# Patient Record
Sex: MALE | Race: WHITE | Employment: OTHER | URBAN - METROPOLITAN AREA
[De-identification: names, ages, dates, MRNs, and addresses within clinical notes are randomized per-mention and may not be internally consistent; named-entity substitution may affect disease eponyms.]

---

## 2019-11-19 ENCOUNTER — OFFICE VISIT (OUTPATIENT)
Dept: PODIATRY | Age: 79
End: 2019-11-19
Payer: COMMERCIAL

## 2019-11-19 VITALS
WEIGHT: 178 LBS | TEMPERATURE: 97.8 F | SYSTOLIC BLOOD PRESSURE: 136 MMHG | BODY MASS INDEX: 26.29 KG/M2 | DIASTOLIC BLOOD PRESSURE: 78 MMHG

## 2019-11-19 DIAGNOSIS — I73.9 PERIPHERAL VASCULAR DISEASE, UNSPECIFIED (HCC): ICD-10-CM

## 2019-11-19 DIAGNOSIS — R26.2 DIFFICULTY WALKING: ICD-10-CM

## 2019-11-19 DIAGNOSIS — M79.674 PAIN IN TOE OF RIGHT FOOT: ICD-10-CM

## 2019-11-19 DIAGNOSIS — B35.1 TINEA UNGUIUM: Primary | ICD-10-CM

## 2019-11-19 DIAGNOSIS — L84 CORN OR CALLUS: ICD-10-CM

## 2019-11-19 DIAGNOSIS — M20.42 HAMMER TOES OF BOTH FEET: ICD-10-CM

## 2019-11-19 DIAGNOSIS — M20.41 HAMMER TOES OF BOTH FEET: ICD-10-CM

## 2019-11-19 DIAGNOSIS — M79.675 PAIN IN LEFT TOE(S): ICD-10-CM

## 2019-11-19 PROCEDURE — 99203 OFFICE O/P NEW LOW 30 MIN: CPT | Performed by: PODIATRIST

## 2019-12-12 ENCOUNTER — HOSPITAL ENCOUNTER (EMERGENCY)
Age: 79
Discharge: HOME | End: 2019-12-13
Attending: EMERGENCY MEDICINE
Payer: COMMERCIAL

## 2019-12-12 ENCOUNTER — APPOINTMENT (OUTPATIENT)
Dept: GENERAL RADIOLOGY | Age: 79
End: 2019-12-12
Payer: COMMERCIAL

## 2019-12-12 DIAGNOSIS — J11.1 INFLUENZA-LIKE ILLNESS: Primary | ICD-10-CM

## 2019-12-12 PROCEDURE — 87502 INFLUENZA DNA AMP PROBE: CPT

## 2019-12-12 PROCEDURE — 81001 URINALYSIS AUTO W/SCOPE: CPT

## 2019-12-12 PROCEDURE — 71045 X-RAY EXAM CHEST 1 VIEW: CPT

## 2019-12-12 PROCEDURE — 99284 EMERGENCY DEPT VISIT MOD MDM: CPT

## 2019-12-12 RX ORDER — ACETAMINOPHEN 500 MG
1000 TABLET ORAL ONCE
Status: COMPLETED | OUTPATIENT
Start: 2019-12-13 | End: 2019-12-13

## 2019-12-12 RX ORDER — KETOROLAC TROMETHAMINE 30 MG/ML
30 INJECTION, SOLUTION INTRAMUSCULAR; INTRAVENOUS ONCE
Status: COMPLETED | OUTPATIENT
Start: 2019-12-13 | End: 2019-12-13

## 2019-12-12 ASSESSMENT — PAIN DESCRIPTION - DESCRIPTORS: DESCRIPTORS: ACHING

## 2019-12-12 ASSESSMENT — PAIN SCALES - GENERAL: PAINLEVEL_OUTOF10: 3

## 2019-12-12 ASSESSMENT — PAIN DESCRIPTION - FREQUENCY: FREQUENCY: CONTINUOUS

## 2019-12-12 ASSESSMENT — PAIN DESCRIPTION - LOCATION: LOCATION: GENERALIZED

## 2019-12-12 ASSESSMENT — PAIN DESCRIPTION - PAIN TYPE: TYPE: ACUTE PAIN

## 2019-12-13 VITALS
WEIGHT: 167 LBS | HEART RATE: 103 BPM | HEIGHT: 70 IN | SYSTOLIC BLOOD PRESSURE: 179 MMHG | BODY MASS INDEX: 23.91 KG/M2 | OXYGEN SATURATION: 94 % | DIASTOLIC BLOOD PRESSURE: 88 MMHG | RESPIRATION RATE: 16 BRPM | TEMPERATURE: 100.1 F

## 2019-12-13 LAB
BACTERIA: ABNORMAL /HPF
BILIRUBIN URINE: NEGATIVE
BLOOD, URINE: ABNORMAL
CLARITY: CLEAR
COLOR: YELLOW
EPITHELIAL CELLS, UA: ABNORMAL /HPF
GLUCOSE URINE: NEGATIVE MG/DL
INFLUENZA A BY PCR: NOT DETECTED
INFLUENZA B BY PCR: NOT DETECTED
KETONES, URINE: NEGATIVE MG/DL
LEUKOCYTE ESTERASE, URINE: NEGATIVE
NITRITE, URINE: POSITIVE
PH UA: 6.5 (ref 5–9)
PROTEIN UA: 30 MG/DL
RBC UA: ABNORMAL /HPF (ref 0–2)
SPECIFIC GRAVITY UA: 1.02 (ref 1–1.03)
UROBILINOGEN, URINE: 0.2 E.U./DL
WBC UA: ABNORMAL /HPF (ref 0–5)

## 2019-12-13 PROCEDURE — 6360000002 HC RX W HCPCS: Performed by: EMERGENCY MEDICINE

## 2019-12-13 PROCEDURE — 87040 BLOOD CULTURE FOR BACTERIA: CPT

## 2019-12-13 PROCEDURE — 36415 COLL VENOUS BLD VENIPUNCTURE: CPT

## 2019-12-13 PROCEDURE — 96372 THER/PROPH/DIAG INJ SC/IM: CPT

## 2019-12-13 PROCEDURE — 6370000000 HC RX 637 (ALT 250 FOR IP): Performed by: EMERGENCY MEDICINE

## 2019-12-13 RX ADMIN — KETOROLAC TROMETHAMINE 30 MG: 30 INJECTION, SOLUTION INTRAMUSCULAR; INTRAVENOUS at 00:09

## 2019-12-13 RX ADMIN — ACETAMINOPHEN 1000 MG: 500 TABLET ORAL at 00:09

## 2019-12-13 ASSESSMENT — PAIN SCALES - GENERAL: PAINLEVEL_OUTOF10: 6

## 2019-12-18 LAB
BLOOD CULTURE, ROUTINE: NORMAL
CULTURE, BLOOD 2: NORMAL

## 2022-02-07 ENCOUNTER — TELEPHONE (OUTPATIENT)
Dept: ADMINISTRATIVE | Age: 82
End: 2022-02-07

## 2022-02-07 NOTE — TELEPHONE ENCOUNTER
Patient would like to reschedule his appt he missed on 02/03 from the weather, He stated this is for Bilateral heel spurs. Skin getting real hard. Please contact patient.

## 2022-02-08 ENCOUNTER — PROCEDURE VISIT (OUTPATIENT)
Dept: PODIATRY | Age: 82
End: 2022-02-08
Payer: COMMERCIAL

## 2022-02-08 VITALS
TEMPERATURE: 98.2 F | BODY MASS INDEX: 25.97 KG/M2 | DIASTOLIC BLOOD PRESSURE: 62 MMHG | WEIGHT: 181 LBS | SYSTOLIC BLOOD PRESSURE: 133 MMHG

## 2022-02-08 DIAGNOSIS — L84 CORN OR CALLUS: ICD-10-CM

## 2022-02-08 DIAGNOSIS — B35.1 TINEA UNGUIUM: Primary | ICD-10-CM

## 2022-02-08 DIAGNOSIS — M79.675 PAIN IN LEFT TOE(S): ICD-10-CM

## 2022-02-08 DIAGNOSIS — M79.674 PAIN IN TOE OF RIGHT FOOT: ICD-10-CM

## 2022-02-08 DIAGNOSIS — I73.9 PERIPHERAL VASCULAR DISEASE, UNSPECIFIED (HCC): ICD-10-CM

## 2022-02-08 PROCEDURE — 11721 DEBRIDE NAIL 6 OR MORE: CPT | Performed by: PODIATRIST

## 2022-02-08 RX ORDER — AMMONIUM LACTATE 12 G/100G
CREAM TOPICAL
Qty: 385 G | Refills: 2 | Status: SHIPPED | OUTPATIENT
Start: 2022-02-08

## 2022-02-08 NOTE — PROGRESS NOTES
[x] [x] [x] [x] [x] [x] [x] [x] [x] [x]  5 4 3  2 1 1 2 3 4 5                         Right                                        Left      Nails that are described above are all elongated thickened pitting mycotic yellowish incurvated causing pain with both shoe gear. Palpation nails greater then 3 mm thick painful       Dermatologic Exam:hair loss noted  lower extremity    Skin lesion/ulceration   Skin   Callus BILATERAL  FOREFOOT   Musculoskeletal:     1st MPJ ROM normal, Bilateral.  Muscle strength 5/5, Bilateral.  Pain present upon palpation of toenails 1-5  Bilateral., Bilateral.  Ankle ROM normal,Bilateral.    Dorsally contracted digits , Bilateral.     Vascular:  Pulses   bilateral DP PRESENT     PT ABSENT     Neurological:  Sensation present to light touch to level of digits, Bilateral.    Foot Exam     Ortho Exam  Q7   []Yes    []No                Q8   [x]Yes    []No                     Q9   []Yes    []No  Assessment:  80 y.o. male with:   1. Tinea unguium    2. Pain in left toe(s)    3. Pain in toe of right foot    4. Peripheral vascular disease, unspecified ([address]    5. Corn or callus     No orders of the defined types were placed in this encounter. Plan:   Pt was evaluated and examined. Patient was given personalized discharge instructions. Nails 1-10 were debrided in length and thickness sharply with a nail nipper and  without incident. Pt will follow up in 9 weeks or sooner if any problems arise. Diagnosis was discussed with the pt and all of their questions were answered in detail. Proper foot hygiene and care was discussed with the pt. Patient to check feet daily and contact the office with any questions/problems/concerns. Other comorbidity noted and will be managed by PCP. Pain waiver discussed with patient and confirmed.    2/8/2022      Electronically signed by Giulia Leslie DPM on 2/8/2022 at 2:36 PM  2/8/2022

## 2022-03-14 ENCOUNTER — HOSPITAL ENCOUNTER (OUTPATIENT)
Age: 82
Discharge: HOME | End: 2022-03-16
Payer: COMMERCIAL

## 2022-03-14 ENCOUNTER — HOSPITAL ENCOUNTER (OUTPATIENT)
Dept: GENERAL RADIOLOGY | Age: 82
Discharge: HOME | End: 2022-03-16
Payer: COMMERCIAL

## 2022-03-14 DIAGNOSIS — M79.632 LEFT FOREARM PAIN: ICD-10-CM

## 2022-03-14 PROCEDURE — 73090 X-RAY EXAM OF FOREARM: CPT

## 2022-04-12 ENCOUNTER — PROCEDURE VISIT (OUTPATIENT)
Dept: PODIATRY | Age: 82
End: 2022-04-12
Payer: COMMERCIAL

## 2022-04-12 VITALS
BODY MASS INDEX: 25.97 KG/M2 | SYSTOLIC BLOOD PRESSURE: 142 MMHG | DIASTOLIC BLOOD PRESSURE: 64 MMHG | TEMPERATURE: 98.2 F | WEIGHT: 181 LBS

## 2022-04-12 DIAGNOSIS — I73.9 PERIPHERAL VASCULAR DISEASE, UNSPECIFIED (HCC): ICD-10-CM

## 2022-04-12 DIAGNOSIS — M79.675 PAIN IN LEFT TOE(S): ICD-10-CM

## 2022-04-12 DIAGNOSIS — B35.1 TINEA UNGUIUM: Primary | ICD-10-CM

## 2022-04-12 DIAGNOSIS — M79.674 PAIN IN TOE OF RIGHT FOOT: ICD-10-CM

## 2022-04-12 PROCEDURE — 11721 DEBRIDE NAIL 6 OR MORE: CPT | Performed by: PODIATRIST

## 2022-04-12 NOTE — PROGRESS NOTES
Missy Landers  Return Patient    Chief Complaint   Patient presents with    Toe Pain     saw pcp Dr. Francis Nance on 2/14/22       Subjective: This Picherlilian Friend comes to office for foot and nail care. Pt currently has complaint of thickened, painful, elongated nails that he/she cannot manage by themselves. Pt. Relates pain to nails with shoe gear. Pt's primary care physician is Olam Gottron, MD.  Past Medical History:   Diagnosis Date    Hyperlipidemia     Hypertension        No Known Allergies  Current Outpatient Medications on File Prior to Visit   Medication Sig Dispense Refill    ammonium lactate (LAC-HYDRIN) 12 % cream Apply topically as needed. 385 g 2    aspirin 81 MG tablet Take 81 mg by mouth daily      Atorvastatin Calcium (LIPITOR PO) Take 20 mg by mouth       lisinopril (PRINIVIL;ZESTRIL) 20 MG tablet Take 20 mg by mouth daily       No current facility-administered medications on file prior to visit. Review of Systems  Objective:  General: AAO x 3 in NAD.     Derm  Toenail Description  Sites of Onychomycosis Involvement (Check affected area)  [x] [x] [x] [x] [x] [x] [x] [x] [x] [x]  5 4 3 2 1 1 2 3 4 5                          Right                                        Left    Thickness  [x] [x] [x] [x] [x] [x] [x] [x] [x] [x]  5 4 3 2 1 1 2 3 4 5                         Right                                        Left    Dystrophic Changes   [x] [x] [x] [x] [x] [x] [x] [x] [x] [x]  5 4 3 2 1 1 2 3 4 5                         Right                                        Left    Color  [x] [x] [x] [x] [x] [x] [x] [x] [x] [x]  5 4 3 2 1 1 2 3 4 5                          Right                                        Left    Incurvation/Ingrowin   [x] [x] [x] [x] [x] [x] [x] [x] [x] [x]  5 4 3 2 1 1 2 3 4 5                         Right                                        Left    Inflammation/Pain   [x] [x] [x] [x] [x] [x] [x] [x] [x] [x]  5 4 3  2 1 1 2 3 4 5 Right                                        Left      Nails that are described above are all elongated thickened pitting mycotic yellowish incurvated causing pain with both shoe gear. Palpation nails greater then 3 mm thick painful       Dermatologic Exam:hair loss noted  lower extremity    Skin lesion/ulceration   Skin   Callus BILATERAL  FOREFOOT   Musculoskeletal:     1st MPJ ROM normal, Bilateral.  Muscle strength 5/5, Bilateral.  Pain present upon palpation of toenails 1-5  Bilateral., Bilateral.  Ankle ROM normal,Bilateral.    Dorsally contracted digits , Bilateral.     Vascular:  Pulses   bilateral DP PRESENT     PT ABSENT     Neurological:  Sensation present to light touch to level of digits, Bilateral.    Foot Exam     Ortho Exam  Q7   []Yes    []No                Q8   [x]Yes    []No                     Q9   []Yes    []No  Assessment:  80 y.o. male with:   1. Tinea unguium    2. Pain in left toe(s)    3. Pain in toe of right foot    4. Peripheral vascular disease, unspecified ([facility] [address]     No orders of the defined types were placed in this encounter. Plan:   Pt was evaluated and examined. Patient was given personalized discharge instructions. Nails 1-10 were debrided in length and thickness sharply with a nail nipper and  without incident. Pt will follow up in 9 weeks or sooner if any problems arise. Diagnosis was discussed with the pt and all of their questions were answered in detail. Proper foot hygiene and care was discussed with the pt. Patient to check feet daily and contact the office with any questions/problems/concerns. Other comorbidity noted and will be managed by PCP. Pain waiver discussed with patient and confirmed.    4/12/2022      Electronically signed by Akbar Panchal DPM on 4/12/2022 at 2:24 PM  4/12/2022

## 2022-09-28 LAB — VITAMIN B-12: 754

## 2023-01-04 ENCOUNTER — ANESTHESIA EVENT (OUTPATIENT)
Dept: CT IMAGING | Age: 83
End: 2023-01-04

## 2023-01-04 ENCOUNTER — ANESTHESIA (OUTPATIENT)
Dept: CT IMAGING | Age: 83
End: 2023-01-04

## 2023-01-04 ENCOUNTER — HOSPITAL ENCOUNTER (OUTPATIENT)
Dept: CT IMAGING | Age: 83
Discharge: HOME | End: 2023-01-06
Payer: COMMERCIAL

## 2023-01-04 VITALS
TEMPERATURE: 98.6 F | HEART RATE: 77 BPM | OXYGEN SATURATION: 98 % | SYSTOLIC BLOOD PRESSURE: 159 MMHG | RESPIRATION RATE: 17 BRPM | DIASTOLIC BLOOD PRESSURE: 69 MMHG

## 2023-01-04 DIAGNOSIS — D69.6 THROMBOCYTOPENIA, UNSPECIFIED (HCC): ICD-10-CM

## 2023-01-04 LAB
ANION GAP SERPL CALCULATED.3IONS-SCNC: 12 MMOL/L (ref 7–16)
ANISOCYTOSIS: ABNORMAL
BASOPHILS ABSOLUTE: 0.04 E9/L (ref 0–0.2)
BASOPHILS RELATIVE PERCENT: 0.9 % (ref 0–2)
BUN BLDV-MCNC: 31 MG/DL (ref 6–23)
CALCIUM SERPL-MCNC: 9.7 MG/DL (ref 8.6–10.2)
CHLORIDE BLD-SCNC: 106 MMOL/L (ref 98–107)
CO2: 23 MMOL/L (ref 22–29)
CREAT SERPL-MCNC: 1.2 MG/DL (ref 0.7–1.2)
EOSINOPHILS ABSOLUTE: 0 E9/L (ref 0.05–0.5)
EOSINOPHILS RELATIVE PERCENT: 0.4 % (ref 0–6)
GFR SERPL CREATININE-BSD FRML MDRD: >60 ML/MIN/1.73
GLUCOSE BLD-MCNC: 82 MG/DL (ref 74–99)
HCT VFR BLD CALC: 26 % (ref 37–54)
HEMOGLOBIN: 8.3 G/DL (ref 12.5–16.5)
INR BLD: 1.2
LYMPHOCYTES ABSOLUTE: 1.78 E9/L (ref 1.5–4)
LYMPHOCYTES RELATIVE PERCENT: 36.5 % (ref 20–42)
MCH RBC QN AUTO: 28.5 PG (ref 26–35)
MCHC RBC AUTO-ENTMCNC: 31.9 % (ref 32–34.5)
MCV RBC AUTO: 89.3 FL (ref 80–99.9)
METAMYELOCYTES RELATIVE PERCENT: 1.7 % (ref 0–1)
MONOCYTES ABSOLUTE: 0.19 E9/L (ref 0.1–0.95)
MONOCYTES RELATIVE PERCENT: 3.5 % (ref 2–12)
MYELOCYTE PERCENT: 1.7 % (ref 0–0)
NEUTROPHILS ABSOLUTE: 2.83 E9/L (ref 1.8–7.3)
NEUTROPHILS RELATIVE PERCENT: 55.7 % (ref 43–80)
NUCLEATED RED BLOOD CELLS: 5.2 /100 WBC
OVALOCYTES: ABNORMAL
PDW BLD-RTO: 17 FL (ref 11.5–15)
PLATELET # BLD: 57 E9/L (ref 130–450)
PLATELET CONFIRMATION: NORMAL
PMV BLD AUTO: 11.2 FL (ref 7–12)
POIKILOCYTES: ABNORMAL
POLYCHROMASIA: ABNORMAL
POTASSIUM SERPL-SCNC: 4.8 MMOL/L (ref 3.5–5)
PROTHROMBIN TIME: 13.6 SEC (ref 9.3–12.4)
RBC # BLD: 2.91 E12/L (ref 3.8–5.8)
SODIUM BLD-SCNC: 141 MMOL/L (ref 132–146)
TEAR DROP CELLS: ABNORMAL
WBC # BLD: 4.8 E9/L (ref 4.5–11.5)

## 2023-01-04 PROCEDURE — 77012 CT SCAN FOR NEEDLE BIOPSY: CPT

## 2023-01-04 PROCEDURE — 2580000003 HC RX 258: Performed by: NURSE ANESTHETIST, CERTIFIED REGISTERED

## 2023-01-04 PROCEDURE — 80048 BASIC METABOLIC PNL TOTAL CA: CPT

## 2023-01-04 PROCEDURE — 3700000001 HC ADD 15 MINUTES (ANESTHESIA)

## 2023-01-04 PROCEDURE — 36415 COLL VENOUS BLD VENIPUNCTURE: CPT

## 2023-01-04 PROCEDURE — 7100000010 HC PHASE II RECOVERY - FIRST 15 MIN

## 2023-01-04 PROCEDURE — 7100000011 HC PHASE II RECOVERY - ADDTL 15 MIN

## 2023-01-04 PROCEDURE — 3700000000 HC ANESTHESIA ATTENDED CARE

## 2023-01-04 PROCEDURE — 6360000002 HC RX W HCPCS: Performed by: NURSE ANESTHETIST, CERTIFIED REGISTERED

## 2023-01-04 PROCEDURE — 38222 DX BONE MARROW BX & ASPIR: CPT

## 2023-01-04 PROCEDURE — 85025 COMPLETE CBC W/AUTO DIFF WBC: CPT

## 2023-01-04 PROCEDURE — 85610 PROTHROMBIN TIME: CPT

## 2023-01-04 RX ORDER — LISINOPRIL 10 MG/1
10 TABLET ORAL DAILY
COMMUNITY
Start: 2022-12-11

## 2023-01-04 RX ORDER — PROPOFOL 10 MG/ML
INJECTION, EMULSION INTRAVENOUS PRN
Status: DISCONTINUED | OUTPATIENT
Start: 2023-01-04 | End: 2023-01-04 | Stop reason: SDUPTHER

## 2023-01-04 RX ORDER — UBIDECARENONE 200 MG
200 CAPSULE ORAL DAILY
COMMUNITY

## 2023-01-04 RX ORDER — MULTIVITAMIN WITH IRON
250 TABLET ORAL DAILY
COMMUNITY

## 2023-01-04 RX ORDER — ATORVASTATIN CALCIUM 20 MG/1
20 TABLET, FILM COATED ORAL DAILY
COMMUNITY
Start: 2022-12-11

## 2023-01-04 RX ORDER — ICOSAPENT ETHYL 1000 MG/1
2 CAPSULE ORAL
COMMUNITY
Start: 2022-12-13

## 2023-01-04 RX ORDER — SODIUM CHLORIDE 9 MG/ML
INJECTION, SOLUTION INTRAVENOUS CONTINUOUS PRN
Status: DISCONTINUED | OUTPATIENT
Start: 2023-01-04 | End: 2023-01-04 | Stop reason: SDUPTHER

## 2023-01-04 RX ADMIN — PROPOFOL 100 MG: 10 INJECTION, EMULSION INTRAVENOUS at 12:44

## 2023-01-04 RX ADMIN — PROPOFOL 30 MG: 10 INJECTION, EMULSION INTRAVENOUS at 12:53

## 2023-01-04 RX ADMIN — PROPOFOL 75 MCG/KG/MIN: 10 INJECTION, EMULSION INTRAVENOUS at 12:45

## 2023-01-04 RX ADMIN — SODIUM CHLORIDE: 9 INJECTION, SOLUTION INTRAVENOUS at 12:32

## 2023-01-04 ASSESSMENT — LIFESTYLE VARIABLES: SMOKING_STATUS: 1

## 2023-01-04 NOTE — PROCEDURES
Patient arrived Radiology department for bone marrow biopsy and aspiration. Allergies, home medications, H&P and fasting instructions reviewed with patient. Vital signs taken. R FA 20g IV placed, blood obtained, IV flushed and prn adapter attached. Blood sample sent to lab for ordered tests. Procedural instructions given, questions answered, understanding expressed and consent signed. Patient given fluoroscopy education, no questions at this time.

## 2023-01-04 NOTE — PROCEDURES
1330 Patient returned from procedure. Dressing checked, clean, dry, and intact. Patient stable. No s/s of complications noted or reported. Vitals will be checked q 15min, see flow sheets. 1345 Dressing checked, clean, dry, and intact. Patient stable. No s/s of complications noted or reported. Patient eating and drinking well with no s/s of complications noted or reported. Patient discharged, site was checked with every set of vitals. Site clean dry and intact. Discharge papers reviewed with patient, questions answered, discharge paper signed. Patient taken to door via ***. Patient in stable condition, no s/s of complications noted or reported.

## 2023-01-04 NOTE — ANESTHESIA PRE PROCEDURE
Department of Anesthesiology  Preprocedure Note       Name:  Laura Landeros   Age:  [de-identified] y.o.  :  1940                                          MRN:  [id number]         Date:  2023      Surgeon: * No surgeons listed *    Procedure: * No procedures listed *    Medications prior to admission:   Prior to Admission medications    Medication Sig Start Date End Date Taking? Authorizing Provider   atorvastatin (LIPITOR) 20 MG tablet Take 20 mg by mouth daily 22   Historical Provider, MD   lisinopril (PRINIVIL;ZESTRIL) 10 MG tablet Take 10 mg by mouth daily 22   Historical Provider, MD   Icosapent Ethyl (VASCEPA) 1 g CAPS capsule Take 2 capsules by mouth 22   Historical Provider, MD   Cholecalciferol 50 MCG ( UT) CAPS Take 2,000 Units by mouth daily    Historical Provider, MD   magnesium (MAGNESIUM-OXIDE) 250 MG TABS tablet Take 250 mg by mouth daily    Historical Provider, MD   coenzyme Q10 200 MG CAPS capsule Take 200 mg by mouth daily    Historical Provider, MD   ammonium lactate (LAC-HYDRIN) 12 % cream Apply topically as needed. 22   Dylon Kent DPM   aspirin 81 MG tablet Take 81 mg by mouth daily    Historical Provider, MD       Current medications:    Current Outpatient Medications   Medication Sig Dispense Refill    atorvastatin (LIPITOR) 20 MG tablet Take 20 mg by mouth daily      lisinopril (PRINIVIL;ZESTRIL) 10 MG tablet Take 10 mg by mouth daily      Icosapent Ethyl (VASCEPA) 1 g CAPS capsule Take 2 capsules by mouth      Cholecalciferol 50 MCG ( UT) CAPS Take 2,000 Units by mouth daily      magnesium (MAGNESIUM-OXIDE) 250 MG TABS tablet Take 250 mg by mouth daily      coenzyme Q10 200 MG CAPS capsule Take 200 mg by mouth daily      ammonium lactate (LAC-HYDRIN) 12 % cream Apply topically as needed. 385 g 2    aspirin 81 MG tablet Take 81 mg by mouth daily       No current facility-administered medications for this encounter.        Allergies:  No Known Allergies    Problem List:  There is no problem list on file for this patient. Past Medical History:        Diagnosis Date    Hyperlipidemia     Hypertension        Past Surgical History:        Procedure Laterality Date    VASCULAR SURGERY         Social History:    Social History     Tobacco Use    Smoking status: Never    Smokeless tobacco: Never   Substance Use Topics    Alcohol use: No                                Counseling given: Not Answered      Vital Signs (Current):   Vitals:    01/04/23 1115   BP: (!) 162/68   Pulse: 78   Resp: 14   Temp: 37 °C (98.6 °F)   TempSrc: Tympanic   SpO2: 100%                                              BP Readings from Last 3 Encounters:   01/04/23 (!) 162/68   04/12/22 (!) 142/64   02/08/22 133/62       NPO Status:                                                                                 BMI:   Wt Readings from Last 3 Encounters:   04/12/22 181 lb (82.1 kg)   02/08/22 181 lb (82.1 kg)   12/12/19 167 lb (75.8 kg)     There is no height or weight on file to calculate BMI.    CBC:   Lab Results   Component Value Date/Time    WBC 4.8 01/04/2023 11:10 AM    RBC 2.91 01/04/2023 11:10 AM    HGB 8.3 01/04/2023 11:10 AM    HCT 26.0 01/04/2023 11:10 AM    MCV 89.3 01/04/2023 11:10 AM    RDW 17.0 01/04/2023 11:10 AM    PLT 57 01/04/2023 11:10 AM       CMP: No results found for: NA, K, CL, CO2, BUN, CREATININE, GFRAA, AGRATIO, LABGLOM, GLUCOSE, GLU, PROT, CALCIUM, BILITOT, ALKPHOS, AST, ALT    POC Tests: No results for input(s): POCGLU, POCNA, POCK, POCCL, POCBUN, POCHEMO, POCHCT in the last 72 hours.     Coags:   Lab Results   Component Value Date/Time    PROTIME 13.6 01/04/2023 11:10 AM    INR 1.2 01/04/2023 11:10 AM       HCG (If Applicable): No results found for: PREGTESTUR, PREGSERUM, HCG, HCGQUANT     ABGs: No results found for: PHART, PO2ART, MLL4JKH, ZBB9TLA, BEART, Y5HYJRGK     Type & Screen (If Applicable):  No results found for: LABABO, [address]    Drug/Infectious Status (If Applicable):  No results found for: HIV, HEPCAB    COVID-19 Screening (If Applicable): No results found for: COVID19        Anesthesia Evaluation  Nursing notes reviewed no history of anesthetic complications:   Airway: Mallampati: II  TM distance: <3 FB   Neck ROM: full  Mouth opening: > = 3 FB   Dental:          Pulmonary:Negative Pulmonary ROS breath sounds clear to auscultation  (+) current smoker (ex)          Patient did not smoke on day of surgery. Cardiovascular:  Exercise tolerance: good (>4 METS),   (+) hypertension: moderate, hyperlipidemia        Rhythm: regular  Rate: normal                    Neuro/Psych:               GI/Hepatic/Renal: Neg GI/Hepatic/Renal ROS            Endo/Other:    (+) malignancy/cancer. Pt had no PAT visit       Abdominal:             Vascular:   + PVD, aortic or cerebral, . Other Findings:           Anesthesia Plan      MAC     ASA 3       Induction: intravenous. Anesthetic plan and risks discussed with patient.                         Yesenia Morrissey, APRN - CRNA   1/4/2023

## 2023-01-04 NOTE — ANESTHESIA POSTPROCEDURE EVALUATION
Department of Anesthesiology  Postprocedure Note    Patient: Mau Deshpande  MRN: [id number]  YOB: 1940  Date of evaluation: 1/4/2023      Procedure Summary     Date: 01/04/23 Room / Location: [facility] Scan; [address] [place] [address] Procedures; CSF - [facility] Rad[facility]    Anesthesia Start: 1240 Anesthesia Stop: 0856    Procedures:       CT GUIDED NEEDLE PLACEMENT      CT BONE MARROW BIOPSY AND ASPIRATION Diagnosis: Thrombocytopenia, unspecified (HCC)      (BMB)      (BMB)    Scheduled Providers: [facility] General Radiologist Responsible Provider: Alayna Frausto MD    Anesthesia Type: MAC ASA Status: 3          Anesthesia Type: No value filed.     Julio Phase I:      Julio Phase II:        Anesthesia Post Evaluation    Patient location during evaluation: PACU  Patient participation: complete - patient participated  Level of consciousness: awake and alert  Pain score: 0  Airway patency: patent  Nausea & Vomiting: no nausea  Complications: no  Cardiovascular status: blood pressure returned to baseline  Respiratory status: acceptable  Hydration status: euvolemic

## 2023-03-01 ENCOUNTER — PROCEDURE VISIT (OUTPATIENT)
Dept: PODIATRY | Age: 83
End: 2023-03-01
Payer: COMMERCIAL

## 2023-03-01 VITALS
SYSTOLIC BLOOD PRESSURE: 141 MMHG | WEIGHT: 181 LBS | DIASTOLIC BLOOD PRESSURE: 83 MMHG | TEMPERATURE: 98.3 F | BODY MASS INDEX: 25.97 KG/M2

## 2023-03-01 DIAGNOSIS — M79.674 PAIN IN TOE OF RIGHT FOOT: ICD-10-CM

## 2023-03-01 DIAGNOSIS — I73.9 PERIPHERAL VASCULAR DISEASE, UNSPECIFIED (HCC): ICD-10-CM

## 2023-03-01 DIAGNOSIS — M79.675 PAIN IN LEFT TOE(S): ICD-10-CM

## 2023-03-01 DIAGNOSIS — B35.1 TINEA UNGUIUM: Primary | ICD-10-CM

## 2023-03-01 PROCEDURE — 11721 DEBRIDE NAIL 6 OR MORE: CPT | Performed by: PODIATRIST

## 2023-03-01 RX ORDER — PACRITINIB 100 MG/1
CAPSULE ORAL
COMMUNITY
Start: 2023-01-30

## 2023-03-01 NOTE — PROGRESS NOTES
Ju[facility]  Return Patient    Chief Complaint   Patient presents with    Toe Pain     Saw pcp Dr. Sweetie Nassar 1/20/23    Callouses       Subjective: This Davi Mendze comes to office for foot and nail care. Pt currently has complaint of thickened, painful, elongated nails that he/she cannot manage by themselves. Pt. Relates pain to nails with shoe gear. Pt's primary care physician is Rigoberto Quiroz MD.  Past Medical History:   Diagnosis Date    Hyperlipidemia     Hypertension        No Known Allergies  Current Outpatient Medications on File Prior to Visit   Medication Sig Dispense Refill    VONJO 100 MG CAPS       atorvastatin (LIPITOR) 20 MG tablet Take 20 mg by mouth daily      lisinopril (PRINIVIL;ZESTRIL) 10 MG tablet Take 10 mg by mouth daily      Icosapent Ethyl (VASCEPA) 1 g CAPS capsule Take 2 capsules by mouth      Cholecalciferol 50 MCG (2000 UT) CAPS Take 2,000 Units by mouth daily      magnesium (MAGNESIUM-OXIDE) 250 MG TABS tablet Take 250 mg by mouth daily      coenzyme Q10 200 MG CAPS capsule Take 200 mg by mouth daily      ammonium lactate (LAC-HYDRIN) 12 % cream Apply topically as needed. 385 g 2    aspirin 81 MG tablet Take 81 mg by mouth daily       No current facility-administered medications on file prior to visit. Review of Systems  Objective:  General: AAO x 3 in NAD.     Derm  Toenail Description  Sites of Onychomycosis Involvement (Check affected area)  [x] [x] [x] [x] [x] [x] [x] [x] [x] [x]  5 4 3 2 1 1 2 3 4 5                          Right                                        Left    Thickness  [x] [x] [x] [x] [x] [x] [x] [x] [x] [x]  5 4 3 2 1 1 2 3 4 5                         Right                                        Left    Dystrophic Changes   [x] [x] [x] [x] [x] [x] [x] [x] [x] [x]  5 4 3 2 1 1 2 3 4 5                         Right                                        Left    Color  [x] [x] [x] [x] [x] [x] [x] [x] [x] [x]  5 4 3 2 1 1 2 3 4 5 Right                                        Left    Incurvation/Ingrowin   [x] [x] [x] [x] [x] [x] [x] [x] [x] [x]  5 4 3 2 1 1 2 3 4 5                         Right                                        Left    Inflammation/Pain   [x] [x] [x] [x] [x] [x] [x] [x] [x] [x]  5 4 3  2 1 1 2 3 4 5                         Right                                        Left      Nails that are described above are all elongated thickened pitting mycotic yellowish incurvated causing pain with both shoe gear. Palpation nails greater then 3 mm thick painful       Dermatologic Exam:hair loss noted  lower extremity    Skin lesion/ulceration   Skin   Callus BILATERAL  FOREFOOT   Musculoskeletal:     1st MPJ ROM normal, Bilateral.  Muscle strength 5/5, Bilateral.  Pain present upon palpation of toenails 1-5  Bilateral., Bilateral.  Ankle ROM normal,Bilateral.    Dorsally contracted digits , Bilateral.     Vascular:  Pulses   bilateral DP PRESENT     PT ABSENT     Neurological:  Sensation present to light touch to level of digits, Bilateral.    Foot Exam     Ortho Exam  Q7   []Yes    []No                Q8   [x]Yes    []No                     Q9   []Yes    []No  Assessment:  [de-identified] y.o. male with:   1. Tinea unguium    2. Pain in left toe(s)    3. Peripheral vascular disease, unspecified ([address]    4. Pain in toe of right foot     No orders of the defined types were placed in this encounter. Plan:   Pt was evaluated and examined. Patient was given personalized discharge instructions. Nails 1-10 were debrided in length and thickness sharply with a nail nipper and  without incident. Pt will follow up in 9 weeks or sooner if any problems arise. Diagnosis was discussed with the pt and all of their questions were answered in detail. Proper foot hygiene and care was discussed with the pt. Patient to check feet daily and contact the office with any questions/problems/concerns.   Other comorbidity noted and will be managed by PCP. Pain waiver discussed with patient and confirmed.    3/1/2023      Electronically signed by Julito Vinson DPM on 3/1/2023 at 4:24 PM  3/1/2023

## 2023-03-06 ENCOUNTER — TELEPHONE (OUTPATIENT)
Dept: PODIATRY | Age: 83
End: 2023-03-06

## 2023-03-06 RX ORDER — AMMONIUM LACTATE 12 G/100G
CREAM TOPICAL
Qty: 385 G | Refills: 2 | Status: SHIPPED | OUTPATIENT
Start: 2023-03-06

## 2023-04-18 ENCOUNTER — OFFICE VISIT (OUTPATIENT)
Dept: PODIATRY | Age: 83
End: 2023-04-18
Payer: COMMERCIAL

## 2023-04-18 VITALS
SYSTOLIC BLOOD PRESSURE: 152 MMHG | WEIGHT: 181 LBS | DIASTOLIC BLOOD PRESSURE: 72 MMHG | HEIGHT: 70 IN | TEMPERATURE: 97.2 F | BODY MASS INDEX: 25.91 KG/M2

## 2023-04-18 DIAGNOSIS — M79.672 LEFT FOOT PAIN: Primary | ICD-10-CM

## 2023-04-18 DIAGNOSIS — L84 CORN OR CALLUS: ICD-10-CM

## 2023-04-18 DIAGNOSIS — M79.675 PAIN IN LEFT TOE(S): ICD-10-CM

## 2023-04-18 DIAGNOSIS — M77.42 METATARSALGIA OF LEFT FOOT: ICD-10-CM

## 2023-04-18 PROCEDURE — 20600 DRAIN/INJ JOINT/BURSA W/O US: CPT | Performed by: PODIATRIST

## 2023-04-18 PROCEDURE — 99213 OFFICE O/P EST LOW 20 MIN: CPT | Performed by: PODIATRIST

## 2023-04-18 PROCEDURE — 1123F ACP DISCUSS/DSCN MKR DOCD: CPT | Performed by: PODIATRIST

## 2023-04-18 RX ORDER — METHYLPREDNISOLONE ACETATE 40 MG/ML
40 INJECTION, SUSPENSION INTRA-ARTICULAR; INTRALESIONAL; INTRAMUSCULAR; SOFT TISSUE ONCE
Status: COMPLETED | OUTPATIENT
Start: 2023-04-18 | End: 2023-04-18

## 2023-04-18 RX ADMIN — METHYLPREDNISOLONE ACETATE 40 MG: 40 INJECTION, SUSPENSION INTRA-ARTICULAR; INTRALESIONAL; INTRAMUSCULAR; SOFT TISSUE at 15:47

## 2023-04-18 NOTE — PROGRESS NOTES
23  Ruben Davis : 1940 Sex: male  Age: [de-identified] y.o. Patient was referred by Abbe Rivera MD    Chief Complaint   Patient presents with    Foot Pain    Callouses     Pt has callous pain in his left foot wanted an xray today  Saw pcp Dr. Matt Jarrett 2023  Pt has a reg appointment 23       SUBJECTIVE patient is seen today for a painful left foot. No trauma noted pain on the bottom of the left foot. Foot Pain     Review of Systems  Const: Denies constitutional symptoms  Musculo: Denies symptoms other than stated above  Skin: Denies symptoms other than stated above       Current Outpatient Medications:     ammonium lactate (LAC-HYDRIN) 12 % cream, Apply topically as needed. , Disp: 385 g, Rfl: 2    VONJO 100 MG CAPS, , Disp: , Rfl:     atorvastatin (LIPITOR) 20 MG tablet, Take 1 tablet by mouth daily, Disp: , Rfl:     lisinopril (PRINIVIL;ZESTRIL) 10 MG tablet, Take 1 tablet by mouth daily, Disp: , Rfl:     Icosapent Ethyl (VASCEPA) 1 g CAPS capsule, Take 2 capsules by mouth, Disp: , Rfl:     Cholecalciferol 50 MCG (2000 UT) CAPS, Take 2,000 Units by mouth daily, Disp: , Rfl:     magnesium (MAGNESIUM-OXIDE) 250 MG TABS tablet, Take 1 tablet by mouth daily, Disp: , Rfl:     coenzyme Q10 200 MG CAPS capsule, Take 1 capsule by mouth daily, Disp: , Rfl:     aspirin 81 MG tablet, Take 1 tablet by mouth daily, Disp: , Rfl:   No Known Allergies    Past Medical History:   Diagnosis Date    Hyperlipidemia     Hypertension      Past Surgical History:   Procedure Laterality Date    VASCULAR SURGERY       No family history on file.   Social History     Socioeconomic History    Marital status:      Spouse name: Not on file    Number of children: Not on file    Years of education: Not on file    Highest education level: Not on file   Occupational History    Not on file   Tobacco Use    Smoking status: Never    Smokeless tobacco: Never   Substance and Sexual Activity    Alcohol use: No    Drug

## 2023-05-31 ENCOUNTER — PROCEDURE VISIT (OUTPATIENT)
Dept: PODIATRY | Age: 83
End: 2023-05-31
Payer: COMMERCIAL

## 2023-05-31 VITALS
BODY MASS INDEX: 25.97 KG/M2 | SYSTOLIC BLOOD PRESSURE: 138 MMHG | TEMPERATURE: 97.6 F | WEIGHT: 181 LBS | DIASTOLIC BLOOD PRESSURE: 64 MMHG

## 2023-05-31 DIAGNOSIS — M79.674 PAIN IN TOE OF RIGHT FOOT: ICD-10-CM

## 2023-05-31 DIAGNOSIS — M79.675 PAIN IN LEFT TOE(S): ICD-10-CM

## 2023-05-31 DIAGNOSIS — I73.9 PERIPHERAL VASCULAR DISEASE, UNSPECIFIED (HCC): ICD-10-CM

## 2023-05-31 DIAGNOSIS — B35.1 TINEA UNGUIUM: Primary | ICD-10-CM

## 2023-05-31 PROCEDURE — 11721 DEBRIDE NAIL 6 OR MORE: CPT | Performed by: PODIATRIST

## 2023-06-19 ENCOUNTER — HOSPITAL ENCOUNTER (OUTPATIENT)
Age: 83
Setting detail: OBSERVATION
Discharge: HOME | End: 2023-06-24
Attending: EMERGENCY MEDICINE | Admitting: INTERNAL MEDICINE
Payer: COMMERCIAL

## 2023-06-19 ENCOUNTER — APPOINTMENT (OUTPATIENT)
Dept: CT IMAGING | Age: 83
End: 2023-06-19
Payer: COMMERCIAL

## 2023-06-19 DIAGNOSIS — R33.9 URINARY RETENTION: Primary | ICD-10-CM

## 2023-06-19 DIAGNOSIS — D49.4 BLADDER TUMOR: ICD-10-CM

## 2023-06-19 DIAGNOSIS — D69.6 THROMBOCYTOPENIA (HCC): ICD-10-CM

## 2023-06-19 DIAGNOSIS — D64.9 CHRONIC ANEMIA: ICD-10-CM

## 2023-06-19 DIAGNOSIS — N30.00 ACUTE CYSTITIS WITHOUT HEMATURIA: ICD-10-CM

## 2023-06-19 LAB
ANION GAP SERPL CALCULATED.3IONS-SCNC: 11 MMOL/L (ref 7–16)
ANISOCYTOSIS: ABNORMAL
BACTERIA URNS QL MICRO: ABNORMAL /HPF
BASOPHILIC STIPPLING: ABNORMAL
BASOPHILS # BLD: 0 E9/L (ref 0–0.2)
BASOPHILS NFR BLD: 0 % (ref 0–2)
BILIRUB UR QL STRIP: NEGATIVE
BUN SERPL-MCNC: 28 MG/DL (ref 6–23)
CALCIUM SERPL-MCNC: 9 MG/DL (ref 8.6–10.2)
CHLORIDE SERPL-SCNC: 109 MMOL/L (ref 98–107)
CLARITY UR: ABNORMAL
CO2 SERPL-SCNC: 23 MMOL/L (ref 22–29)
COLOR UR: ABNORMAL
CREAT SERPL-MCNC: 1.2 MG/DL (ref 0.7–1.2)
EOSINOPHIL # BLD: 0.05 E9/L (ref 0.05–0.5)
EOSINOPHIL NFR BLD: 1 % (ref 0–6)
ERYTHROCYTE [DISTWIDTH] IN BLOOD BY AUTOMATED COUNT: 18.4 FL (ref 11.5–15)
GLUCOSE SERPL-MCNC: 99 MG/DL (ref 74–99)
GLUCOSE UR STRIP-MCNC: NEGATIVE MG/DL
HCT VFR BLD AUTO: 30.7 % (ref 37–54)
HGB BLD-MCNC: 9.9 G/DL (ref 12.5–16.5)
HGB UR QL STRIP: ABNORMAL
KETONES UR STRIP-MCNC: NEGATIVE MG/DL
LEUKOCYTE ESTERASE UR QL STRIP: ABNORMAL
LYMPHOCYTES # BLD: 2.91 E9/L (ref 1.5–4)
LYMPHOCYTES NFR BLD: 62 % (ref 20–42)
MCH RBC QN AUTO: 28.9 PG (ref 26–35)
MCHC RBC AUTO-ENTMCNC: 32.2 % (ref 32–34.5)
MCV RBC AUTO: 89.8 FL (ref 80–99.9)
METAMYELOCYTES NFR BLD MANUAL: 3 % (ref 0–1)
MONOCYTES # BLD: 0.09 E9/L (ref 0.1–0.95)
MONOCYTES NFR BLD: 2 % (ref 2–12)
NEUTROPHILS # BLD: 1.65 E9/L (ref 1.8–7.3)
NEUTS SEG NFR BLD: 32 % (ref 43–80)
NITRITE UR QL STRIP: POSITIVE
NRBC BLD-RTO: 26 /100 WBC
OVALOCYTES: ABNORMAL
PH UR STRIP: 6.5 [PH] (ref 5–9)
PLATELET # BLD AUTO: 35 E9/L (ref 130–450)
PLATELET CONFIRMATION: NORMAL
PMV BLD AUTO: ABNORMAL FL (ref 7–12)
POIKILOCYTES: ABNORMAL
POLYCHROMASIA: ABNORMAL
POTASSIUM SERPL-SCNC: 4.6 MMOL/L (ref 3.5–5)
PROT UR STRIP-MCNC: >=300 MG/DL
RBC # BLD AUTO: 3.42 E12/L (ref 3.8–5.8)
RBC #/AREA URNS HPF: ABNORMAL /HPF (ref 0–2)
SODIUM SERPL-SCNC: 143 MMOL/L (ref 132–146)
SP GR UR STRIP: 1.02 (ref 1–1.03)
TEAR DROP CELLS: ABNORMAL
UROBILINOGEN UR STRIP-ACNC: 0.2 E.U./DL
WBC # BLD: 4.7 E9/L (ref 4.5–11.5)
WBC #/AREA URNS HPF: ABNORMAL /HPF (ref 0–5)

## 2023-06-19 PROCEDURE — G0378 HOSPITAL OBSERVATION PER HR: HCPCS

## 2023-06-19 PROCEDURE — 74177 CT ABD & PELVIS W/CONTRAST: CPT

## 2023-06-19 PROCEDURE — 96375 TX/PRO/DX INJ NEW DRUG ADDON: CPT

## 2023-06-19 PROCEDURE — 51702 INSERT TEMP BLADDER CATH: CPT

## 2023-06-19 PROCEDURE — 87088 URINE BACTERIA CULTURE: CPT

## 2023-06-19 PROCEDURE — 81001 URINALYSIS AUTO W/SCOPE: CPT

## 2023-06-19 PROCEDURE — 6370000000 HC RX 637 (ALT 250 FOR IP): Performed by: INTERNAL MEDICINE

## 2023-06-19 PROCEDURE — 2580000003 HC RX 258: Performed by: STUDENT IN AN ORGANIZED HEALTH CARE EDUCATION/TRAINING PROGRAM

## 2023-06-19 PROCEDURE — 6360000004 HC RX CONTRAST MEDICATION: Performed by: RADIOLOGY

## 2023-06-19 PROCEDURE — 85025 COMPLETE CBC W/AUTO DIFF WBC: CPT

## 2023-06-19 PROCEDURE — 80048 BASIC METABOLIC PNL TOTAL CA: CPT

## 2023-06-19 PROCEDURE — 96374 THER/PROPH/DIAG INJ IV PUSH: CPT

## 2023-06-19 PROCEDURE — 99285 EMERGENCY DEPT VISIT HI MDM: CPT

## 2023-06-19 PROCEDURE — 88112 CYTOPATH CELL ENHANCE TECH: CPT

## 2023-06-19 PROCEDURE — 6360000002 HC RX W HCPCS: Performed by: STUDENT IN AN ORGANIZED HEALTH CARE EDUCATION/TRAINING PROGRAM

## 2023-06-19 RX ORDER — ATORVASTATIN CALCIUM 20 MG/1
20 TABLET, FILM COATED ORAL DAILY
Status: DISCONTINUED | OUTPATIENT
Start: 2023-06-19 | End: 2023-06-24 | Stop reason: HOSPADM

## 2023-06-19 RX ORDER — LIDOCAINE HYDROCHLORIDE 20 MG/ML
JELLY TOPICAL PRN
Status: DISCONTINUED | OUTPATIENT
Start: 2023-06-19 | End: 2023-06-24 | Stop reason: HOSPADM

## 2023-06-19 RX ORDER — CEFTRIAXONE 2 G/1
INJECTION, POWDER, FOR SOLUTION INTRAMUSCULAR; INTRAVENOUS
Status: DISPENSED
Start: 2023-06-19 | End: 2023-06-19

## 2023-06-19 RX ORDER — FENTANYL CITRATE 50 UG/ML
25 INJECTION, SOLUTION INTRAMUSCULAR; INTRAVENOUS ONCE
Status: COMPLETED | OUTPATIENT
Start: 2023-06-19 | End: 2023-06-19

## 2023-06-19 RX ORDER — MORPHINE SULFATE 8 MG/ML
6 INJECTION, SOLUTION INTRAMUSCULAR; INTRAVENOUS ONCE
Status: DISCONTINUED | OUTPATIENT
Start: 2023-06-19 | End: 2023-06-24 | Stop reason: HOSPADM

## 2023-06-19 RX ORDER — LISINOPRIL 10 MG/1
10 TABLET ORAL DAILY
Status: DISCONTINUED | OUTPATIENT
Start: 2023-06-19 | End: 2023-06-24 | Stop reason: HOSPADM

## 2023-06-19 RX ADMIN — FENTANYL CITRATE 25 MCG: 50 INJECTION, SOLUTION INTRAMUSCULAR; INTRAVENOUS at 11:59

## 2023-06-19 RX ADMIN — LISINOPRIL 10 MG: 10 TABLET ORAL at 19:44

## 2023-06-19 RX ADMIN — ATORVASTATIN CALCIUM 20 MG: 20 TABLET, FILM COATED ORAL at 19:44

## 2023-06-19 RX ADMIN — CEFTRIAXONE SODIUM 2000 MG: 2 INJECTION, POWDER, FOR SOLUTION INTRAMUSCULAR; INTRAVENOUS at 12:03

## 2023-06-19 RX ADMIN — IOPAMIDOL 75 ML: 755 INJECTION, SOLUTION INTRAVENOUS at 15:38

## 2023-06-19 ASSESSMENT — PAIN DESCRIPTION - LOCATION
LOCATION: PENIS
LOCATION: PELVIS
LOCATION: ABDOMEN

## 2023-06-19 ASSESSMENT — LIFESTYLE VARIABLES
HOW OFTEN DO YOU HAVE A DRINK CONTAINING ALCOHOL: NEVER
HOW MANY STANDARD DRINKS CONTAINING ALCOHOL DO YOU HAVE ON A TYPICAL DAY: PATIENT DOES NOT DRINK

## 2023-06-19 ASSESSMENT — PAIN - FUNCTIONAL ASSESSMENT: PAIN_FUNCTIONAL_ASSESSMENT: 0-10

## 2023-06-19 ASSESSMENT — PAIN SCALES - GENERAL
PAINLEVEL_OUTOF10: 10
PAINLEVEL_OUTOF10: 10
PAINLEVEL_OUTOF10: 8

## 2023-06-19 ASSESSMENT — PAIN DESCRIPTION - FREQUENCY: FREQUENCY: CONTINUOUS

## 2023-06-19 ASSESSMENT — PAIN DESCRIPTION - DESCRIPTORS
DESCRIPTORS: ACHING;CRAMPING
DESCRIPTORS: BURNING;ACHING

## 2023-06-19 ASSESSMENT — PAIN DESCRIPTION - ONSET: ONSET: ON-GOING

## 2023-06-19 ASSESSMENT — PAIN DESCRIPTION - ORIENTATION: ORIENTATION: LOWER

## 2023-06-19 ASSESSMENT — PAIN DESCRIPTION - PAIN TYPE: TYPE: ACUTE PAIN

## 2023-06-19 NOTE — ED PROVIDER NOTES
[facility]  EMERGENCY DEPARTMENT ENCOUNTER        Pt Name: Luis E Milian  MRN: [id number]  [place] 1940  Date of evaluation: 6/19/2023  Provider: Robert Orona DO  PCP: Tiffanie Daniels MD  Note Started: 8:27 AM EDT 6/19/23    CHIEF COMPLAINT       Chief Complaint   Patient presents with    Urinary Retention     Pt states has be unable to urinate for the past 5 hours is having pain in the suprapubic area states he feels very uncomfortable        HISTORY OF PRESENT ILLNESS: 1 or more Elements   History From: Patient    Limitations to history : None    Luis E Milian is a [de-identified] y.o. male with past medical history of hypertension, hyperlipidemia and bladder cancer in remission who presents to the emergency department due to urinary retention. Patient states that he has not been able to urinate over the past 5 hours. Patient is feeling a lot of discomfort in his suprapubic and groin region. Patient noting remote history of urinary retention. He states that he had bladder cancer and he is in remission since 2015. Patient did follow-up with urology in the past but has not recently. Patient otherwise denying any other associated symptoms including nausea, vomiting, fever, chills, chest pain, shortness of breath,  numbness, dysuria, urgency, frequency, flank pain, hematuria, tingling, low back pain, constipation or diarrhea. On initial assessment, patient is well-appearing and in no acute distress. Nursing Notes were all reviewed and agreed with or any disagreements were addressed in the HPI.    ROS:   Pertinent positives and negatives are stated within HPI, all other systems reviewed and are negative.    --------------------------------------------- PAST HISTORY ---------------------------------------------  Past Medical History:  has a past medical history of Hyperlipidemia and Hypertension. Past Surgical History:  has a past surgical history that includes vascular surgery.     Social

## 2023-06-19 NOTE — ED NOTES
ED to Inpatient Handoff Report    Notified rn that electronic handoff available and patient ready for transport to room 505. Safety Risks: fall    Patient in Restraints: no    Constant Observer or Patient : no    Telemetry Monitoring Ordered :No           Order to transfer to unit without monitor:N/A    Last MEWS: 1 Time completed: 1501    Vitals:    06/19/23 0813 06/19/23 1256 06/19/23 1500 06/19/23 1501   BP: (!) 200/91 (!) 151/86 (!) 152/67 (!) 152/67   Pulse: 99 (!) 112 70 71   Resp: 20   17   Temp: 97.5 °F (36.4 °C)   97.8 °F (36.6 °C)   TempSrc: Oral   Oral   SpO2: 100% 98% 97% 97%   Weight: 160 lb (72.6 kg)      Height: 5' 10\" (1.778 m)          Opportunity for questions and clarification was provided.         Amanda Alicea RN  06/19/23 9431

## 2023-06-19 NOTE — CONSULTS
6/19/2023 2:42 PM  Domonique Moseley  [id number]     Chief Complaint:    Prery Dang hematuria      History of Present Illness: The patient is a [de-identified] y.o. male patient who presented to the hospital with complaints of urinary retention and gross hematuria. He had a 16F pagan inserted in the ER, but this became bloody and was removed. Attempts to place a 3 way pagan were unsuccessful and Urology was consulted. He has a history of prostate cancer and underwent radical prostatectomy in 2001 by Dr. Karol Urena at [facility]. He developed PSA recurrence and underwent radiation therapy. In 2015 he had some gross hematuria and underwent a cystoscopy with TURBT with Dr. Delroy Moore at [facility] followed by [facility] shortly after. He then developed another recurrence which was superficial and noninvasive. In April 2016 he saw Dr. Anner Holter. He underwent bladder biopsy and pathology was benign but he was recommended BCG maintenance therapy. He has not had a cystoscopy since 2019 that he remembers. Past Medical History:   Diagnosis Date    Hyperlipidemia     Hypertension          Past Surgical History:   Procedure Laterality Date    VASCULAR SURGERY         Medications Prior to Admission:    Not in a hospital admission. Allergies:    Patient has no known allergies. Social History:    reports that he has never smoked. He has never used smokeless tobacco. He reports that he does not drink alcohol. Family History:   Non-contributory to this Urological problem  family history is not on file.     Review of Systems:  Constitutional: No fever or chills   Respiratory: negative for cough and hemoptysis  Cardiovascular: negative for chest pain and dyspnea  Gastrointestinal: negative for abdominal pain, diarrhea, nausea and vomiting   Derm: negative for rash and skin lesion(s)  Neurological: negative for seizures and tremors  Musculoskeletal: Negative    Psychiatric: Negative   : As above in the HPI, otherwise

## 2023-06-20 LAB
ABO + RH BLD: NORMAL
ANION GAP SERPL CALCULATED.3IONS-SCNC: 10 MMOL/L (ref 7–16)
ANISOCYTOSIS: ABNORMAL
BASOPHILS # BLD: 0.03 E9/L (ref 0–0.2)
BASOPHILS NFR BLD: 0.9 % (ref 0–2)
BLASTS NFR BLD MANUAL: 2.7 % (ref 0–0)
BLD GP AB SCN SERPL QL: NORMAL
BUN SERPL-MCNC: 21 MG/DL (ref 6–23)
CALCIUM SERPL-MCNC: 8.8 MG/DL (ref 8.6–10.2)
CHLORIDE SERPL-SCNC: 107 MMOL/L (ref 98–107)
CO2 SERPL-SCNC: 25 MMOL/L (ref 22–29)
CREAT SERPL-MCNC: 1.1 MG/DL (ref 0.7–1.2)
EOSINOPHIL # BLD: 0 E9/L (ref 0.05–0.5)
EOSINOPHIL NFR BLD: 0 % (ref 0–6)
ERYTHROCYTE [DISTWIDTH] IN BLOOD BY AUTOMATED COUNT: 18.2 FL (ref 11.5–15)
GLUCOSE SERPL-MCNC: 102 MG/DL (ref 74–99)
HCT VFR BLD AUTO: 25.8 % (ref 37–54)
HGB BLD-MCNC: 8.5 G/DL (ref 12.5–16.5)
IMMATURE RETIC FRACT: 33.1 % (ref 2.3–13.4)
LYMPHOCYTES # BLD: 1.63 E9/L (ref 1.5–4)
LYMPHOCYTES NFR BLD: 47.8 % (ref 20–42)
MCH RBC QN AUTO: 28.6 PG (ref 26–35)
MCHC RBC AUTO-ENTMCNC: 32.9 % (ref 32–34.5)
MCV RBC AUTO: 86.9 FL (ref 80–99.9)
METAMYELOCYTES NFR BLD MANUAL: 0.9 % (ref 0–1)
MONOCYTES # BLD: 0.17 E9/L (ref 0.1–0.95)
MONOCYTES NFR BLD: 4.5 % (ref 2–12)
MYELOCYTES NFR BLD MANUAL: 3.6 % (ref 0–0)
NEUTROPHILS # BLD: 1.5 E9/L (ref 1.8–7.3)
NEUTS SEG NFR BLD: 39.6 % (ref 43–80)
NRBC BLD-RTO: 18.9 /100 WBC
OVALOCYTES: ABNORMAL
PATHOLOGIST REVIEW: NORMAL
PLATELET # BLD AUTO: 31 E9/L (ref 130–450)
PLATELET CONFIRMATION: NORMAL
PMV BLD AUTO: ABNORMAL FL (ref 7–12)
POIKILOCYTES: ABNORMAL
POLYCHROMASIA: ABNORMAL
POTASSIUM SERPL-SCNC: 4.2 MMOL/L (ref 3.5–5)
PSA SERPL-MCNC: 0.04 NG/ML (ref 0–4)
RBC # BLD AUTO: 2.97 E12/L (ref 3.8–5.8)
RETIC HGB EQUIVALENT: 29.6 PG (ref 28.2–36.6)
RETICS/RBC NFR: 3.5 % (ref 0.4–1.9)
RETICULOCYTE ABSOLUTE COUNT: 0.1 E12/L
SCHISTOCYTES: ABNORMAL
SODIUM SERPL-SCNC: 142 MMOL/L (ref 132–146)
TEAR DROP CELLS: ABNORMAL
WBC # BLD: 3.4 E9/L (ref 4.5–11.5)

## 2023-06-20 PROCEDURE — 85045 AUTOMATED RETICULOCYTE COUNT: CPT

## 2023-06-20 PROCEDURE — P9016 RBC LEUKOCYTES REDUCED: HCPCS

## 2023-06-20 PROCEDURE — 86901 BLOOD TYPING SEROLOGIC RH(D): CPT

## 2023-06-20 PROCEDURE — 36415 COLL VENOUS BLD VENIPUNCTURE: CPT

## 2023-06-20 PROCEDURE — 85025 COMPLETE CBC W/AUTO DIFF WBC: CPT

## 2023-06-20 PROCEDURE — 84153 ASSAY OF PSA TOTAL: CPT

## 2023-06-20 PROCEDURE — 86850 RBC ANTIBODY SCREEN: CPT

## 2023-06-20 PROCEDURE — G0378 HOSPITAL OBSERVATION PER HR: HCPCS

## 2023-06-20 PROCEDURE — 80048 BASIC METABOLIC PNL TOTAL CA: CPT

## 2023-06-20 PROCEDURE — 86923 COMPATIBILITY TEST ELECTRIC: CPT

## 2023-06-20 PROCEDURE — 6370000000 HC RX 637 (ALT 250 FOR IP): Performed by: INTERNAL MEDICINE

## 2023-06-20 PROCEDURE — 86900 BLOOD TYPING SEROLOGIC ABO: CPT

## 2023-06-20 PROCEDURE — P9073 PLATELETS PHERESIS PATH REDU: HCPCS

## 2023-06-20 PROCEDURE — 36430 TRANSFUSION BLD/BLD COMPNT: CPT

## 2023-06-20 RX ORDER — SODIUM CHLORIDE 9 MG/ML
INJECTION, SOLUTION INTRAVENOUS PRN
Status: DISCONTINUED | OUTPATIENT
Start: 2023-06-20 | End: 2023-06-24 | Stop reason: HOSPADM

## 2023-06-20 RX ORDER — AMLODIPINE BESYLATE 5 MG/1
5 TABLET ORAL DAILY
Status: DISCONTINUED | OUTPATIENT
Start: 2023-06-20 | End: 2023-06-24 | Stop reason: HOSPADM

## 2023-06-20 RX ADMIN — AMLODIPINE BESYLATE 5 MG: 5 TABLET ORAL at 08:16

## 2023-06-20 RX ADMIN — ATORVASTATIN CALCIUM 20 MG: 20 TABLET, FILM COATED ORAL at 08:16

## 2023-06-20 RX ADMIN — LISINOPRIL 10 MG: 10 TABLET ORAL at 08:16

## 2023-06-20 NOTE — CARE COORDINATION
Social Work:    Transition of care  met with Mr. Jennifer Chou & his son Zoey Hughes, advised them about social work &  roles, as well as discussed discharge planning  Mr. Jennifer Chou resides alone independently in his 2-story home. He has no durable medical equipment, has never used [facility] or [facility], and presently has no discharge needs. Social work to continue to follow.     Electronically signed by YEMI Sims on 6/20/2023 at 10:56 AM

## 2023-06-20 NOTE — PATIENT CARE CONFERENCE
[facility] Quality Flow/Interdisciplinary Rounds Progress Note        Quality Flow Rounds held on June 20, 2023    Disciplines Attending:  Bedside Nurse, , , and Nursing Unit Leadership    Shona Victor was admitted on 6/19/2023  8:09 AM    Anticipated Discharge Date:       Disposition:    Zak Score:  Zak Scale Score: 22    Readmission Risk              Risk of Unplanned Readmission:  0           Discussed patient goal for the day, patient clinical progression, and barriers to discharge.   The following Goal(s) of the Day/Commitment(s) have been identified:  Diagnostics - Report Results poss OR today      Kyra Quezada RN  June 20, 2023

## 2023-06-20 NOTE — H&P
[address] [facility]                  [address]                              HISTORY AND PHYSICAL    PATIENT NAME: Braxton Sever                      :        1940  MED REC NO:   [id number]                            ROOM:       0505  ACCOUNT NO:   [de-identified]                           ADMIT DATE: 2023  PROVIDER:     Hany Phelps MD    CHIEF COMPLAINT:  Grossly hematuria. HISTORY OF PRESENT ILLNESS:  The patient was brought to the emergency  room for a new onset of gross hematuria. The patient has never had  similar symptoms. He has not had any significant history of kidney  stones. 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 He has not had any  recent urology evaluation, which he typically has received at [facility]. His PSAs have remained undetectable. 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 This  has been associated with recurring anemia and need for fairly frequent  blood transfusions. He also has significant thrombocytopenia associated  with this with platelet counts often in the 30s. He follows closely  with Dr. Gustavo Cano for this. For his myelofibrosis, he is on Vonjo 100 mg  two capsules twice a day. The patient's gross hematuria is persisting. They were apparently unable to initiate three-way irrigation in the  emergency room and the patient has been receiving frequent irrigation of  his Gonzalez catheter overnight. He has persistent gross hematuria this  morning. The patient otherwise voices no complaints, but is concerned  about why he is bleeding.   CAT scan of the abdomen did not show any  obvious abnormalities except for a perinephric nodule that Radiology  postulated could be a ruptured kidney cyst.  Urology has

## 2023-06-20 NOTE — ACP (ADVANCE CARE PLANNING)
Advance Care Planning   Healthcare Decision Maker:    Primary Decision Maker: Griffin Bone - [phone]    Click here to complete Healthcare Decision Makers including selection of the Healthcare Decision Maker Relationship (ie \"Primary\").

## 2023-06-21 LAB
ANION GAP SERPL CALCULATED.3IONS-SCNC: 11 MMOL/L (ref 7–16)
BACTERIA UR CULT: NORMAL
BUN SERPL-MCNC: 28 MG/DL (ref 6–23)
CALCIUM SERPL-MCNC: 8.5 MG/DL (ref 8.6–10.2)
CHLORIDE SERPL-SCNC: 106 MMOL/L (ref 98–107)
CO2 SERPL-SCNC: 23 MMOL/L (ref 22–29)
CREAT SERPL-MCNC: 1.3 MG/DL (ref 0.7–1.2)
ERYTHROCYTE [DISTWIDTH] IN BLOOD BY AUTOMATED COUNT: 17.7 FL (ref 11.5–15)
ERYTHROCYTE [DISTWIDTH] IN BLOOD BY AUTOMATED COUNT: 17.9 FL (ref 11.5–15)
GLUCOSE SERPL-MCNC: 94 MG/DL (ref 74–99)
HCT VFR BLD AUTO: 22.5 % (ref 37–54)
HCT VFR BLD AUTO: 24.4 % (ref 37–54)
HGB BLD-MCNC: 7.4 G/DL (ref 12.5–16.5)
HGB BLD-MCNC: 7.9 G/DL (ref 12.5–16.5)
MCH RBC QN AUTO: 28.2 PG (ref 26–35)
MCH RBC QN AUTO: 28.9 PG (ref 26–35)
MCHC RBC AUTO-ENTMCNC: 32.4 % (ref 32–34.5)
MCHC RBC AUTO-ENTMCNC: 32.9 % (ref 32–34.5)
MCV RBC AUTO: 87.1 FL (ref 80–99.9)
MCV RBC AUTO: 87.9 FL (ref 80–99.9)
PLATELET # BLD AUTO: 41 E9/L (ref 130–450)
PLATELET # BLD AUTO: 47 E9/L (ref 130–450)
PLATELET CONFIRMATION: NORMAL
PLATELET CONFIRMATION: NORMAL
PMV BLD AUTO: ABNORMAL FL (ref 7–12)
PMV BLD AUTO: ABNORMAL FL (ref 7–12)
POTASSIUM SERPL-SCNC: 4.2 MMOL/L (ref 3.5–5)
RBC # BLD AUTO: 2.56 E12/L (ref 3.8–5.8)
RBC # BLD AUTO: 2.8 E12/L (ref 3.8–5.8)
SODIUM SERPL-SCNC: 140 MMOL/L (ref 132–146)
WBC # BLD: 3.2 E9/L (ref 4.5–11.5)
WBC # BLD: 3.7 E9/L (ref 4.5–11.5)

## 2023-06-21 PROCEDURE — 36430 TRANSFUSION BLD/BLD COMPNT: CPT

## 2023-06-21 PROCEDURE — 80048 BASIC METABOLIC PNL TOTAL CA: CPT

## 2023-06-21 PROCEDURE — G0378 HOSPITAL OBSERVATION PER HR: HCPCS

## 2023-06-21 PROCEDURE — 36415 COLL VENOUS BLD VENIPUNCTURE: CPT

## 2023-06-21 PROCEDURE — 85027 COMPLETE CBC AUTOMATED: CPT

## 2023-06-21 PROCEDURE — 6370000000 HC RX 637 (ALT 250 FOR IP): Performed by: INTERNAL MEDICINE

## 2023-06-21 RX ORDER — SODIUM CHLORIDE 9 MG/ML
INJECTION, SOLUTION INTRAVENOUS PRN
Status: DISCONTINUED | OUTPATIENT
Start: 2023-06-21 | End: 2023-06-24 | Stop reason: HOSPADM

## 2023-06-21 RX ADMIN — ATORVASTATIN CALCIUM 20 MG: 20 TABLET, FILM COATED ORAL at 07:50

## 2023-06-21 RX ADMIN — LISINOPRIL 10 MG: 10 TABLET ORAL at 07:51

## 2023-06-21 RX ADMIN — AMLODIPINE BESYLATE 5 MG: 5 TABLET ORAL at 07:51

## 2023-06-21 ASSESSMENT — PAIN DESCRIPTION - DESCRIPTORS: DESCRIPTORS: BURNING;ACHING

## 2023-06-21 ASSESSMENT — PAIN DESCRIPTION - ORIENTATION: ORIENTATION: LOWER

## 2023-06-21 ASSESSMENT — PAIN DESCRIPTION - FREQUENCY: FREQUENCY: CONTINUOUS

## 2023-06-21 ASSESSMENT — PAIN DESCRIPTION - PAIN TYPE: TYPE: ACUTE PAIN

## 2023-06-21 ASSESSMENT — PAIN DESCRIPTION - ONSET: ONSET: ON-GOING

## 2023-06-21 ASSESSMENT — PAIN SCALES - GENERAL: PAINLEVEL_OUTOF10: 0

## 2023-06-21 ASSESSMENT — PAIN DESCRIPTION - LOCATION: LOCATION: PELVIS

## 2023-06-21 NOTE — PATIENT CARE CONFERENCE
P Quality Flow/Interdisciplinary Rounds Progress Note        Quality Flow Rounds held on June 21, 2023    Disciplines Attending:  Bedside Nurse, , , and Nursing Unit Leadership    Tiera Montero was admitted on 6/19/2023  8:09 AM    Anticipated Discharge Date:       Disposition:    Zak Score:  Zak Scale Score: 21    Readmission Risk              Risk of Unplanned Readmission:  0           Discussed patient goal for the day, patient clinical progression, and barriers to discharge.   The following Goal(s) of the Day/Commitment(s) have been identified:  Labs - Report Results      Martha Thurston RN  June 21, 2023

## 2023-06-22 ENCOUNTER — ANESTHESIA EVENT (OUTPATIENT)
Dept: OPERATING ROOM | Age: 83
End: 2023-06-22
Payer: COMMERCIAL

## 2023-06-22 ENCOUNTER — APPOINTMENT (OUTPATIENT)
Dept: ULTRASOUND IMAGING | Age: 83
End: 2023-06-22
Payer: COMMERCIAL

## 2023-06-22 LAB
ANION GAP SERPL CALCULATED.3IONS-SCNC: 8 MMOL/L (ref 7–16)
ANISOCYTOSIS: ABNORMAL
BASOPHILS # BLD: 0.02 E9/L (ref 0–0.2)
BASOPHILS NFR BLD: 0.9 % (ref 0–2)
BLASTS NFR BLD MANUAL: 4.3 % (ref 0–0)
BLOOD BANK DISPENSE STATUS: NORMAL
BLOOD BANK PRODUCT CODE: NORMAL
BPU ID: NORMAL
BUN SERPL-MCNC: 23 MG/DL (ref 6–23)
CALCIUM SERPL-MCNC: 8.9 MG/DL (ref 8.6–10.2)
CHLORIDE SERPL-SCNC: 107 MMOL/L (ref 98–107)
CO2 SERPL-SCNC: 26 MMOL/L (ref 22–29)
CREAT SERPL-MCNC: 1.2 MG/DL (ref 0.7–1.2)
DESCRIPTION BLOOD BANK: NORMAL
EOSINOPHIL # BLD: 0.02 E9/L (ref 0.05–0.5)
EOSINOPHIL NFR BLD: 0.9 % (ref 0–6)
ERYTHROCYTE [DISTWIDTH] IN BLOOD BY AUTOMATED COUNT: 17.9 FL (ref 11.5–15)
FERRITIN SERPL-MCNC: 1205 NG/ML
FOLATE SERPL-MCNC: 18.2 NG/ML (ref 4.8–24.2)
GLUCOSE SERPL-MCNC: 104 MG/DL (ref 74–99)
HCT VFR BLD AUTO: 21.9 % (ref 37–54)
HCT VFR BLD AUTO: 25 % (ref 37–54)
HGB BLD-MCNC: 7.3 G/DL (ref 12.5–16.5)
HGB BLD-MCNC: 8.3 G/DL (ref 12.5–16.5)
IRON SATN MFR SERPL: 44 % (ref 20–55)
IRON SERPL-MCNC: 75 MCG/DL (ref 59–158)
LYMPHOCYTES # BLD: 1.49 E9/L (ref 1.5–4)
LYMPHOCYTES NFR BLD: 54.8 % (ref 20–42)
MCH RBC QN AUTO: 29.2 PG (ref 26–35)
MCHC RBC AUTO-ENTMCNC: 33.3 % (ref 32–34.5)
MCV RBC AUTO: 87.6 FL (ref 80–99.9)
MONOCYTES # BLD: 0.11 E9/L (ref 0.1–0.95)
MONOCYTES NFR BLD: 3.5 % (ref 2–12)
MYELOCYTES NFR BLD MANUAL: 1.7 % (ref 0–0)
NEUTROPHILS # BLD: 0.97 E9/L (ref 1.8–7.3)
NEUTS SEG NFR BLD: 33.9 % (ref 43–80)
NRBC BLD-RTO: 5.2 /100 WBC
OVALOCYTES: ABNORMAL
PLATELET # BLD AUTO: 43 E9/L (ref 130–450)
PLATELET CONFIRMATION: NORMAL
PMV BLD AUTO: ABNORMAL FL (ref 7–12)
POIKILOCYTES: ABNORMAL
POLYCHROMASIA: ABNORMAL
POTASSIUM SERPL-SCNC: 4.3 MMOL/L (ref 3.5–5)
RBC # BLD AUTO: 2.5 E12/L (ref 3.8–5.8)
SODIUM SERPL-SCNC: 141 MMOL/L (ref 132–146)
TEAR DROP CELLS: ABNORMAL
TIBC SERPL-MCNC: 171 MCG/DL (ref 250–450)
VIT B12 SERPL-MCNC: 651 PG/ML (ref 211–946)
WBC # BLD: 2.7 E9/L (ref 4.5–11.5)

## 2023-06-22 PROCEDURE — 85025 COMPLETE CBC W/AUTO DIFF WBC: CPT

## 2023-06-22 PROCEDURE — 85014 HEMATOCRIT: CPT

## 2023-06-22 PROCEDURE — 83540 ASSAY OF IRON: CPT

## 2023-06-22 PROCEDURE — 6370000000 HC RX 637 (ALT 250 FOR IP): Performed by: INTERNAL MEDICINE

## 2023-06-22 PROCEDURE — G0378 HOSPITAL OBSERVATION PER HR: HCPCS

## 2023-06-22 PROCEDURE — 83550 IRON BINDING TEST: CPT

## 2023-06-22 PROCEDURE — 82607 VITAMIN B-12: CPT

## 2023-06-22 PROCEDURE — 36415 COLL VENOUS BLD VENIPUNCTURE: CPT

## 2023-06-22 PROCEDURE — 80048 BASIC METABOLIC PNL TOTAL CA: CPT

## 2023-06-22 PROCEDURE — 82728 ASSAY OF FERRITIN: CPT

## 2023-06-22 PROCEDURE — 36430 TRANSFUSION BLD/BLD COMPNT: CPT

## 2023-06-22 PROCEDURE — 85018 HEMOGLOBIN: CPT

## 2023-06-22 PROCEDURE — G0378 HOSPITAL OBSERVATION PER HR: HCPCS | Performed by: STUDENT IN AN ORGANIZED HEALTH CARE EDUCATION/TRAINING PROGRAM

## 2023-06-22 PROCEDURE — 76775 US EXAM ABDO BACK WALL LIM: CPT | Performed by: NURSE PRACTITIONER

## 2023-06-22 PROCEDURE — 82746 ASSAY OF FOLIC ACID SERUM: CPT

## 2023-06-22 RX ORDER — SODIUM CHLORIDE 9 MG/ML
INJECTION, SOLUTION INTRAVENOUS PRN
Status: COMPLETED | OUTPATIENT
Start: 2023-06-22 | End: 2023-06-24

## 2023-06-22 RX ADMIN — AMLODIPINE BESYLATE 5 MG: 5 TABLET ORAL at 08:19

## 2023-06-22 RX ADMIN — LISINOPRIL 10 MG: 10 TABLET ORAL at 08:19

## 2023-06-22 RX ADMIN — ATORVASTATIN CALCIUM 20 MG: 20 TABLET, FILM COATED ORAL at 08:19

## 2023-06-22 NOTE — PATIENT CARE CONFERENCE
[facility] Quality Flow/Interdisciplinary Rounds Progress Note        Quality Flow Rounds held on June 22, 2023    Disciplines Attending:  Bedside Nurse, , , and Nursing Unit Leadership    Mil Francisco was admitted on 6/19/2023  8:09 AM    Anticipated Discharge Date:  Expected Discharge Date: 06/23/23    Disposition:    Zak Score:  Zak Scale Score: 21    Readmission Risk              Risk of Unplanned Readmission:  0           Discussed patient goal for the day, patient clinical progression, and barriers to discharge.   The following Goal(s) of the Day/Commitment(s) have been identified:  Labs - Report Results      Debbie Nicolas RN  June 22, 2023

## 2023-06-22 NOTE — CARE COORDINATION
Social Work:    Mr. Delroy Klein is expected to receive 2 units of platelets prior to cystoscopy & possible biopsy Friday a.m. Social work will need to confirm whether or not Mr. Delroy Klein would like [employer] or has any DME needs.      Electronically signed by YEMI Camilo on 6/22/2023 at 2:48 PM

## 2023-06-23 ENCOUNTER — ANESTHESIA (OUTPATIENT)
Dept: OPERATING ROOM | Age: 83
End: 2023-06-23
Payer: COMMERCIAL

## 2023-06-23 ENCOUNTER — APPOINTMENT (OUTPATIENT)
Dept: GENERAL RADIOLOGY | Age: 83
End: 2023-06-23
Payer: COMMERCIAL

## 2023-06-23 LAB
ANISOCYTOSIS: ABNORMAL
BASOPHILS # BLD: 0 E9/L (ref 0–0.2)
BASOPHILS NFR BLD: 0 % (ref 0–2)
BLOOD BANK DISPENSE STATUS: NORMAL
BLOOD BANK PRODUCT CODE: NORMAL
BPU ID: NORMAL
DESCRIPTION BLOOD BANK: NORMAL
EOSINOPHIL # BLD: 0 E9/L (ref 0.05–0.5)
EOSINOPHIL NFR BLD: 0 % (ref 0–6)
ERYTHROCYTE [DISTWIDTH] IN BLOOD BY AUTOMATED COUNT: 16.9 FL (ref 11.5–15)
HCT VFR BLD AUTO: 22.7 % (ref 37–54)
HGB BLD-MCNC: 7.7 G/DL (ref 12.5–16.5)
LYMPHOCYTES # BLD: 1.33 E9/L (ref 1.5–4)
LYMPHOCYTES NFR BLD: 51 % (ref 20–42)
MCH RBC QN AUTO: 28.7 PG (ref 26–35)
MCHC RBC AUTO-ENTMCNC: 33.9 % (ref 32–34.5)
MCV RBC AUTO: 84.7 FL (ref 80–99.9)
METAMYELOCYTES NFR BLD MANUAL: 3 % (ref 0–1)
MONOCYTES # BLD: 0.08 E9/L (ref 0.1–0.95)
MONOCYTES NFR BLD: 3 % (ref 2–12)
MYELOCYTES NFR BLD MANUAL: 4 % (ref 0–0)
NEUTROPHILS # BLD: 1.2 E9/L (ref 1.8–7.3)
NEUTS SEG NFR BLD: 39 % (ref 43–80)
NRBC BLD-RTO: 6 /100 WBC
OVALOCYTES: ABNORMAL
PLATELET # BLD AUTO: 50 E9/L (ref 130–450)
PLATELET CONFIRMATION: NORMAL
PMV BLD AUTO: ABNORMAL FL (ref 7–12)
POIKILOCYTES: ABNORMAL
POLYCHROMASIA: ABNORMAL
RBC # BLD AUTO: 2.68 E12/L (ref 3.8–5.8)
TEAR DROP CELLS: ABNORMAL
WBC # BLD: 2.6 E9/L (ref 4.5–11.5)

## 2023-06-23 PROCEDURE — 2500000003 HC RX 250 WO HCPCS: Performed by: INTERNAL MEDICINE

## 2023-06-23 PROCEDURE — 88342 IMHCHEM/IMCYTCHM 1ST ANTB: CPT

## 2023-06-23 PROCEDURE — G0378 HOSPITAL OBSERVATION PER HR: HCPCS

## 2023-06-23 PROCEDURE — 96361 HYDRATE IV INFUSION ADD-ON: CPT

## 2023-06-23 PROCEDURE — 6360000002 HC RX W HCPCS: Performed by: NURSE ANESTHETIST, CERTIFIED REGISTERED

## 2023-06-23 PROCEDURE — 7100000011 HC PHASE II RECOVERY - ADDTL 15 MIN: Performed by: STUDENT IN AN ORGANIZED HEALTH CARE EDUCATION/TRAINING PROGRAM

## 2023-06-23 PROCEDURE — 6370000000 HC RX 637 (ALT 250 FOR IP): Performed by: INTERNAL MEDICINE

## 2023-06-23 PROCEDURE — 7100000010 HC PHASE II RECOVERY - FIRST 15 MIN: Performed by: STUDENT IN AN ORGANIZED HEALTH CARE EDUCATION/TRAINING PROGRAM

## 2023-06-23 PROCEDURE — 2580000003 HC RX 258: Performed by: PHYSICIAN ASSISTANT

## 2023-06-23 PROCEDURE — 3700000001 HC ADD 15 MINUTES (ANESTHESIA): Performed by: STUDENT IN AN ORGANIZED HEALTH CARE EDUCATION/TRAINING PROGRAM

## 2023-06-23 PROCEDURE — 88307 TISSUE EXAM BY PATHOLOGIST: CPT

## 2023-06-23 PROCEDURE — 2720000010 HC SURG SUPPLY STERILE: Performed by: STUDENT IN AN ORGANIZED HEALTH CARE EDUCATION/TRAINING PROGRAM

## 2023-06-23 PROCEDURE — 3600000002 HC SURGERY LEVEL 2 BASE: Performed by: STUDENT IN AN ORGANIZED HEALTH CARE EDUCATION/TRAINING PROGRAM

## 2023-06-23 PROCEDURE — 3600000012 HC SURGERY LEVEL 2 ADDTL 15MIN: Performed by: STUDENT IN AN ORGANIZED HEALTH CARE EDUCATION/TRAINING PROGRAM

## 2023-06-23 PROCEDURE — 74420 UROGRAPHY RTRGR +-KUB: CPT

## 2023-06-23 PROCEDURE — 2709999900 HC NON-CHARGEABLE SUPPLY: Performed by: STUDENT IN AN ORGANIZED HEALTH CARE EDUCATION/TRAINING PROGRAM

## 2023-06-23 PROCEDURE — 88341 IMHCHEM/IMCYTCHM EA ADD ANTB: CPT

## 2023-06-23 PROCEDURE — 3700000000 HC ANESTHESIA ATTENDED CARE: Performed by: STUDENT IN AN ORGANIZED HEALTH CARE EDUCATION/TRAINING PROGRAM

## 2023-06-23 PROCEDURE — 6360000004 HC RX CONTRAST MEDICATION: Performed by: STUDENT IN AN ORGANIZED HEALTH CARE EDUCATION/TRAINING PROGRAM

## 2023-06-23 PROCEDURE — 36415 COLL VENOUS BLD VENIPUNCTURE: CPT

## 2023-06-23 PROCEDURE — C1758 CATHETER, URETERAL: HCPCS | Performed by: STUDENT IN AN ORGANIZED HEALTH CARE EDUCATION/TRAINING PROGRAM

## 2023-06-23 PROCEDURE — 85025 COMPLETE CBC W/AUTO DIFF WBC: CPT

## 2023-06-23 RX ORDER — SODIUM CHLORIDE 9 MG/ML
INJECTION INTRAVENOUS
Status: DISPENSED
Start: 2023-06-23 | End: 2023-06-23

## 2023-06-23 RX ORDER — CEFAZOLIN SODIUM 1 G/3ML
INJECTION, POWDER, FOR SOLUTION INTRAMUSCULAR; INTRAVENOUS PRN
Status: DISCONTINUED | OUTPATIENT
Start: 2023-06-23 | End: 2023-06-23 | Stop reason: SDUPTHER

## 2023-06-23 RX ORDER — ENALAPRILAT 2.5 MG/2ML
0.62 INJECTION INTRAVENOUS ONCE
Status: COMPLETED | OUTPATIENT
Start: 2023-06-23 | End: 2023-06-23

## 2023-06-23 RX ORDER — WATER 1000 ML/1000ML
INJECTION, SOLUTION INTRAVENOUS
Status: DISPENSED
Start: 2023-06-23 | End: 2023-06-23

## 2023-06-23 RX ORDER — PROPOFOL 10 MG/ML
INJECTION, EMULSION INTRAVENOUS PRN
Status: DISCONTINUED | OUTPATIENT
Start: 2023-06-23 | End: 2023-06-23 | Stop reason: SDUPTHER

## 2023-06-23 RX ORDER — FENTANYL CITRATE 50 UG/ML
INJECTION, SOLUTION INTRAMUSCULAR; INTRAVENOUS PRN
Status: DISCONTINUED | OUTPATIENT
Start: 2023-06-23 | End: 2023-06-23 | Stop reason: SDUPTHER

## 2023-06-23 RX ORDER — CEFAZOLIN 2 G/1
INJECTION, POWDER, FOR SOLUTION INTRAMUSCULAR; INTRAVENOUS
Status: COMPLETED
Start: 2023-06-23 | End: 2023-06-23

## 2023-06-23 RX ORDER — PROPOFOL 10 MG/ML
INJECTION, EMULSION INTRAVENOUS CONTINUOUS PRN
Status: DISCONTINUED | OUTPATIENT
Start: 2023-06-23 | End: 2023-06-23 | Stop reason: SDUPTHER

## 2023-06-23 RX ADMIN — FENTANYL CITRATE 50 MCG: 50 INJECTION, SOLUTION INTRAMUSCULAR; INTRAVENOUS at 12:05

## 2023-06-23 RX ADMIN — LISINOPRIL 10 MG: 10 TABLET ORAL at 16:56

## 2023-06-23 RX ADMIN — SODIUM CHLORIDE: 9 INJECTION, SOLUTION INTRAVENOUS at 10:40

## 2023-06-23 RX ADMIN — ENALAPRILAT 0.62 MG: 1.25 INJECTION INTRAVENOUS at 20:49

## 2023-06-23 RX ADMIN — PROPOFOL 50 MG: 10 INJECTION, EMULSION INTRAVENOUS at 12:05

## 2023-06-23 RX ADMIN — FENTANYL CITRATE 50 MCG: 50 INJECTION, SOLUTION INTRAMUSCULAR; INTRAVENOUS at 11:58

## 2023-06-23 RX ADMIN — CEFAZOLIN 2 G: 1 INJECTION, POWDER, FOR SOLUTION INTRAMUSCULAR; INTRAVENOUS at 11:48

## 2023-06-23 RX ADMIN — PROPOFOL 120 MCG/KG/MIN: 10 INJECTION, EMULSION INTRAVENOUS at 11:58

## 2023-06-23 RX ADMIN — AMLODIPINE BESYLATE 5 MG: 5 TABLET ORAL at 09:08

## 2023-06-23 RX ADMIN — PROPOFOL 50 MG: 10 INJECTION, EMULSION INTRAVENOUS at 11:58

## 2023-06-23 ASSESSMENT — PAIN SCALES - GENERAL
PAINLEVEL_OUTOF10: 0
PAINLEVEL_OUTOF10: 0

## 2023-06-23 NOTE — OP NOTE
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], gross hematuria    Post-Op Diagnosis: Same       Procedure(s):  CYSTOSCOPY  EVACUATION OF CLOTS TRANSURETHRAL RESECTION BLADDER TUMOR small  WITH FULGURATION    Surgeon(s):  Eddie Payne MD    Assistant:   * No surgical staff found *    Anesthesia: Monitor Anesthesia Care    Estimated Blood Loss (mL): Minimal    Complications: None    Specimens:   ID Type Source Tests Collected by Time Destination   A : bladder mass Tissue Tissue SURGICAL PATHOLOGY Eddie Payne MD 6/23/2023 1212        Implants:  * No implants in log *      Drains:   Urinary Catheter 06/23/23 Gonzalez (Active)       [REMOVED] Urinary Catheter 06/19/23 Gonzalez;2 Way (Removed)   Catheter Indications Urinary retention (acute or chronic), continuous bladder irrigation or bladder outlet obstruction 06/19/23 0924   Urine Color Bloody 06/19/23 0924   Collection Container Standard 06/19/23 0924       [REMOVED] Urinary Catheter 06/19/23 [address] (Removed)   $ Urethral catheter insertion $ Not inserted for procedure 06/19/23 1539   Catheter Indications Urinary retention (acute or chronic), continuous bladder irrigation or bladder outlet obstruction 06/23/23 0900   Site Assessment Pink 06/23/23 0900   Urine Color Bloody 06/23/23 0900   Urine Appearance Clots 06/23/23 0900   Collection Container Standard 06/23/23 0900   Securement Method Leg strap 06/23/23 0900   Catheter Care  Perineal wipes 06/22/23 2115   Catheter Best Practices  Drainage tube clipped to bed;Catheter secured to thigh; Bag below bladder; Tamper seal intact; Bag not on floor; Lack of dependent loop in tubing;Drainage bag less than half full 06/23/23 0900   Status Draining;Manually irrigated 06/23/23 0900   Manual Irrigation Volume Input (mL) 120 mL 06/23/23 0900   Output (mL) 1000 mL 06/23/23 0603       Findings: small papillary

## 2023-06-23 NOTE — PATIENT CARE CONFERENCE
[facility] Quality Flow/Interdisciplinary Rounds Progress Note        Quality Flow Rounds held on June 23, 2023    Disciplines Attending:  Bedside Nurse, , , and Nursing Unit Leadership    Ruben Davis was admitted on 6/19/2023  8:09 AM    Anticipated Discharge Date:  Expected Discharge Date: 06/24/23    Disposition:    Zak Score:  Zak Scale Score: 21    Readmission Risk              Risk of Unplanned Readmission:  0           Discussed patient goal for the day, patient clinical progression, and barriers to discharge.   The following Goal(s) of the Day/Commitment(s) have been identified:  Labs - Report Results      Michell Boss RN  June 23, 2023

## 2023-06-23 NOTE — ANESTHESIA PRE PROCEDURE
Department of Anesthesiology  Preprocedure Note       Name:  Jelani Castillo   Age:  [de-identified] y.o.  :  1940                                          MRN:  [id number]         Date:  2023      Surgeon: Jaun Cross):  Susan Malhotra MD    Procedure: Procedure(s):  CYSTOSCOPY RETROGRADE PYELOGRAM EVACUATION OF CLOTS POSSIBLE TRANSURETHRAL RESECTION BLADDER TUMOR  WITH FULGURATION     ++REQ TO FOLLOW++    Medications prior to admission:   Prior to Admission medications    Medication Sig Start Date End Date Taking?  Authorizing Provider   VONJO 100 MG CAPS Take 100 mg by mouth in the morning and at bedtime PT TAKES AT 0800 AND 23   Historical Provider, MD   atorvastatin (LIPITOR) 20 MG tablet Take 1 tablet by mouth daily 22   Historical Provider, MD   lisinopril (PRINIVIL;ZESTRIL) 10 MG tablet Take 1 tablet by mouth daily 22   Historical Provider, MD   Icosapent Ethyl (VASCEPA) 1 g CAPS capsule Take 2 capsules by mouth 22   Historical Provider, MD   Cholecalciferol 50 MCG (2000) CAPS Take 2,000 Units by mouth daily    Historical Provider, MD   magnesium (MAGNESIUM-OXIDE) 250 MG TABS tablet Take 1 tablet by mouth daily    Historical Provider, MD   coenzyme Q10 200 MG CAPS capsule Take 1 capsule by mouth daily    Historical Provider, MD   aspirin 81 MG tablet Take 1 tablet by mouth daily    Historical Provider, MD       Current medications:    Current Facility-Administered Medications   Medication Dose Route Frequency Provider Last Rate Last Admin    0.9 % sodium chloride infusion   IntraVENous PRN ANDREW Guerrero        0.9 % sodium chloride infusion   IntraVENous PRN ANDREW Guerrero        amLODIPine (NORVASC) tablet 5 mg  5 mg Oral Daily Keon Stubbs MD   5 mg at 23 0819    0.9 % sodium chloride infusion   IntraVENous PRN ANDREW Guerrero        lidocaine (XYLOCAINE) 2 % uro-jet   Topical PRN Meagan Kate, APRN - CNP        morphine sulfate (PF) injection 6 mg  6 mg

## 2023-06-23 NOTE — PLAN OF CARE
Problem: Discharge Planning  Goal: Discharge to home or other facility with appropriate resources  6/22/2023 2254 by Luke Flowers  Outcome: Progressing  6/22/2023 1939 by Josef Rouse RN  Outcome: Progressing     Problem: Pain  Goal: Verbalizes/displays adequate comfort level or baseline comfort level  6/22/2023 2254 by Luke Flowers  Outcome: Progressing  6/22/2023 1939 by Josef Rouse RN  Outcome: Progressing

## 2023-06-23 NOTE — PLAN OF CARE
Problem: Discharge Planning  Goal: Discharge to home or other facility with appropriate resources  6/22/2023 2301 by Paradise Copeland  Outcome: Progressing  6/22/2023 2254 by Ana Cristina Dickson  Outcome: Progressing  6/22/2023 1939 by Josiane Lopez RN  Outcome: Progressing     Problem: Pain  Goal: Verbalizes/displays adequate comfort level or baseline comfort level  6/22/2023 2301 by Paradise Copeland  Outcome: Progressing  6/22/2023 2254 by Ana Cristina Dickson  Outcome: Progressing  6/22/2023 1939 by Josiane Lopez RN  Outcome: Progressing     Problem: Safety - Adult  Goal: Free from fall injury  Outcome: Progressing

## 2023-06-23 NOTE — PLAN OF CARE
Problem: Pain  Goal: Verbalizes/displays adequate comfort level or baseline comfort level  6/23/2023 1100 by Charis Capps RN  Outcome: Progressing  6/22/2023 2301 by Petra Cotton  Outcome: Progressing  6/22/2023 2254 by Ashlyn Mendoza  Outcome: Progressing

## 2023-06-24 VITALS
HEART RATE: 86 BPM | HEIGHT: 70 IN | RESPIRATION RATE: 18 BRPM | TEMPERATURE: 98.7 F | BODY MASS INDEX: 22.05 KG/M2 | SYSTOLIC BLOOD PRESSURE: 162 MMHG | OXYGEN SATURATION: 97 % | WEIGHT: 154 LBS | DIASTOLIC BLOOD PRESSURE: 70 MMHG

## 2023-06-24 LAB
ANISOCYTOSIS: ABNORMAL
BASOPHILS # BLD: 0 E9/L (ref 0–0.2)
BASOPHILS NFR BLD: 0 % (ref 0–2)
EOSINOPHIL # BLD: 0 E9/L (ref 0.05–0.5)
EOSINOPHIL NFR BLD: 0 % (ref 0–6)
ERYTHROCYTE [DISTWIDTH] IN BLOOD BY AUTOMATED COUNT: 17 FL (ref 11.5–15)
HCT VFR BLD AUTO: 22 % (ref 37–54)
HGB BLD-MCNC: 7.5 G/DL (ref 12.5–16.5)
LYMPHOCYTES # BLD: 1.29 E9/L (ref 1.5–4)
LYMPHOCYTES NFR BLD: 52 % (ref 20–42)
MCH RBC QN AUTO: 29.1 PG (ref 26–35)
MCHC RBC AUTO-ENTMCNC: 34.1 % (ref 32–34.5)
MCV RBC AUTO: 85.3 FL (ref 80–99.9)
MONOCYTES # BLD: 0.12 E9/L (ref 0.1–0.95)
MONOCYTES NFR BLD: 5 % (ref 2–12)
MYELOCYTES NFR BLD MANUAL: 3 % (ref 0–0)
NEUTROPHILS # BLD: 0.9 E9/L (ref 1.8–7.3)
NEUTS SEG NFR BLD: 36 % (ref 43–80)
NRBC BLD-RTO: 5 /100 WBC
OVALOCYTES: ABNORMAL
PLATELET # BLD AUTO: 45 E9/L (ref 130–450)
PLATELET CONFIRMATION: NORMAL
PMV BLD AUTO: ABNORMAL FL (ref 7–12)
POIKILOCYTES: ABNORMAL
POLYCHROMASIA: ABNORMAL
RBC # BLD AUTO: 2.58 E12/L (ref 3.8–5.8)
TEAR DROP CELLS: ABNORMAL
VARIANT LYMPHS NFR BLD: 4 % (ref 0–4)
WBC # BLD: 2.3 E9/L (ref 4.5–11.5)

## 2023-06-24 PROCEDURE — G0378 HOSPITAL OBSERVATION PER HR: HCPCS

## 2023-06-24 PROCEDURE — 6370000000 HC RX 637 (ALT 250 FOR IP): Performed by: INTERNAL MEDICINE

## 2023-06-24 PROCEDURE — 93005 ELECTROCARDIOGRAM TRACING: CPT | Performed by: INTERNAL MEDICINE

## 2023-06-24 PROCEDURE — 85025 COMPLETE CBC W/AUTO DIFF WBC: CPT

## 2023-06-24 PROCEDURE — 36415 COLL VENOUS BLD VENIPUNCTURE: CPT

## 2023-06-24 RX ORDER — LISINOPRIL 10 MG/1
20 TABLET ORAL DAILY
Qty: 30 TABLET | Refills: 3 | Status: SHIPPED | OUTPATIENT
Start: 2023-06-24

## 2023-06-24 RX ORDER — AMLODIPINE BESYLATE 5 MG/1
5 TABLET ORAL DAILY
Qty: 30 TABLET | Refills: 5 | Status: SHIPPED | OUTPATIENT
Start: 2023-06-25

## 2023-06-24 RX ADMIN — AMLODIPINE BESYLATE 5 MG: 5 TABLET ORAL at 07:46

## 2023-06-24 RX ADMIN — ATORVASTATIN CALCIUM 20 MG: 20 TABLET, FILM COATED ORAL at 07:46

## 2023-06-24 RX ADMIN — LISINOPRIL 10 MG: 10 TABLET ORAL at 07:46

## 2023-06-24 NOTE — PROGRESS NOTES
0830 manually irrigated catheter - clear, bloody urine returned. Patient has no pain or pressure. Tolerated well. 10:50 patient refused catheter irrigation at this time. He will call when done eating. 11:30 manually irrigated catheter - many large clots returned. Patient tolerated well. No pain or pressure. Clamped tubing per ultrasound request. Patient aware.
6/22/2023 10:37 AM  Dorene Hernandez  [id number]    Subjective:    His son is present   Pagan with cherry urine  His labs are pending today     Review of Systems  Constitutional: No fever or chills   Respiratory: negative for cough and hemoptysis  Cardiovascular: negative for chest pain and dyspnea  Gastrointestinal: negative for abdominal pain, diarrhea, nausea and vomiting   : See above  Derm: negative for rash and skin lesion(s)  Neurological: negative for seizures and tremors  Musculoskeletal: Negative    Psychiatric: Negative   All other reviews are negative      Scheduled Meds:   amLODIPine  5 mg Oral Daily    morphine  6 mg IntraVENous Once    atorvastatin  20 mg Oral Daily    lisinopril  10 mg Oral Daily    [Held by provider] Pacritinib Citrate  200 mg Oral BID       Objective:  Vitals:    06/22/23 0754   BP: (!) 159/69   Pulse: 79   Resp: 16   Temp: 98.2 °F (36.8 °C)   SpO2: 93%         Allergies: Patient has no known allergies.     General Appearance: alert and oriented to person, place and time and in no acute distress  Skin: no rash or erythema  Head: normocephalic and atraumatic  Pulmonary/Chest: normal air movement, no respiratory distress  Abdomen: soft, non-tender, non-distended  Genitourinary: pagan with cherry urine without visible clots   Extremities: no cyanosis, clubbing or edema         Labs:     Recent Labs     06/22/23  0845      K 4.3      CO2 26   BUN 23   CREATININE 1.2   GLUCOSE 104*   CALCIUM 8.9       Lab Results   Component Value Date/Time    HGB 7.4 06/21/2023 07:45 PM    HCT 22.5 06/21/2023 07:45 PM       Lab Results   Component Value Date    PSA 0.04 06/20/2023         Assessment/Plan:  Hx of prostate cancer s/p radical prostatectomy in 2001 followed by radiation therapy   Hx of bladder cancer, last cystoscopy in 2019 no recurrence (treated at [facility])  Gross hematuria   Clot retention   UTI   Myeloproliferative neoplasm/myelofibrosis     Platelet results are
Call placed to [facility], await call back
Contacted Dr. Abner Solo via [employer] [facility] for new admission orders.  Electronically signed by Debbie Kathleen RN on 6/19/2023 at 4:59 PM
Database initiated pharmacy and medications verified with the patient. He is A&O independent from home alone.  Follows with the [facility] with Dr Swetha Nunez
Gonzalez removed [id number] [facility] [id number]
Manually irrigated 100 mL of NS. Obtained many small clots. Patient tolerated irrigation well. Electronically signed by Silvestre Tolliver RN on 6/21/2023 at 12:15 pm      Manually irrigated 100 mL of NS. Obtained many small clots. Patient tolerated irrigation well. Electronically signed by Silvestre Tolliver RN on 6/21/2023 at 5:01 PM      Manually irrigated 100 mL of NS. Obtained a few small red flecks. Clotting seems to be improving. Pt. Tolerated well.  Electronically signed by Silvestre Tolliver RN on 6/21/2023 at 6:30 PM
Manually irrigated catheter - no clots-patient tolerated well.
Manually irrigated catheter -few medium clots removed. Patient tolerated well.
Manually irrigated catheter with 100cc NS. Many small clots removed. Patient tolerated well and denies pressure.      Electronically signed by Jessy Luna RN on 6/21/2023 at 9:34 AM
Manually irrigated catheter with 100cc NS. No small clots observed. Patient tolerated well and denies pressure. Manually irrigated 100 mL of NS. Obtained many moderate sized clots. Patient stated that he experienced some pressure during irrigation.
Manually irrigated catheter with 100cc of NS. Obtained small amount of clots.  Pt tolerated
Manually irrigated catheter with 100cc of NS. Obtained small amount of clots.  Pt tolerated
Manually irrigated catheter with 100cc of NS. Obtained small amount of clots. Pt tolerated irrigation and denies pressure at this time.      Electronically signed by Earlene Salmon RN on 6/20/2023 at 12:55 AM
Manually irrigated pt with 100cc NS per order. Obtained no clots at this time. Pt tolerated irrigation.   Electronically signed by Brenda Watson RN on 6/21/2023 at 2:17 AM
Manually irrigated pt with 100cc NS per order. Obtained one large clot. Pt tolerated irrigation and denies pressure at this time. Electronically signed by Aysha Vogt RN on 6/20/2023 at 8:38 PM    Manually irrigated pt with 100cc NS per order. Obtained no clots at this time. Pt tolerated irrigation and denies pressure at this time.  Electronically signed by Aysha Vogt RN on 6/20/2023 at 10:35 PM
Manually irrigated pt with 100cc NS per order. Removed several small clots. Pt tolerated irrigation and denies pressure at this time.     Electronically signed by Morgan Diallo RN on 6/21/2023 at 6:21 AM
Manually irrigated pt with 100cc NS per order. Several Small clots removed. Pt tolerated irrigation.
Manually irrigated pt with 100cc of NS. Obtained large amount of clots. Pt tolerated irrigation and denies pressure at this time. Electronically signed by Ledy Ronquillo RN on 6/19/2023 at 5:45 PM    Manually irrigated catheter with 100cc of NS. Obtained small amount of clots. Pt tolerated irrigation and denies pressure at this time.  Electronically signed by Ledy Ronquillo RN on 6/19/2023 at 8:00 PM
[facility]  [address] [place] [address] Jermaine Sheth [address], [address] [facility]  [phone]  FAX [phone]      CHIEF UROLOGIC COMPLAINT: Gross hematuria, thrombocytopenia, history of prostate cancer status post radiation, history of bladder cancer    HISTORY OF PRESENT ILLNESS:  Patient without new complaints. Comfortable. No clot retention. Still with hematuria. REVIEW OF SYSTEMS:   CONSTITUTIONAL: negative  HEENT: negative  HEMATOLOGIC: negative  ENDOCRINE: negative  RESPIRATORY: negative  CV: negative  GI: negative  NEURO: negative  ORTHOPEDICS: negative  PSYCHIATRIC: negative  : as above    PAST FAMILY HISTORY:  No family history on file.   PAST SOCIAL HISTORY:    Social History     Socioeconomic History    Marital status:    Tobacco Use    Smoking status: Never    Smokeless tobacco: Never   Substance and Sexual Activity    Alcohol use: No       Scheduled Meds:   amLODIPine  5 mg Oral Daily    morphine  6 mg IntraVENous Once    atorvastatin  20 mg Oral Daily    lisinopril  10 mg Oral Daily    Pacritinib Citrate  200 mg Oral BID     Continuous Infusions:   sodium chloride       PRN Meds:.sodium chloride, lidocaine    BP (!) 168/73   Pulse 65   Temp 98.5 °F (36.9 °C) (Oral)   Resp 20   Ht 5' 10\" (1.778 m)   Wt 163 lb (73.9 kg)   SpO2 96%   BMI 23.39 kg/m²     Lab Results   Component Value Date    WBC 3.4 (L) 06/20/2023    HGB 8.5 (L) 06/20/2023    HCT 25.8 (L) 06/20/2023    MCV 86.9 06/20/2023    PLT 31 (L) 06/20/2023       Lab Results   Component Value Date    CREATININE 1.1 06/20/2023       Lab Results   Component Value Date    PSA 0.04 06/20/2023       Lab Results   Component Value Date    LABURIN Growth not present, incubation continues 06/19/2023       Lab Results   Component Value Date    BC 5 Days- no growth 12/13/2019       Lab Results   Component Value Date    BLOODCULT2 5 Days- no growth 12/13/2019       PHYSICAL EXAMINATION:  Skin dry, without rashes  Respirations
Patient declined catheter irrigation.
Patient declined catheter irrigation.
Patient refusing labs- wants drawn in the AM. Understands reasoning and risks.   Electronically signed by Mumtaz Llamas RN on 6/23/2023 at 5:00 PM
Patient was of unit from [id number] for procedure  Electronically signed by Ximena Velazquez RN on 6/23/2023 at 5:25 PM
Phlebotomist entered room to draw blood work. Patient refused having blood work at this time unless he was going to his procedure today.   Electronically signed by Ana Cristina Jeter RN on 6/22/2023 at 3:57 AM
Report given to [facility]
SUBJECTIVE:  temp 99  Pt still with clots in bladder irrigated clear  Platelets [id number] seen by hematology   may need platlet an rbc transfusion  Pt hungry  Ct reviewed  pt has cysts but no obst        OBJECTIVE:    BP (!) 142/74   Pulse 71   Temp 99.4 °F (37.4 °C) (Oral)   Resp 17   Ht 5' 10\" (1.778 m)   Wt 163 lb (73.9 kg)   SpO2 95%   BMI 23.39 kg/m²     PHYSICAL EXAMINATION:  Skin: dry, without rashes  Respirations: non-labored, intact  Abdomen: soft, non-tender, non-distended      Lab Results   Component Value Date    WBC 3.4 (L) 06/20/2023    HGB 8.5 (L) 06/20/2023    HCT 25.8 (L) 06/20/2023    MCV 86.9 06/20/2023    PLT 31 (L) 06/20/2023       Lab Results   Component Value Date    CREATININE 1.1 06/20/2023       Lab Results   Component Value Date    PSA 0.04 06/20/2023       REVIEW OF SYSTEMS:    CONSTITUTIONAL: negative  HEENT: negative  HEMATOLOGIC: negative  ENDOCRINE: negative  RESPIRATORY: negative  CV: negative  GI: negative  NEURO: negative  ORTHOPEDICS: negative  PSYCHIATRIC: negative  : as above    PAST FAMILY HISTORY:  No family history on file.   PAST SOCIAL HISTORY:    Social History     Socioeconomic History    Marital status:    Tobacco Use    Smoking status: Never    Smokeless tobacco: Never   Substance and Sexual Activity    Alcohol use: No       Scheduled Meds:   amLODIPine  5 mg Oral Daily    morphine  6 mg IntraVENous Once    atorvastatin  20 mg Oral Daily    lisinopril  10 mg Oral Daily    Pacritinib Citrate  200 mg Oral BID     Continuous Infusions:  PRN Meds:.lidocaine    ASSESSMENT:      Patient Active Problem List   Diagnosis    Urinary retention       PLAN:  Pagan irrigates well   Will continue with pagan and piston syringe irrigation   Hopefully platlets can be help prior to any cysto and possible bt removal      Matthew Leslie MD, M.D.  6/20/2023  11:44 AM
Spoke with blood bank, confirmed two bags of platelets are at [facility].     Electronically signed by Brittany Boston RN on 6/23/2023 at 4:13 AM
Spoke with blood bank, states that due to pt's blood type, the platelets will [facility]. They will call when it arrives.  Electronically signed by Rafa Yarbrough RN on 6/20/2023 at 8:34 PM
Subjective:  Hemoglobin 7.3 this a.m. Platelets 15,103 from 47 posttransfusion last evening  Patient without complaints  Discussed with urology and plans for cystoscopy and possible biopsy tomorrow morning  Patient with continued indwelling Pagan catheter and hematuria with clot evacuation on flushing  The patient is awake and alert. No problems overnight  Tolerating diet. Denies chest pain, angina, dyspnea, abdominal discomfort, nausea/vomiting and diarrhea/constipation. Objective:    BP (!) 159/69   Pulse 79   Temp 98.2 °F (36.8 °C) (Oral)   Resp 16   Ht 5' 10\" (1.778 m)   Wt 162 lb (73.5 kg)   SpO2 93%   BMI 23.24 kg/m²     General: NAD  HEENT: normocephalic/atraumatic, mucosa dry without ulcerations,thrush or mucositis, EOMI, PERRLA, sclera anicteric, conjuntiva pink  NECK: supple, trachea midline, JVD+  Heart:  IRRR, no murmurs, gallops, or rubs.   Lungs:  CTA bilaterally, no wheeze, rales or rhonchi  Abd: BS present, nontender, nondistended, no masses  Extrem:  No clubbing, cyanosis, or edema  : pagan, red urine  Skin: Intact, no petechia or purpura    CBC with Differential:    Lab Results   Component Value Date/Time    WBC 3.2 06/21/2023 07:45 PM    RBC 2.56 06/21/2023 07:45 PM    HGB 7.4 06/21/2023 07:45 PM    HCT 22.5 06/21/2023 07:45 PM    PLT 47 06/21/2023 07:45 PM    MCV 87.9 06/21/2023 07:45 PM    MCH 28.9 06/21/2023 07:45 PM    MCHC 32.9 06/21/2023 07:45 PM    RDW 17.9 06/21/2023 07:45 PM    NRBC 18.9 06/20/2023 07:00 AM    BLASTSPCT 2.7 06/20/2023 07:00 AM    METASPCT 0.9 06/20/2023 07:00 AM    LYMPHOPCT 47.8 06/20/2023 07:00 AM    MONOPCT 4.5 06/20/2023 07:00 AM    MYELOPCT 3.6 06/20/2023 07:00 AM    BASOPCT 0.9 06/20/2023 07:00 AM    MONOSABS 0.17 06/20/2023 07:00 AM    LYMPHSABS 1.63 06/20/2023 07:00 AM    EOSABS 0.00 06/20/2023 07:00 AM    BASOSABS 0.03 06/20/2023 07:00 AM     CMP:    Lab Results   Component Value Date/Time     06/21/2023 06:00 AM    K 4.2 06/21/2023
Subjective:  No overnight events  Patient in OR for cystoscopy    Objective:    BP (!) 170/72   Pulse 75   Temp 98 °F (36.7 °C) (Oral)   Resp 17   Ht 5' 10\" (1.778 m)   Wt 154 lb (69.9 kg)   SpO2 94%   BMI 22.10 kg/m²       CBC with Differential:    Lab Results   Component Value Date/Time    WBC 2.6 06/23/2023 03:05 AM    RBC 2.68 06/23/2023 03:05 AM    HGB 7.7 06/23/2023 03:05 AM    HCT 22.7 06/23/2023 03:05 AM    PLT 50 06/23/2023 03:05 AM    MCV 84.7 06/23/2023 03:05 AM    MCH 28.7 06/23/2023 03:05 AM    MCHC 33.9 06/23/2023 03:05 AM    RDW 16.9 06/23/2023 03:05 AM    NRBC 6.0 06/23/2023 03:05 AM    BLASTSPCT 4.3 06/22/2023 08:45 AM    METASPCT 3.0 06/23/2023 03:05 AM    LYMPHOPCT 51.0 06/23/2023 03:05 AM    MONOPCT 3.0 06/23/2023 03:05 AM    MYELOPCT 4.0 06/23/2023 03:05 AM    BASOPCT 0.0 06/23/2023 03:05 AM    MONOSABS 0.08 06/23/2023 03:05 AM    LYMPHSABS 1.33 06/23/2023 03:05 AM    EOSABS 0.00 06/23/2023 03:05 AM    BASOSABS 0.00 06/23/2023 03:05 AM     CMP:    Lab Results   Component Value Date/Time     06/22/2023 08:45 AM    K 4.3 06/22/2023 08:45 AM     06/22/2023 08:45 AM    CO2 26 06/22/2023 08:45 AM    BUN 23 06/22/2023 08:45 AM    CREATININE 1.2 06/22/2023 08:45 AM    LABGLOM >60 06/22/2023 08:45 AM    GLUCOSE 104 06/22/2023 08:45 AM    CALCIUM 8.9 06/22/2023 08:45 AM          Current Facility-Administered Medications:     0.9 % sodium chloride infusion, , IntraVENous, PRN, ANDREW Ramos    0.9 % sodium chloride infusion, , IntraVENous, PRN, ANDREW Ramos    amLODIPine (NORVASC) tablet 5 mg, 5 mg, Oral, Daily, Samson Hernandez MD, 5 mg at 06/23/23 0908    0.9 % sodium chloride infusion, , IntraVENous, PRN, ANDREW Ramos    lidocaine (XYLOCAINE) 2 % uro-jet, , Topical, PRN, Meagan Kate, APRN - CNP    morphine sulfate (PF) injection 6 mg, 6 mg, IntraVENous, Once, Yodio, DO    atorvastatin (LIPITOR) tablet 20 mg, 20 mg, Oral, Daily, Samson Hernandez MD, 20 mg
Subjective:  Platelet count improved to 41k after a unit last night  Urology with plans to complete a cystoscopy during this admission  Patient with continued indwelling Pagan catheter and hematuria with clot evacuation on flushing  The patient is awake and alert. No problems overnight  Tolerating diet. Denies chest pain, angina, dyspnea, abdominal discomfort, nausea/vomiting and diarrhea/constipation. Objective:    BP (!) 149/68   Pulse 89   Temp 98.6 °F (37 °C) (Oral)   Resp 16   Ht 5' 10\" (1.778 m)   Wt 163 lb (73.9 kg)   SpO2 96%   BMI 23.39 kg/m²     General: NAD  HEENT: normocephalic/atraumatic, mucosa dry without ulcerations,thrush or mucositis, EOMI, PERRLA, sclera anicteric, conjuntiva pink  NECK: supple, trachea midline  Heart:  IRRR, no murmurs, gallops, or rubs.   Lungs:  CTA bilaterally, no wheeze, rales or rhonchi  Abd: BS present, nontender, nondistended, no masses  Extrem:  No clubbing, cyanosis, or edema  : pagan, red urine  Skin: Intact, no petechia or purpura    CBC with Differential:    Lab Results   Component Value Date/Time    WBC 3.7 06/21/2023 06:00 AM    RBC 2.80 06/21/2023 06:00 AM    HGB 7.9 06/21/2023 06:00 AM    HCT 24.4 06/21/2023 06:00 AM    PLT 41 06/21/2023 06:00 AM    MCV 87.1 06/21/2023 06:00 AM    MCH 28.2 06/21/2023 06:00 AM    MCHC 32.4 06/21/2023 06:00 AM    RDW 17.7 06/21/2023 06:00 AM    NRBC 18.9 06/20/2023 07:00 AM    BLASTSPCT 2.7 06/20/2023 07:00 AM    METASPCT 0.9 06/20/2023 07:00 AM    LYMPHOPCT 47.8 06/20/2023 07:00 AM    MONOPCT 4.5 06/20/2023 07:00 AM    MYELOPCT 3.6 06/20/2023 07:00 AM    BASOPCT 0.9 06/20/2023 07:00 AM    MONOSABS 0.17 06/20/2023 07:00 AM    LYMPHSABS 1.63 06/20/2023 07:00 AM    EOSABS 0.00 06/20/2023 07:00 AM    BASOSABS 0.03 06/20/2023 07:00 AM     CMP:    Lab Results   Component Value Date/Time     06/21/2023 06:00 AM    K 4.2 06/21/2023 06:00 AM     06/21/2023 06:00 AM    CO2 23 06/21/2023 06:00 AM    BUN 28
Subjective: The patient is awake and alert. No problems overnight. Denies chest pain, angina, and dyspnea. Denies abdominal pain. Tolerating diet. No nausea or vomiting. BP improved some with addition of amlodipine. Received platelet transfusion last night. Still with gross hematuria. Objective:    BP (!) 168/73   Pulse 65   Temp 98.5 °F (36.9 °C) (Oral)   Resp 20   Ht 5' 10\" (1.778 m)   Wt 163 lb (73.9 kg)   SpO2 96%   BMI 23.39 kg/m²   Neck: No goiter, bruit, or LA  Heart:  RRR, no murmurs, gallops, or rubs.   Lungs:  CTA bilaterally, no wheeze, rales or rhonchi  Abd: bowel sounds present, nontender, nondistended, no masses  Extrem:  No clubbing, cyanosis, or edema, 2+ peripheral pulses, FROM    CBC:   Lab Results   Component Value Date/Time    WBC 3.4 06/20/2023 07:00 AM    RBC 2.97 06/20/2023 07:00 AM    HGB 8.5 06/20/2023 07:00 AM    HCT 25.8 06/20/2023 07:00 AM    MCV 86.9 06/20/2023 07:00 AM    MCH 28.6 06/20/2023 07:00 AM    MCHC 32.9 06/20/2023 07:00 AM    RDW 18.2 06/20/2023 07:00 AM    PLT 31 06/20/2023 07:00 AM    MPV NOT CALC 06/20/2023 07:00 AM     CMP:    Lab Results   Component Value Date/Time     06/20/2023 07:00 AM    K 4.2 06/20/2023 07:00 AM     06/20/2023 07:00 AM    CO2 25 06/20/2023 07:00 AM    BUN 21 06/20/2023 07:00 AM    CREATININE 1.1 06/20/2023 07:00 AM    LABGLOM >60 06/20/2023 07:00 AM    GLUCOSE 102 06/20/2023 07:00 AM    CALCIUM 8.8 06/20/2023 07:00 AM          Current Facility-Administered Medications:     amLODIPine (NORVASC) tablet 5 mg, 5 mg, Oral, Daily, Jaelyn Francis MD, 5 mg at 06/20/23 0816    0.9 % sodium chloride infusion, , IntraVENous, PRN, ANDREW Warren    lidocaine (XYLOCAINE) 2 % uro-jet, , Topical, PRN, Meagan Kate APRN - CNP    morphine sulfate (PF) injection 6 mg, 6 mg, IntraVENous, Once, [employer], DO    atorvastatin (LIPITOR) tablet 20 mg, 20 mg, Oral, Daily, Jaelyn Francis MD, 20 mg at 06/20/23 0816    lisinopril
Subjective: The patient is awake and alert. No problems overnight. Denies chest pain, angina, and dyspnea. Denies abdominal pain. Tolerating diet. No nausea or vomiting. Gross hematuria persists. For cystoscopy today. Had bladder ultrasound yesterday- no results avaialable at this time. Objective:    BP (!) 148/66   Pulse 74   Temp 98.6 °F (37 °C) (Oral)   Resp 16   Ht 5' 10\" (1.778 m)   Wt 154 lb (69.9 kg)   SpO2 95%   BMI 22.10 kg/m²   Neck: No goiter, bruit, or LA  Heart:  RRR, no murmurs, gallops, or rubs.   Lungs:  CTA bilaterally, no wheeze, rales or rhonchi  Abd: bowel sounds present, nontender, nondistended, no masses  Extrem:  No clubbing, cyanosis, or edema, 2+ peripheral pulses, FROM    CBC:   Lab Results   Component Value Date/Time    WBC 2.6 06/23/2023 03:05 AM    RBC 2.68 06/23/2023 03:05 AM    HGB 7.7 06/23/2023 03:05 AM    HCT 22.7 06/23/2023 03:05 AM    MCV 84.7 06/23/2023 03:05 AM    MCH 28.7 06/23/2023 03:05 AM    MCHC 33.9 06/23/2023 03:05 AM    RDW 16.9 06/23/2023 03:05 AM    PLT 50 06/23/2023 03:05 AM    MPV NOT CALC 06/23/2023 03:05 AM     CMP:    Lab Results   Component Value Date/Time     06/22/2023 08:45 AM    K 4.3 06/22/2023 08:45 AM     06/22/2023 08:45 AM    CO2 26 06/22/2023 08:45 AM    BUN 23 06/22/2023 08:45 AM    CREATININE 1.2 06/22/2023 08:45 AM    LABGLOM >60 06/22/2023 08:45 AM    GLUCOSE 104 06/22/2023 08:45 AM    CALCIUM 8.9 06/22/2023 08:45 AM     BMP:    Lab Results   Component Value Date/Time     06/22/2023 08:45 AM    K 4.3 06/22/2023 08:45 AM     06/22/2023 08:45 AM    CO2 26 06/22/2023 08:45 AM    BUN 23 06/22/2023 08:45 AM    CREATININE 1.2 06/22/2023 08:45 AM    CALCIUM 8.9 06/22/2023 08:45 AM    LABGLOM >60 06/22/2023 08:45 AM    GLUCOSE 104 06/22/2023 08:45 AM          Current Facility-Administered Medications:     0.9 % sodium chloride infusion, , IntraVENous, PRN, ANDREW Becerril    0.9 % sodium chloride infusion, ,
Subjective: The patient is awake and alert. No problems overnight. Denies chest pain, angina, and dyspnea. Denies abdominal pain. Tolerating diet. No nausea or vomiting. S/p cystoscopy which showed small bladder mass that was removed and a visible vessel that was cauterized. His urine is clear. Objective:    BP (!) 162/70   Pulse 86   Temp 98.7 °F (37.1 °C) (Oral)   Resp 18   Ht 5' 10\" (1.778 m)   Wt 154 lb (69.9 kg)   SpO2 97%   BMI 22.10 kg/m²   Neck: No goiter, bruit, or LA  Heart:  Irregular, no murmurs, gallops, or rubs.   Lungs:  CTA bilaterally, no wheeze, rales or rhonchi  Abd: bowel sounds present, nontender, nondistended, no masses  Extrem:  No clubbing, cyanosis, or edema, 2+ peripheral pulses, FROM    CBC:   Lab Results   Component Value Date/Time    WBC 2.3 06/24/2023 05:25 AM    RBC 2.58 06/24/2023 05:25 AM    HGB 7.5 06/24/2023 05:25 AM    HCT 22.0 06/24/2023 05:25 AM    MCV 85.3 06/24/2023 05:25 AM    MCH 29.1 06/24/2023 05:25 AM    MCHC 34.1 06/24/2023 05:25 AM    RDW 17.0 06/24/2023 05:25 AM    PLT 45 06/24/2023 05:25 AM    MPV NOT CALC 06/24/2023 05:25 AM     CMP:    Lab Results   Component Value Date/Time     06/22/2023 08:45 AM    K 4.3 06/22/2023 08:45 AM     06/22/2023 08:45 AM    CO2 26 06/22/2023 08:45 AM    BUN 23 06/22/2023 08:45 AM    CREATININE 1.2 06/22/2023 08:45 AM    LABGLOM >60 06/22/2023 08:45 AM    GLUCOSE 104 06/22/2023 08:45 AM    CALCIUM 8.9 06/22/2023 08:45 AM          Current Facility-Administered Medications:     0.9 % sodium chloride infusion, , IntraVENous, PRN, ANDREW Jerez    amLODIPine (NORVASC) tablet 5 mg, 5 mg, Oral, Daily, Nicholas Dunlap MD, 5 mg at 06/24/23 0746    0.9 % sodium chloride infusion, , IntraVENous, PRN, ANDREW Jerez    lidocaine (XYLOCAINE) 2 % uro-jet, , Topical, PRN, Meagan Kate APRN - CNP    morphine sulfate (PF) injection 6 mg, 6 mg, IntraVENous, Once, [employer], DO    atorvastatin (LIPITOR)
[facility]  (Michael/ Emeka/Mary)      PROGRESS NOTE         PATIENT NAME: Richardson Paez   YOB: 1940  ADMISSION DATE: 6/19/2023  8:09 AM   TODAY'S DATE: 6/23/2023        Subjective       Doing better. Urine light pink. Platelets over 50   Getting Platelets in pre op  Discussed with patient and sons. Objective     VS:   BP (!) 154/77   Pulse 81   Temp 97.5 °F (36.4 °C)   Resp 15   Ht 5' 10\" (1.778 m)   Wt 154 lb (69.9 kg)   SpO2 93%   BMI 22.10 kg/m²     I & O - 24hr:    Intake/Output Summary (Last 24 hours) at 6/23/2023 1130  Last data filed at 6/23/2023 0900  Gross per 24 hour   Intake 922.67 ml   Output 1800 ml   Net -877.33 ml         Physical Exam:  General:  Neck:  Resp:  Abdomen:   No acute distress  Supple  Normal effort  Soft, non-tender, nondistended   :  Gonzalez light pink   Skin: Skin color, texture, turgor normal, no rashes or lesions       Labs and Imaging Studies   Labs:    CBC:   Recent Labs     06/21/23  1945 06/22/23  0845 06/22/23  1850 06/23/23  0305   WBC 3.2* 2.7*  --  2.6*   HGB 7.4* 7.3* 8.3* 7.7*   HCT 22.5* 21.9* 25.0* 22.7*   MCV 87.9 87.6  --  84.7   PLT 47* 43*  --  50*     BMP:  Recent Labs     06/21/23  0600 06/22/23  0845    141   K 4.2 4.3    107   CO2 23 26   BUN 28* 23   CREATININE 1.3* 1.2       Magnesium: No results found for: MG   Phosphate: No results found for: PHOS  PT/INR: No results for input(s): PROTIME, INR in the last 72 hours.     U/A:   Lab Results   Component Value Date/Time    LEUKOCYTESUR MODERATE 06/19/2023 08:48 AM    PHUR 6.5 06/19/2023 08:48 AM    WBCUA 2-5 06/19/2023 08:48 AM    RBCUA PACKED 06/19/2023 08:48 AM    BACTERIA NONE SEEN 06/19/2023 08:48 AM    SPECGRAV 1.020 06/19/2023 08:48 AM    BLOODU LARGE 06/19/2023 08:48 AM    GLUCOSEU Negative 06/19/2023 08:48 AM       Urine Culture:       Component Value Date/Time    LABURIN Growth not present 06/19/2023 0848        Blood Culture:     Imaging Studies:
[facility]  (Michael/ Emeka/Mary)      PROGRESS NOTE         PATIENT NAME: Salome Ball   YOB: 1940  ADMISSION DATE: 6/19/2023  8:09 AM   TODAY'S DATE: 6/24/2023        Subjective       Urine yellow. No issues. Objective     VS:   BP (!) 162/70   Pulse 86   Temp 98.7 °F (37.1 °C) (Oral)   Resp 18   Ht 5' 10\" (1.778 m)   Wt 154 lb (69.9 kg)   SpO2 97%   BMI 22.10 kg/m²     I & O - 24hr:    Intake/Output Summary (Last 24 hours) at 6/24/2023 0925  Last data filed at 6/24/2023 0531  Gross per 24 hour   Intake 1260 ml   Output 1750 ml   Net -490 ml         Physical Exam:  General:  Neck:  Resp:  Abdomen:   No acute distress  Supple  Normal effort  Soft, non-tender, nondistended   :  defer   Skin: Skin color, texture, turgor normal, no rashes or lesions       Labs and Imaging Studies   Labs:    CBC:   Recent Labs     06/22/23  0845 06/22/23  1850 06/23/23  0305 06/24/23  0525   WBC 2.7*  --  2.6* 2.3*   HGB 7.3* 8.3* 7.7* 7.5*   HCT 21.9* 25.0* 22.7* 22.0*   MCV 87.6  --  84.7 85.3   PLT 43*  --  50* 45*     BMP:  Recent Labs     06/22/23  0845      K 4.3      CO2 26   BUN 23   CREATININE 1.2       Magnesium: No results found for: MG   Phosphate: No results found for: PHOS  PT/INR: No results for input(s): PROTIME, INR in the last 72 hours.     U/A:   Lab Results   Component Value Date/Time    LEUKOCYTESUR MODERATE 06/19/2023 08:48 AM    PHUR 6.5 06/19/2023 08:48 AM    WBCUA 2-5 06/19/2023 08:48 AM    RBCUA PACKED 06/19/2023 08:48 AM    BACTERIA NONE SEEN 06/19/2023 08:48 AM    SPECGRAV 1.020 06/19/2023 08:48 AM    BLOODU LARGE 06/19/2023 08:48 AM    GLUCOSEU Negative 06/19/2023 08:48 AM       Urine Culture:       Component Value Date/Time    LABURIN Growth not present 06/19/2023 0848        Blood Culture:     Imaging Studies:          Assessment and Plan       Assessment/Plan:  Hx of prostate cancer s/p radical prostatectomy in 2001 followed by radiation therapy   Hx of
Tiera Montero was ordered Vonjo (pacritinib) tablet which is a nonformulary medication. This medication is not stocked by the pharmacy so it will need to be brought in from home for inpatient use. If the medication has not been administered by 1400 on the following day from the time the order was placed, a pharmacist will follow-up with the nurse of the patient to assess the capability of the patient to bring in the medication.     Thank you
amLODIPine (NORVASC) tablet 5 mg, 5 mg, Oral, Daily, Araceli Harding MD, 5 mg at 06/24/23 0746    0.9 % sodium chloride infusion, , IntraVENous, PRN, ANDREW Wild    lidocaine (XYLOCAINE) 2 % uro-jet, , Topical, PRN, Meagan Kate APRN - CNP    morphine sulfate (PF) injection 6 mg, 6 mg, IntraVENous, Once, ForeSee, DO    atorvastatin (LIPITOR) tablet 20 mg, 20 mg, Oral, Daily, Araceli Harding MD, 20 mg at 06/24/23 0746    lisinopril (PRINIVIL;ZESTRIL) tablet 10 mg, 10 mg, Oral, Daily, Araceli Harding MD, 10 mg at 06/24/23 0746    [Held by provider] Pacritinib Citrate (VONJO) CAPS 100 mg/CAPSULE (Patient Supplied), 200 mg, Oral, BID, Araceli Harding MD, 200 mg at 06/21/23 0751    FL RETROGRADE PYELOGRAM W WO KUB   Final Result   Intraprocedural fluoroscopic spot images as above. See separate procedure   report for more information. CT ABDOMEN PELVIS W IV CONTRAST Additional Contrast? None   Final Result   1. No evidence of bowel obstruction. 2.  Moderate to severe splenomegaly      3. Multiple right renal cortical cysts with a perirenal irregularly   marginated fluid density lesion, etiology uncertain. Consider ruptured renal   cortical cyst.      4.  Postop changes of previous left inguinal hernia repair, prior   prostatectomy. Gonzalez catheter is noted in the urinary bladder. 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 He began frontline treatment with Pacritinib 200 mg p.o. twice daily  February 7, 2023. Patient is not a candidate for transplant. Still require transfusion. Patient seen by his primary oncologist Dr. Maritza Mccall 5/8. 1.MPN/myelofibrosis  2. Hypertension. 3. Hyperlipidemia. 4. CAD. 5. Carotid artery disease status post left carotid endarterectomy.   6. Prostate
mg, 20 mg, Oral, Daily, Jaelyn Francis MD, 20 mg at 06/21/23 0750    lisinopril (PRINIVIL;ZESTRIL) tablet 10 mg, 10 mg, Oral, Daily, Jaelyn Francis MD, 10 mg at 06/21/23 0751    [Held by provider] Pacritinib Citrate (VONJO) CAPS 100 mg/CAPSULE (Patient Supplied), 200 mg, Oral, BID, Jaelyn Francis MD, 200 mg at 06/21/23 0751    Assessment:    Patient Active Problem List   Diagnosis    Urinary retention       Plan:  Gross hematuria- etiology to be determined, for cystoscopy at some pt, urology following  Acute blood loss anemia- this is occurring on a baseline anemia from his myelofibrosis, Transfuse if <7  Thrombocytopenia- due to myelofibrosis, receiving platelet transfusions.  Hematology on case  HTN- cont current treatment, amlodipine added this admission  CKD3a- baseline creatinine around 1.2, follow        Jaelyn Francis MD  6:55 AM  6/22/2023
Fatigue  9. Stage III CKD    A/P:  Transfuse a unit of platelets to attempt to control hematuria. Patient with blasts noted on differential, potentially secondary to acute blood loss but Check iron studies and B12/folate  -urinary retention, [facility] following  -JAK2 positive myelofibrosis on Pacritinib 200 mg p.o. twice daily   -anemia multifactorial, CKD, myelofibrosis, blood loss on aranep outpatient  -transfuse supportively to keep Hgb>7  -thrombocytopenia, due to myelofibrosis, with active  bleeding, keep platlets > 30k  -leukopenia, blasts on differential uncertain if due to blood loss or disease progression, check reticulocyte count and follow  -hold DVT prophylaxis with active bleeding and plt <50k    Thank you for allowing us to participate in the care of [address]. Davonte Spivey PA-C  [phone]    Electronically signed by ANDREW Ridley on 6/20/2023 at 9:03 AM    Note: This report was completed using [employer] voiced recognition software. Every effort has been made to ensure accuracy; however, inadvertent computerized transcription errors may be present. Addendum: Patient seen, examined and discussed with PA and agree with above assessment and plan.     Kinsey Tavarez MD

## 2023-06-24 NOTE — PLAN OF CARE
Problem: Discharge Planning  Goal: Discharge to home or other facility with appropriate resources  Outcome: Progressing     Problem: Pain  Goal: Verbalizes/displays adequate comfort level or baseline comfort level  6/24/2023 0002 by Esa Elias  Outcome: Progressing  6/23/2023 1100 by Kelly Andrade RN  Outcome: Progressing     Problem: Safety - Adult  Goal: Free from fall injury  Outcome: Progressing

## 2023-06-26 LAB
EKG ATRIAL RATE: 91 BPM
EKG P AXIS: 20 DEGREES
EKG P-R INTERVAL: 136 MS
EKG Q-T INTERVAL: 388 MS
EKG QRS DURATION: 82 MS
EKG QTC CALCULATION (BAZETT): 477 MS
EKG R AXIS: -6 DEGREES
EKG T AXIS: 27 DEGREES
EKG VENTRICULAR RATE: 91 BPM

## 2023-06-26 PROCEDURE — 93010 ELECTROCARDIOGRAM REPORT: CPT | Performed by: INTERNAL MEDICINE

## 2023-08-02 ENCOUNTER — PROCEDURE VISIT (OUTPATIENT)
Dept: PODIATRY | Age: 83
End: 2023-08-02
Payer: COMMERCIAL

## 2023-08-02 VITALS — TEMPERATURE: 97.3 F | WEIGHT: 154 LBS | BODY MASS INDEX: 22.1 KG/M2

## 2023-08-02 DIAGNOSIS — I73.9 PERIPHERAL VASCULAR DISEASE, UNSPECIFIED (HCC): ICD-10-CM

## 2023-08-02 DIAGNOSIS — B35.1 TINEA UNGUIUM: Primary | ICD-10-CM

## 2023-08-02 DIAGNOSIS — M79.674 PAIN IN TOE OF RIGHT FOOT: ICD-10-CM

## 2023-08-02 DIAGNOSIS — M79.675 PAIN IN LEFT TOE(S): ICD-10-CM

## 2023-08-02 PROCEDURE — 11721 DEBRIDE NAIL 6 OR MORE: CPT | Performed by: PODIATRIST

## 2023-08-02 NOTE — PROGRESS NOTES
Iwona Milly  Return Patient    Chief Complaint   Patient presents with    Olga Luciano MD  7/5/2023       Subjective: This John Boss comes to office for foot and nail care. Pt currently has complaint of thickened, painful, elongated nails that he/she cannot manage by themselves. Pt. Relates pain to nails with shoe gear. Pt's primary care physician is Yonatan Lin MD.  Past Medical History:   Diagnosis Date    Hyperlipidemia     Hypertension        No Known Allergies  Current Outpatient Medications on File Prior to Visit   Medication Sig Dispense Refill    lisinopril (PRINIVIL;ZESTRIL) 10 MG tablet Take 2 tablets by mouth daily 30 tablet 3    amLODIPine (NORVASC) 5 MG tablet Take 1 tablet by mouth daily 30 tablet 5    VONJO 100 MG CAPS Take 100 mg by mouth in the morning and at bedtime PT TAKES AT 0800 AND 2000      atorvastatin (LIPITOR) 20 MG tablet Take 1 tablet by mouth daily      Icosapent Ethyl (VASCEPA) 1 g CAPS capsule Take 2 capsules by mouth      Cholecalciferol 50 MCG (2000 UT) CAPS Take 1 capsule by mouth daily      magnesium (MAGNESIUM-OXIDE) 250 MG TABS tablet Take 1 tablet by mouth daily      coenzyme Q10 200 MG CAPS capsule Take 1 capsule by mouth daily      aspirin 81 MG tablet Take 1 tablet by mouth daily       No current facility-administered medications on file prior to visit. Review of Systems  Objective:  General: AAO x 3 in NAD.     Derm  Toenail Description  Sites of Onychomycosis Involvement (Check affected area)  [x] [x] [x] [x] [x] [x] [x] [x] [x] [x]  5 4 3 2 1 1 2 3 4 5                          Right                                        Left    Thickness  [x] [x] [x] [x] [x] [x] [x] [x] [x] [x]  5 4 3 2 1 1 2 3 4 5                         Right                                        Left    Dystrophic Changes   [x] [x] [x] [x] [x] [x] [x] [x] [x] [x]  5 4 3 2 1 1 2 3 4 5                         Right

## 2023-10-06 ENCOUNTER — OFFICE VISIT (OUTPATIENT)
Dept: FAMILY MEDICINE CLINIC | Age: 83
End: 2023-10-06
Payer: COMMERCIAL

## 2023-10-06 VITALS
SYSTOLIC BLOOD PRESSURE: 152 MMHG | DIASTOLIC BLOOD PRESSURE: 74 MMHG | WEIGHT: 154 LBS | OXYGEN SATURATION: 94 % | HEART RATE: 92 BPM | BODY MASS INDEX: 22.1 KG/M2 | TEMPERATURE: 97.4 F

## 2023-10-06 DIAGNOSIS — M79.89 LEFT LEG SWELLING: ICD-10-CM

## 2023-10-06 DIAGNOSIS — R17 ELEVATED BILIRUBIN: ICD-10-CM

## 2023-10-06 DIAGNOSIS — M79.672 LEFT FOOT PAIN: ICD-10-CM

## 2023-10-06 DIAGNOSIS — R89.9 ABNORMAL LABORATORY TEST RESULT: ICD-10-CM

## 2023-10-06 DIAGNOSIS — E86.0 DEHYDRATION: ICD-10-CM

## 2023-10-06 DIAGNOSIS — D61.818 PANCYTOPENIA (HCC): ICD-10-CM

## 2023-10-06 DIAGNOSIS — M79.672 LEFT FOOT PAIN: Primary | ICD-10-CM

## 2023-10-06 DIAGNOSIS — E79.0 ELEVATED URIC ACID IN BLOOD: ICD-10-CM

## 2023-10-06 LAB
ALBUMIN SERPL-MCNC: 4.1 G/DL (ref 3.5–5.2)
ALP BLD-CCNC: 75 U/L (ref 40–129)
ALT SERPL-CCNC: 13 U/L (ref 0–40)
ANION GAP SERPL CALCULATED.3IONS-SCNC: 18 MMOL/L (ref 7–16)
AST SERPL-CCNC: 27 U/L (ref 0–39)
BASOPHILS ABSOLUTE: 0 K/UL (ref 0–0.2)
BASOPHILS RELATIVE PERCENT: 0 % (ref 0–2)
BILIRUB SERPL-MCNC: 1.6 MG/DL (ref 0–1.2)
BUN BLDV-MCNC: 26 MG/DL (ref 6–23)
CALCIUM SERPL-MCNC: 9.2 MG/DL (ref 8.6–10.2)
CHLORIDE BLD-SCNC: 106 MMOL/L (ref 98–107)
CO2: 20 MMOL/L (ref 22–29)
CREAT SERPL-MCNC: 1.2 MG/DL (ref 0.7–1.2)
EOSINOPHILS ABSOLUTE: 0 K/UL (ref 0.05–0.5)
EOSINOPHILS RELATIVE PERCENT: 0 % (ref 0–6)
GFR SERPL CREATININE-BSD FRML MDRD: >60 ML/MIN/1.73M2
GLUCOSE BLD-MCNC: 96 MG/DL (ref 74–99)
HCT VFR BLD CALC: 31.6 % (ref 37–54)
HEMOGLOBIN: 10 G/DL (ref 12.5–16.5)
LYMPHOCYTES ABSOLUTE: 1.75 K/UL (ref 1.5–4)
LYMPHOCYTES RELATIVE PERCENT: 39 % (ref 20–42)
MCH RBC QN AUTO: 29.4 PG (ref 26–35)
MCHC RBC AUTO-ENTMCNC: 31.6 G/DL (ref 32–34.5)
MCV RBC AUTO: 92.9 FL (ref 80–99.9)
METAMYELOCYTES ABSOLUTE COUNT: 0.12 K/UL (ref 0–0.12)
METAMYELOCYTES: 3 % (ref 0–1)
MONOCYTES ABSOLUTE: 0.24 K/UL (ref 0.1–0.95)
MONOCYTES RELATIVE PERCENT: 5 % (ref 2–12)
MYELOCYTES ABSOLUTE COUNT: 0.16 K/UL
MYELOCYTES: 4 %
NEUTROPHILS ABSOLUTE: 2.23 K/UL (ref 1.8–7.3)
NEUTROPHILS RELATIVE PERCENT: 50 % (ref 43–80)
NUCLEATED RED BLOOD CELLS: 17 PER 100 WBC
PDW BLD-RTO: 19.2 % (ref 11.5–15)
PLATELET CONFIRMATION: NORMAL
PLATELET, FLUORESCENCE: 41 K/UL (ref 130–450)
PMV BLD AUTO: ABNORMAL FL (ref 7–12)
POTASSIUM SERPL-SCNC: 4.3 MMOL/L (ref 3.5–5)
RBC # BLD: 3.4 M/UL (ref 3.8–5.8)
RBC # BLD: ABNORMAL 10*6/UL
SODIUM BLD-SCNC: 144 MMOL/L (ref 132–146)
TOTAL PROTEIN: 6.7 G/DL (ref 6.4–8.3)
URIC ACID: 9.7 MG/DL (ref 3.4–7)
WBC # BLD: 4.5 K/UL (ref 4.5–11.5)

## 2023-10-06 PROCEDURE — 99205 OFFICE O/P NEW HI 60 MIN: CPT | Performed by: PHYSICIAN ASSISTANT

## 2023-10-06 PROCEDURE — 1123F ACP DISCUSS/DSCN MKR DOCD: CPT | Performed by: PHYSICIAN ASSISTANT

## 2023-10-06 RX ORDER — CEPHALEXIN 500 MG/1
500 CAPSULE ORAL 3 TIMES DAILY
Qty: 30 CAPSULE | Refills: 0 | Status: SHIPPED | OUTPATIENT
Start: 2023-10-06 | End: 2023-10-16

## 2023-10-06 RX ORDER — METHYLPREDNISOLONE 4 MG/1
TABLET ORAL
Qty: 1 KIT | Refills: 0 | Status: SHIPPED | OUTPATIENT
Start: 2023-10-06

## 2023-10-06 NOTE — PROGRESS NOTES
10/6/23  Darin Nolan : 1940 Sex: male  Age 82 y.o. Subjective:  Chief Complaint   Patient presents with    Foot Swelling     Pain and swelling in left foot starting 10/4/23         HPI:   HPI  Darin Nolan , 82 y.o. male presents to express care for evaluation of left foot pain, swelling, redness. The patient has noted the symptoms ongoing for the last 3 days. The patient has noted some redness, warmth noted to the dorsum of the foot. The patient does have some swelling of the left leg. The patient does not have any history of gout. The patient states that he was sitting in the recliner and then started noticing some of the pain and discomfort into the foot. He does not recall any specific mechanism of injury. The patient is not a diabetic. The patient does not have any red streaking. The patient is not any fevers, chills. The patient is not having any lightheadedness, dizziness. Not currently on any antibiotics. ROS:   Unless otherwise stated in this report the patient's positive and negative responses for review of systems for constitutional, eyes, ENT, cardiovascular, respiratory, gastrointestinal, neurological, , musculoskeletal, and integument systems and related systems to the presenting problem are either stated in the history of present illness or were not pertinent or were negative for the symptoms and/or complaints related to the presenting medical problem. Positives and pertinent negatives as per HPI. All others reviewed and are negative. 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 No pertinent family history.     Medications:     Current

## 2023-11-07 ENCOUNTER — PROCEDURE VISIT (OUTPATIENT)
Dept: PODIATRY | Age: 83
End: 2023-11-07
Payer: COMMERCIAL

## 2023-11-07 VITALS
WEIGHT: 154 LBS | DIASTOLIC BLOOD PRESSURE: 64 MMHG | SYSTOLIC BLOOD PRESSURE: 122 MMHG | TEMPERATURE: 97.5 F | BODY MASS INDEX: 22.1 KG/M2

## 2023-11-07 DIAGNOSIS — M79.675 PAIN IN LEFT TOE(S): ICD-10-CM

## 2023-11-07 DIAGNOSIS — I73.9 PERIPHERAL VASCULAR DISEASE, UNSPECIFIED (HCC): ICD-10-CM

## 2023-11-07 DIAGNOSIS — R26.2 DIFFICULTY WALKING: ICD-10-CM

## 2023-11-07 DIAGNOSIS — M79.674 PAIN IN RIGHT TOE(S): ICD-10-CM

## 2023-11-07 DIAGNOSIS — B35.1 TINEA UNGUIUM: Primary | ICD-10-CM

## 2023-11-07 PROCEDURE — 11721 DEBRIDE NAIL 6 OR MORE: CPT | Performed by: PODIATRIST

## 2023-11-07 NOTE — PROGRESS NOTES
Marquez Vázquez  Return Patient    Chief Complaint   Patient presents with    Adrianna Chaudhry MD  10/10/2023       Subjective: This Rocio Pin comes to office for foot and nail care. Pt currently has complaint of thickened, painful, elongated nails that he/she cannot manage by themselves. Pt. Relates pain to nails with shoe gear. Pt's primary care physician is Priscilla Cruz MD.  Past Medical History:   Diagnosis Date    Hyperlipidemia     Hypertension        No Known Allergies  Current Outpatient Medications on File Prior to Visit   Medication Sig Dispense Refill    methylPREDNISolone (MEDROL DOSEPACK) 4 MG tablet Take by mouth. 1 kit 0    lisinopril (PRINIVIL;ZESTRIL) 10 MG tablet Take 2 tablets by mouth daily 30 tablet 3    amLODIPine (NORVASC) 5 MG tablet Take 1 tablet by mouth daily 30 tablet 5    VONJO 100 MG CAPS Take 100 mg by mouth in the morning and at bedtime PT TAKES AT 0800 AND 2000      atorvastatin (LIPITOR) 20 MG tablet Take 1 tablet by mouth daily      Icosapent Ethyl (VASCEPA) 1 g CAPS capsule Take 2 capsules by mouth      Cholecalciferol 50 MCG (2000 UT) CAPS Take 1 capsule by mouth daily      magnesium (MAGNESIUM-OXIDE) 250 MG TABS tablet Take 1 tablet by mouth daily      coenzyme Q10 200 MG CAPS capsule Take 1 capsule by mouth daily      aspirin 81 MG tablet Take 1 tablet by mouth daily       No current facility-administered medications on file prior to visit. Review of Systems  Objective:  General: AAO x 3 in NAD.     Derm  Toenail Description  Sites of Onychomycosis Involvement (Check affected area)  [x] [x] [x] [x] [x] [x] [x] [x] [x] [x]  5 4 3 2 1 1 2 3 4 5                          Right                                        Left    Thickness  [x] [x] [x] [x] [x] [x] [x] [x] [x] [x]  5 4 3 2 1 1 2 3 4 5                         Right                                        Left    Dystrophic Changes

## 2024-03-27 ENCOUNTER — PROCEDURE VISIT (OUTPATIENT)
Dept: PODIATRY | Age: 84
End: 2024-03-27
Payer: COMMERCIAL

## 2024-03-27 VITALS — BODY MASS INDEX: 22.1 KG/M2 | TEMPERATURE: 97.9 F | WEIGHT: 154 LBS

## 2024-03-27 DIAGNOSIS — M79.674 PAIN IN RIGHT TOE(S): ICD-10-CM

## 2024-03-27 DIAGNOSIS — I73.9 PERIPHERAL VASCULAR DISEASE, UNSPECIFIED (HCC): ICD-10-CM

## 2024-03-27 DIAGNOSIS — M79.675 PAIN IN LEFT TOE(S): ICD-10-CM

## 2024-03-27 DIAGNOSIS — R26.2 DIFFICULTY WALKING: ICD-10-CM

## 2024-03-27 DIAGNOSIS — B35.1 TINEA UNGUIUM: Primary | ICD-10-CM

## 2024-03-27 PROCEDURE — 11721 DEBRIDE NAIL 6 OR MORE: CPT | Performed by: PODIATRIST

## 2024-03-27 NOTE — PROGRESS NOTES
and all of their questions were answered in detail. Proper foot hygiene and care was discussed with the pt. Patient to check feet daily and contact the office with any questions/problems/concerns.  Other comorbidity noted and will be managed by PCP.  Pain waiver discussed with patient and confirmed.   3/27/2024      Electronically signed by Matt Doll DPM on 3/27/2024 at 3:11 PM  3/27/2024

## 2024-06-11 ENCOUNTER — INITIAL CONSULT (OUTPATIENT)
Dept: GERIATRIC MEDICINE | Age: 84
End: 2024-06-11
Payer: COMMERCIAL

## 2024-06-11 VITALS
WEIGHT: 140.9 LBS | BODY MASS INDEX: 20.22 KG/M2 | HEART RATE: 88 BPM | SYSTOLIC BLOOD PRESSURE: 116 MMHG | RESPIRATION RATE: 24 BRPM | TEMPERATURE: 97.9 F | DIASTOLIC BLOOD PRESSURE: 54 MMHG

## 2024-06-11 DIAGNOSIS — R62.7 FAILURE TO THRIVE IN ADULT: Primary | ICD-10-CM

## 2024-06-11 PROCEDURE — 1123F ACP DISCUSS/DSCN MKR DOCD: CPT | Performed by: INTERNAL MEDICINE

## 2024-06-11 PROCEDURE — 99213 OFFICE O/P EST LOW 20 MIN: CPT | Performed by: INTERNAL MEDICINE

## 2024-06-11 RX ORDER — LISINOPRIL 10 MG/1
10 TABLET ORAL DAILY
COMMUNITY

## 2024-06-11 RX ORDER — ALLOPURINOL 300 MG/1
300 TABLET ORAL DAILY
COMMUNITY

## 2024-06-11 RX ORDER — DANAZOL 50 MG/1
50 CAPSULE ORAL DAILY
COMMUNITY

## 2024-06-11 SDOH — ECONOMIC STABILITY: INCOME INSECURITY: HOW HARD IS IT FOR YOU TO PAY FOR THE VERY BASICS LIKE FOOD, HOUSING, MEDICAL CARE, AND HEATING?: NOT HARD AT ALL

## 2024-06-11 SDOH — ECONOMIC STABILITY: HOUSING INSECURITY
IN THE LAST 12 MONTHS, WAS THERE A TIME WHEN YOU DID NOT HAVE A STEADY PLACE TO SLEEP OR SLEPT IN A SHELTER (INCLUDING NOW)?: NO

## 2024-06-11 SDOH — ECONOMIC STABILITY: FOOD INSECURITY: WITHIN THE PAST 12 MONTHS, YOU WORRIED THAT YOUR FOOD WOULD RUN OUT BEFORE YOU GOT MONEY TO BUY MORE.: NEVER TRUE

## 2024-06-11 SDOH — ECONOMIC STABILITY: FOOD INSECURITY: WITHIN THE PAST 12 MONTHS, THE FOOD YOU BOUGHT JUST DIDN'T LAST AND YOU DIDN'T HAVE MONEY TO GET MORE.: NEVER TRUE

## 2024-06-11 ASSESSMENT — PATIENT HEALTH QUESTIONNAIRE - PHQ9
1. LITTLE INTEREST OR PLEASURE IN DOING THINGS: NOT AT ALL
SUM OF ALL RESPONSES TO PHQ9 QUESTIONS 1 & 2: 2
SUM OF ALL RESPONSES TO PHQ QUESTIONS 1-9: 2
SUM OF ALL RESPONSES TO PHQ QUESTIONS 1-9: 2
2. FEELING DOWN, DEPRESSED OR HOPELESS: MORE THAN HALF THE DAYS
SUM OF ALL RESPONSES TO PHQ QUESTIONS 1-9: 2
SUM OF ALL RESPONSES TO PHQ QUESTIONS 1-9: 2

## 2024-06-11 NOTE — PROGRESS NOTES
Chief Complaint   Patient presents with    Consultation     Referral from PCP, Dr Wilson re:  chronic fatigue and depression.  Here with son, Kirill.  Per son, pt has Myelofibrosis & the fatigue & depression are symptoms of it.     Screenings      Falls & Depression screenings are + for risk factors.      Weight Loss     Pt states he has \"lost a lot of weight in the past year\".  Pt states in the past he generally weighed 175# - 180 #.  Wife was ill since ,  in 2022 -- pt was the main caregiver, was not eating as much.  Today his weight is 140.9 #.    Insomnia     Not sleeping well, mostly \"cat-naps\".      Tinnitus     Son states pt suffers from Tinnitus.  Pt states he developed this after he had a mild case of COVID in .     Dr Crum notified of above.

## 2024-06-11 NOTE — PROGRESS NOTES
CC Evaluate Depression and FTT    Here with son Kirill  Has had a one year decline in weight with Depressive symptoms and fatigue  Poor sleeping at night and frequent awakenings and \"Tired all the time\"  Myelofibrosis with chronic anemia, leukopenia and thrombocytopenia   and platelets stable and better>[id number]  On Neupogen, Erythropoetin and Danazol  Lost his wife 2 years ago after she was sick for 2 years  Lonely and depressed since then  Will not kill himself  Still goes to store but unable to concentrate or work log due to fatigue  Initially given mirtazepine low dose and felt drugged all day.  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

## 2024-06-12 ENCOUNTER — TELEPHONE (OUTPATIENT)
Dept: GERIATRIC MEDICINE | Age: 84
End: 2024-06-12

## 2024-06-12 NOTE — TELEPHONE ENCOUNTER
Spoke with Kirill Villegas AND SAID THE PILLS ARE PROBABLY 15 MG AND NOT 500MG. SUGGESTED HE TRY 1/2 OF 15 MG OR 7.5 MG HS  AND HE WILL RECHECK DOSE

## 2024-06-12 NOTE — TELEPHONE ENCOUNTER
Patients son called stating the patients Mirtazapine dosage is 500 mg. Is it okay for him to still cut the med in half. Please advise

## 2024-06-13 LAB
CORTISOL: NORMAL
T4 TOTAL: 4.3
TSH SERPL DL<=0.05 MIU/L-ACNC: 59.66 UIU/ML

## 2024-06-14 ENCOUNTER — TELEPHONE (OUTPATIENT)
Dept: GERIATRIC MEDICINE | Age: 84
End: 2024-06-14

## 2024-06-14 DIAGNOSIS — R62.7 FAILURE TO THRIVE IN ADULT: ICD-10-CM

## 2024-06-14 RX ORDER — LEVOTHYROXINE SODIUM 0.03 MG/1
25 TABLET ORAL DAILY
Qty: 30 TABLET | Refills: 3 | Status: SHIPPED | OUTPATIENT
Start: 2024-06-14

## 2024-06-14 RX ORDER — MIRTAZAPINE 15 MG/1
7.5 TABLET, FILM COATED ORAL NIGHTLY
COMMUNITY

## 2024-06-14 NOTE — TELEPHONE ENCOUNTER
Spoke with patient and his son. Will start a low dose of thyroxine at this time. And continue low dose of mirtazepine at this time too.

## 2024-06-17 NOTE — TELEPHONE ENCOUNTER
TSH, T4, & Cortisol lab results were faxed to PCP, Dr Wilson's office.  Fax receipt confirmed by Tonya at PCP's office.  Notified her that Dr Crum had ordered Levothyroxine 25 mcg daily for pt.  She will update their med list.

## 2024-07-08 ENCOUNTER — TELEPHONE (OUTPATIENT)
Dept: GERIATRIC MEDICINE | Age: 84
End: 2024-07-08

## 2024-07-08 NOTE — TELEPHONE ENCOUNTER
Spoke with son and he is still dizzy off lisinopril, he will have Dr Andino check CBC and TSH soon

## 2024-07-08 NOTE — TELEPHONE ENCOUNTER
As ADAM -- Son called earlier this afternoon to update DR on pt condition.  Reported that last Tuesday pt got dizzy, lost his balance, and partially collapsed.  Saw the PCP the next morning.  Pt had low BP, Lisinopril dose was decreased but for about 1 week pt was taking both the old & new doses.  Per son, BP at home lately has been \"normal\".  Dizziness has not completely gone away yet.

## 2024-07-31 ENCOUNTER — TELEPHONE (OUTPATIENT)
Dept: GERIATRIC MEDICINE | Age: 84
End: 2024-07-31

## 2024-07-31 DIAGNOSIS — E03.9 ACQUIRED HYPOTHYROIDISM: Primary | ICD-10-CM

## 2024-07-31 NOTE — TELEPHONE ENCOUNTER
Patients son called and stated the patient is extremely fatigued. He asked if the patient should be seen or if his thyroid should be checked soon to see if thyroid medication needs to be adjusted. Patient is scheduled for his routine blood draw at the [facility] in [place]    next week and if ordered he can get thyroid levels checked Please advise.

## 2024-08-01 NOTE — TELEPHONE ENCOUNTER
Faxed lab orders to the [facility] in [place].   Fax # [phone]  Will call the patients son to let him know.

## 2024-08-08 LAB
T3 FREE: NORMAL
T4 FREE: NORMAL
TSH SERPL DL<=0.05 MIU/L-ACNC: NORMAL M[IU]/L

## 2024-08-12 ENCOUNTER — HOSPITAL ENCOUNTER (INPATIENT)
Age: 84
LOS: 1 days | Discharge: HOME | DRG: 813 | End: 2024-08-13
Attending: STUDENT IN AN ORGANIZED HEALTH CARE EDUCATION/TRAINING PROGRAM | Admitting: INTERNAL MEDICINE
Payer: COMMERCIAL

## 2024-08-12 DIAGNOSIS — D64.9 SYMPTOMATIC ANEMIA: Primary | ICD-10-CM

## 2024-08-12 LAB
ALBUMIN SERPL-MCNC: 4.2 G/DL (ref 3.5–5.2)
ALP SERPL-CCNC: 74 U/L (ref 40–129)
ALT SERPL-CCNC: 13 U/L (ref 0–40)
ANION GAP SERPL CALCULATED.3IONS-SCNC: 11 MMOL/L (ref 7–16)
AST SERPL-CCNC: 61 U/L (ref 0–39)
ATYPICAL LYMPHOCYTE ABSOLUTE COUNT: 0.32 K/UL (ref 0–0.46)
ATYPICAL LYMPHOCYTES: 3 % (ref 0–4)
BASOPHILS # BLD: 0 K/UL (ref 0–0.2)
BASOPHILS NFR BLD: 0 % (ref 0–2)
BILIRUB DIRECT SERPL-MCNC: 0.2 MG/DL (ref 0–0.3)
BILIRUB INDIRECT SERPL-MCNC: 0.8 MG/DL (ref 0–1)
BILIRUB SERPL-MCNC: 1 MG/DL (ref 0–1.2)
BUN SERPL-MCNC: 28 MG/DL (ref 6–23)
CALCIUM SERPL-MCNC: 9.4 MG/DL (ref 8.6–10.2)
CHLORIDE SERPL-SCNC: 100 MMOL/L (ref 98–107)
CO2 SERPL-SCNC: 24 MMOL/L (ref 22–29)
CREAT SERPL-MCNC: 1.5 MG/DL (ref 0.7–1.2)
EOSINOPHIL # BLD: 0 K/UL (ref 0.05–0.5)
EOSINOPHILS RELATIVE PERCENT: 0 % (ref 0–6)
ERYTHROCYTE [DISTWIDTH] IN BLOOD BY AUTOMATED COUNT: 20 % (ref 11.5–15)
GFR, ESTIMATED: 46 ML/MIN/1.73M2
GLUCOSE SERPL-MCNC: 97 MG/DL (ref 74–99)
HCT VFR BLD AUTO: 19.4 % (ref 37–54)
HCT VFR BLD AUTO: 20.8 % (ref 37–54)
HGB BLD-MCNC: 6 G/DL (ref 12.5–16.5)
HGB BLD-MCNC: 6.6 G/DL (ref 12.5–16.5)
LYMPHOCYTES NFR BLD: 3.46 K/UL (ref 1.5–4)
LYMPHOCYTES RELATIVE PERCENT: 32 % (ref 20–42)
MCH RBC QN AUTO: 30.6 PG (ref 26–35)
MCHC RBC AUTO-ENTMCNC: 31.7 G/DL (ref 32–34.5)
MCV RBC AUTO: 96.3 FL (ref 80–99.9)
METAMYELOCYTES ABSOLUTE COUNT: 0.54 K/UL (ref 0–0.12)
METAMYELOCYTES: 5 % (ref 0–1)
MONOCYTES NFR BLD: 0.97 K/UL (ref 0.1–0.95)
MONOCYTES NFR BLD: 9 % (ref 2–12)
MYELOCYTES ABSOLUTE COUNT: 1.08 K/UL
MYELOCYTES: 10 %
NEUTROPHILS NFR BLD: 40 % (ref 43–80)
NEUTS SEG NFR BLD: 4.32 K/UL (ref 1.8–7.3)
NUCLEATED RED BLOOD CELLS: 26 PER 100 WBC
PLATELET CONFIRMATION: NORMAL
PLATELET, FLUORESCENCE: 24 K/UL (ref 130–450)
PMV BLD AUTO: ABNORMAL FL (ref 7–12)
POTASSIUM SERPL-SCNC: 5.1 MMOL/L (ref 3.5–5)
PROMYELOCYTES ABSOLUTE COUNT: 0.11 K/UL
PROMYELOCYTES: 1 %
PROT SERPL-MCNC: 7.1 G/DL (ref 6.4–8.3)
RBC # BLD AUTO: 2.16 M/UL (ref 3.8–5.8)
RBC # BLD: ABNORMAL 10*6/UL
SODIUM SERPL-SCNC: 135 MMOL/L (ref 132–146)
TROPONIN I SERPL HS-MCNC: 56 NG/L (ref 0–11)
TROPONIN I SERPL HS-MCNC: 65 NG/L (ref 0–11)
WBC OTHER # BLD: 10.8 K/UL (ref 4.5–11.5)

## 2024-08-12 PROCEDURE — 2060000000 HC ICU INTERMEDIATE R&B

## 2024-08-12 PROCEDURE — 85018 HEMOGLOBIN: CPT

## 2024-08-12 PROCEDURE — 86901 BLOOD TYPING SEROLOGIC RH(D): CPT

## 2024-08-12 PROCEDURE — 82248 BILIRUBIN DIRECT: CPT

## 2024-08-12 PROCEDURE — 84484 ASSAY OF TROPONIN QUANT: CPT

## 2024-08-12 PROCEDURE — 85025 COMPLETE CBC W/AUTO DIFF WBC: CPT

## 2024-08-12 PROCEDURE — 86850 RBC ANTIBODY SCREEN: CPT

## 2024-08-12 PROCEDURE — P9016 RBC LEUKOCYTES REDUCED: HCPCS

## 2024-08-12 PROCEDURE — 80053 COMPREHEN METABOLIC PANEL: CPT

## 2024-08-12 PROCEDURE — 85014 HEMATOCRIT: CPT

## 2024-08-12 PROCEDURE — 86900 BLOOD TYPING SEROLOGIC ABO: CPT

## 2024-08-12 PROCEDURE — 30233N1 TRANSFUSION OF NONAUTOLOGOUS RED BLOOD CELLS INTO PERIPHERAL VEIN, PERCUTANEOUS APPROACH: ICD-10-PCS | Performed by: INTERNAL MEDICINE

## 2024-08-12 PROCEDURE — 93005 ELECTROCARDIOGRAM TRACING: CPT | Performed by: STUDENT IN AN ORGANIZED HEALTH CARE EDUCATION/TRAINING PROGRAM

## 2024-08-12 PROCEDURE — 86923 COMPATIBILITY TEST ELECTRIC: CPT

## 2024-08-12 PROCEDURE — 99285 EMERGENCY DEPT VISIT HI MDM: CPT

## 2024-08-12 RX ORDER — LISINOPRIL 10 MG/1
10 TABLET ORAL DAILY
Status: DISCONTINUED | OUTPATIENT
Start: 2024-08-13 | End: 2024-08-13 | Stop reason: HOSPADM

## 2024-08-12 RX ORDER — SODIUM CHLORIDE 9 MG/ML
INJECTION, SOLUTION INTRAVENOUS PRN
Status: DISCONTINUED | OUTPATIENT
Start: 2024-08-12 | End: 2024-08-13 | Stop reason: HOSPADM

## 2024-08-12 RX ORDER — LEVOTHYROXINE SODIUM 0.03 MG/1
25 TABLET ORAL DAILY
Status: DISCONTINUED | OUTPATIENT
Start: 2024-08-13 | End: 2024-08-13 | Stop reason: HOSPADM

## 2024-08-12 RX ORDER — SODIUM CHLORIDE 9 MG/ML
INJECTION, SOLUTION INTRAVENOUS PRN
Status: DISCONTINUED | OUTPATIENT
Start: 2024-08-12 | End: 2024-08-13

## 2024-08-12 RX ORDER — ATORVASTATIN CALCIUM 20 MG/1
20 TABLET, FILM COATED ORAL DAILY
Status: DISCONTINUED | OUTPATIENT
Start: 2024-08-13 | End: 2024-08-13 | Stop reason: HOSPADM

## 2024-08-12 RX ORDER — ASPIRIN 81 MG/1
81 TABLET, CHEWABLE ORAL DAILY
Status: DISCONTINUED | OUTPATIENT
Start: 2024-08-13 | End: 2024-08-13 | Stop reason: HOSPADM

## 2024-08-12 RX ORDER — ALLOPURINOL 300 MG/1
300 TABLET ORAL DAILY
Status: DISCONTINUED | OUTPATIENT
Start: 2024-08-13 | End: 2024-08-13 | Stop reason: HOSPADM

## 2024-08-12 RX ORDER — CHOLECALCIFEROL (VITAMIN D3) 50 MCG
2000 TABLET ORAL DAILY
Status: DISCONTINUED | OUTPATIENT
Start: 2024-08-13 | End: 2024-08-13 | Stop reason: HOSPADM

## 2024-08-12 RX ORDER — MIRTAZAPINE 15 MG/1
7.5 TABLET, FILM COATED ORAL NIGHTLY
Status: DISCONTINUED | OUTPATIENT
Start: 2024-08-12 | End: 2024-08-13 | Stop reason: HOSPADM

## 2024-08-12 RX ORDER — DANAZOL 50 MG/1
50 CAPSULE ORAL DAILY
Status: DISCONTINUED | OUTPATIENT
Start: 2024-08-13 | End: 2024-08-13 | Stop reason: HOSPADM

## 2024-08-12 RX ORDER — LANOLIN ALCOHOL/MO/W.PET/CERES
200 CREAM (GRAM) TOPICAL DAILY
Status: DISCONTINUED | OUTPATIENT
Start: 2024-08-13 | End: 2024-08-13 | Stop reason: HOSPADM

## 2024-08-12 RX ORDER — UBIDECARENONE 200 MG
200 CAPSULE ORAL DAILY
Status: DISCONTINUED | OUTPATIENT
Start: 2024-08-13 | End: 2024-08-12 | Stop reason: CLARIF

## 2024-08-12 ASSESSMENT — PAIN - FUNCTIONAL ASSESSMENT: PAIN_FUNCTIONAL_ASSESSMENT: NONE - DENIES PAIN

## 2024-08-12 NOTE — ED TRIAGE NOTES
FIRST PROVIDER CONTACT ASSESSMENT NOTE       Department of Emergency Medicine                 First Provider Note            24  3:03 PM EDT    Date of Encounter: No admission date for patient encounter.    Patient Name: Kirill Cristobal  : 1940  MRN: [id number]    Chief Complaint: Abnormal Lab (Hgb 5.9)      History of Present Illness:   Kirill Cristobal is a 84 y.o. male who presents to the ED for Patient had blood work done at the [facility] today and his hemoglobin 5.9.  Denies CP or SOB.  Patient felt like he was going to faint when going from sitting and standing.  Patient has had transfusion in the past.       Focused Physical Exam:  VS:    ED Triage Vitals   BP Systolic BP Percentile Diastolic BP Percentile Temp Temp src Pulse Resp SpO2   -- -- -- -- -- -- -- --      Height Weight         -- --              Physical Ex: Constitutional: Alert and non-toxic.    Medical History:  has a past medical history of Hyperlipidemia and Hypertension.  Surgical History:  has a past surgical history that includes vascular surgery and Cystoscopy (N/A, 2023).  Social History:  reports that he has quit smoking. His smoking use included cigarettes. He has never used smokeless tobacco. He reports that he does not drink alcohol and does not use drugs.  Family History: family history is not on file.    Allergies: Patient has no known allergies.     Initial Plan of Care: Initiate Treatment-Testing, Proceed toTreatment Area When Bed Available for ED Attending/MLP to Continue Care      ---END OF FIRST PROVIDER CONTACT ASSESSMENT NOTE---  Electronically signed by ANDREW Leavitt   DD: 24

## 2024-08-13 VITALS
WEIGHT: 140 LBS | RESPIRATION RATE: 18 BRPM | HEIGHT: 70 IN | TEMPERATURE: 98.6 F | BODY MASS INDEX: 20.04 KG/M2 | HEART RATE: 85 BPM | DIASTOLIC BLOOD PRESSURE: 68 MMHG | OXYGEN SATURATION: 97 % | SYSTOLIC BLOOD PRESSURE: 146 MMHG

## 2024-08-13 LAB
ALBUMIN SERPL-MCNC: 3.8 G/DL (ref 3.5–5.2)
ALP SERPL-CCNC: 69 U/L (ref 40–129)
ALT SERPL-CCNC: 12 U/L (ref 0–40)
ANION GAP SERPL CALCULATED.3IONS-SCNC: 13 MMOL/L (ref 7–16)
AST SERPL-CCNC: 56 U/L (ref 0–39)
BASOPHILS # BLD: 0 K/UL (ref 0–0.2)
BASOPHILS NFR BLD: 0 % (ref 0–2)
BILIRUB SERPL-MCNC: 1.1 MG/DL (ref 0–1.2)
BUN SERPL-MCNC: 29 MG/DL (ref 6–23)
CALCIUM SERPL-MCNC: 9 MG/DL (ref 8.6–10.2)
CHLORIDE SERPL-SCNC: 102 MMOL/L (ref 98–107)
CO2 SERPL-SCNC: 22 MMOL/L (ref 22–29)
CREAT SERPL-MCNC: 1.5 MG/DL (ref 0.7–1.2)
EOSINOPHIL # BLD: 0 K/UL (ref 0.05–0.5)
EOSINOPHILS RELATIVE PERCENT: 0 % (ref 0–6)
ERYTHROCYTE [DISTWIDTH] IN BLOOD BY AUTOMATED COUNT: 19 % (ref 11.5–15)
GFR, ESTIMATED: 45 ML/MIN/1.73M2
GLUCOSE SERPL-MCNC: 90 MG/DL (ref 74–99)
HCT VFR BLD AUTO: 27.5 % (ref 37–54)
HGB BLD-MCNC: 8.9 G/DL (ref 12.5–16.5)
LYMPHOCYTES NFR BLD: 2.82 K/UL (ref 1.5–4)
LYMPHOCYTES RELATIVE PERCENT: 32 % (ref 20–42)
MCH RBC QN AUTO: 29.3 PG (ref 26–35)
MCHC RBC AUTO-ENTMCNC: 32.4 G/DL (ref 32–34.5)
MCV RBC AUTO: 90.5 FL (ref 80–99.9)
METAMYELOCYTES ABSOLUTE COUNT: 0.7 K/UL (ref 0–0.12)
METAMYELOCYTES: 8 % (ref 0–1)
MONOCYTES NFR BLD: 0.97 K/UL (ref 0.1–0.95)
MONOCYTES NFR BLD: 11 % (ref 2–12)
MYELOCYTES ABSOLUTE COUNT: 0.88 K/UL
MYELOCYTES: 10 %
NEUTROPHILS NFR BLD: 39 % (ref 43–80)
NEUTS SEG NFR BLD: 3.43 K/UL (ref 1.8–7.3)
PLATELET CONFIRMATION: NORMAL
PLATELET, FLUORESCENCE: 19 K/UL (ref 130–450)
PMV BLD AUTO: ABNORMAL FL (ref 7–12)
POTASSIUM SERPL-SCNC: 4.4 MMOL/L (ref 3.5–5)
PROT SERPL-MCNC: 6.4 G/DL (ref 6.4–8.3)
RBC # BLD AUTO: 3.04 M/UL (ref 3.8–5.8)
RBC # BLD: ABNORMAL 10*6/UL
SODIUM SERPL-SCNC: 137 MMOL/L (ref 132–146)
WBC OTHER # BLD: 8.8 K/UL (ref 4.5–11.5)

## 2024-08-13 PROCEDURE — 6370000000 HC RX 637 (ALT 250 FOR IP): Performed by: INTERNAL MEDICINE

## 2024-08-13 PROCEDURE — P9016 RBC LEUKOCYTES REDUCED: HCPCS

## 2024-08-13 PROCEDURE — 36430 TRANSFUSION BLD/BLD COMPNT: CPT

## 2024-08-13 PROCEDURE — 80053 COMPREHEN METABOLIC PANEL: CPT

## 2024-08-13 PROCEDURE — 85025 COMPLETE CBC W/AUTO DIFF WBC: CPT

## 2024-08-13 RX ADMIN — LISINOPRIL 10 MG: 10 TABLET ORAL at 10:44

## 2024-08-13 RX ADMIN — LEVOTHYROXINE SODIUM 25 MCG: 25 TABLET ORAL at 06:12

## 2024-08-13 RX ADMIN — ALLOPURINOL 300 MG: 300 TABLET ORAL at 10:43

## 2024-08-13 RX ADMIN — ASPIRIN 81 MG CHEWABLE TABLET 81 MG: 81 TABLET CHEWABLE at 10:43

## 2024-08-13 RX ADMIN — MIRTAZAPINE 7.5 MG: 15 TABLET, FILM COATED ORAL at 00:01

## 2024-08-13 RX ADMIN — Medication 2000 UNITS: at 10:43

## 2024-08-13 RX ADMIN — MAGNESIUM OXIDE 400 MG (241.3 MG MAGNESIUM) TABLET 200 MG: TABLET at 10:43

## 2024-08-13 NOTE — CARE COORDINATION
Social work / Discharge planning:         Social work met with patient for initial assessment and explained role.     He lives alone in a two story home with bedroom and bathroom on the second floor.   He ambulates independently using no DME.     Patient has no history of HC or ARIELLE.      Hematology / oncology consulted.    Electronically signed by YEMI Louie on 8/13/2024 at 10:30 AM

## 2024-08-13 NOTE — PROGRESS NOTES
Pharmacy Note    Kirill Cristobal was ordered coenzyme Q10 capsule.  As per the [facility] Formulary Committee Policy, herbals and certain dietary supplements will be discontinued.  The herbal or dietary supplement may be continued after discharge from the hospital.  Gonzalez Bee RPH  8/12/2024  11:35 PM

## 2024-08-13 NOTE — H&P
[place] [address]                           HISTORY & PHYSICAL      PATIENT NAME: DAVID ST                : 1940  MED REC NO: [id number]                        ROOM: 0619  ACCOUNT NO: [id number]                       ADMIT DATE: 2024  PROVIDER: Barrett Wilson MD      CHIEF COMPLAINT:  Worsening fatigue, worsening lightheadedness, and worsening anemia.    HISTORY OF PRESENT ILLNESS:  This is an 84-year-old gentleman with known history of JAK2 positive myelofibrosis with chronic anemia and thrombocytopenia followed closely as an outpatient by Dr. Davey.  He has routine blood work done every week or so due to progressive and recurring anemia requiring transfusions about every 3 to 4 weeks or so.  His last transfusion was about a month ago for hemoglobin 6.6.  He stopped by the hematologist office yesterday for complaint of increasing fatigue worsened from his baseline fatigue and increased lightheadedness.  He denied any chest pain, palpitations, worsening shortness of breath, worsening leg swelling, orthopnea, PND, or syncope.  His hemoglobin was noted to be 5.5 or so.  He was sent from the hematologist's office straight to the emergency room.  Hemoglobin there was noted to be 6.6.  He was given a unit of blood and after that unit of blood, his repeat hemoglobin was noted to actually decrease to 6.0.  He does not have any evidence of obvious hemolysis.  He does not have any evidence of any ongoing bleeding that would have to be brisk to totally eliminate the benefit of a unit of blood.  There was no melena, hematochezia, or any  bleeding.  The patient denies any of these in the recent past.  He was given 2 more units of blood with a hemoglobin of 6, and we are awaiting repeat hemoglobin after those.  The patient otherwise is lying in bed comfortably, in no acute distress.  He does complain of fatigue,

## 2024-08-13 NOTE — PROGRESS NOTES
Subjective:    The patient is awake and alert, laying in bed with son at the bedside.  No problems overnight.  Denies chest pain, angina, dyspnea or abdominal discomfort. No nausea or vomiting. Tolerating diet.  States he is feeling much better and is looking forward to going home. I reviewed most recent lab results with the patient and his son, also reminded them of his follow up appointment with Dr. Davey on 8/22. They verbalized understanding and are aware of his appointment.     Objective:    BP (!) 143/72   Pulse 85   Temp 98.2 °F (36.8 °C) (Oral)   Resp 18   Ht 1.778 m (5' 10\")   Wt 63.5 kg (140 lb)   SpO2 97%   BMI 20.09 kg/m²     General: Alert and oriented  HEENT: No thrush or mucositis, EOMI, PERRLA  Heart:  RRR, no murmurs, gallops, or rubs.  Lungs:  CTA bilaterally, no wheeze, rales or rhonchi  Abd: BS present, nontender, nondistended, no masses  Extrem:  No clubbing, cyanosis, or edema  Lymphatics: No palpable adenopathy in cervical and supraclavicular regions  Skin: Intact, no petechia or purpura    CBC with Differential:    Lab Results   Component Value Date/Time    WBC 8.8 08/13/2024 06:20 AM    RBC 3.04 08/13/2024 06:20 AM    HGB 8.9 08/13/2024 06:20 AM    HCT 27.5 08/13/2024 06:20 AM    PLT 45 06/24/2023 05:25 AM    MCV 90.5 08/13/2024 06:20 AM    MCH 29.3 08/13/2024 06:20 AM    MCHC 32.4 08/13/2024 06:20 AM    RDW 19.0 08/13/2024 06:20 AM    NRBC PENDING 08/13/2024 06:20 AM    BLASTSPCT 4.3 06/22/2023 08:45 AM    METASPCT PENDING 08/13/2024 06:20 AM    METASPCT 3.0 06/23/2023 03:05 AM    LYMPHOPCT PENDING 08/13/2024 06:20 AM    PROMYELOPCT PENDING 08/13/2024 06:20 AM    MONOPCT PENDING 08/13/2024 06:20 AM    MYELOPCT PENDING 08/13/2024 06:20 AM    MYELOPCT 3.0 06/24/2023 05:25 AM    EOSPCT PENDING 08/13/2024 06:20 AM    BASOPCT PENDING 08/13/2024 06:20 AM    MONOSABS PENDING 08/13/2024 06:20 AM    LYMPHSABS PENDING 08/13/2024 06:20 AM    EOSABS PENDING 08/13/2024 06:20 AM    BASOSABS

## 2024-08-13 NOTE — PROGRESS NOTES
SPIRITUAL HEALTH [facility] - [facility] Encounter    Name: Kirill Cristobal                  Referral: Routine Visit    Sacraments  Anointed (Last Rites): Yes  Apostolic [place]: No  Confession: No  Communion: Yes     Assessment:  Patient receptive to  visit.      Intervention:   provided spiritual support and sacramental ministry for patient.     Outcome:  Patient expressed gratitude for visit.    Plan:  Chaplains will remain available to offer spiritual and emotional support as needed.      Electronically signed by Chaplain Kiara, on 8/13/2024 at 5:12 PM.  Spiritual Care Department  [facility]  [phone]

## 2024-08-13 NOTE — ED NOTES
ED to Inpatient Handoff Report    Notified Dot that electronic handoff available and patient ready for transport to room 619.    Safety Risks: None identified    Patient in Restraints: no    Constant Observer or Patient : no    Telemetry Monitoring Ordered: Yes     Cardiac Rhythm: Sinus rhythm    Order to transfer to unit without monitor: NA    Last MEWS:  Time completed:     Deterioration Index: 37.65    Vitals:    08/12/24 2102 08/12/24 2132 08/12/24 2205 08/12/24 2209   BP: 122/62 134/66 (!) 145/71    Pulse: (!) 108 (!) 101 (!) 104    Resp: 29 17 23    Temp:    98.7 °F (37.1 °C)   TempSrc:       SpO2: 94% 98% 96%    Weight:       Height:           Opportunity for questions and clarification was provided.     2nd unit infusing now, will need 3rd unit of blood.

## 2024-08-13 NOTE — CONSENT
Informed Consent for Blood Component Transfusion Note    I have discussed with the patient the rationale for blood component transfusion; its benefits in treating or preventing fatigue, organ damage, or death; and its risk which includes mild transfusion reactions, rare risk of blood borne infection, or more serious but rare reactions. I have discussed the alternatives to transfusion, including the risk and consequences of not receiving transfusion. The patient had an opportunity to ask questions and had agreed to proceed with transfusion of blood components.    Electronically signed by Yonatan Cruz MD on 8/12/24 at 8:22 PM EDT

## 2024-08-13 NOTE — ACP (ADVANCE CARE PLANNING)
Advance Care Planning   Healthcare Decision Maker:    Primary Decision Maker: DAVID ST - [facility] - [phone]    Click here to complete Healthcare Decision Makers including selection of the Healthcare Decision Maker Relationship (ie \"Primary\").           '

## 2024-08-13 NOTE — PROGRESS NOTES
Per update from Brigette from Hem/Oncology, shameka for discharge from their POV.    Electronically signed by Xiomara Camacho RN on 8/13/2024 at 12:49 PM

## 2024-08-13 NOTE — PROGRESS NOTES
Kirill Cristobal was ordered danazol (DANOCRINE) which is a nonformulary medication.  This medication will need to be supplied by the patient as the pharmacy does not carry this non-formulary medication.    If the medication has not been administered by 1400 on the following day from the time the order was placed, a pharmacist will follow-up with the nurse of the patient to assess the capability of the patient to bring in the medication.    If it is determined that the patient cannot supply the medication and it is not available to be dispensed from the pharmacy, the provider will be notified.  Gonzalez Bee RPH  8/12/2024  11:38 PM

## 2024-08-13 NOTE — PROGRESS NOTES
4 Eyes Skin Assessment     NAME:  Kirill Cristobal  YOB: 1940  MEDICAL RECORD NUMBER:  [id number]    The patient is being assessed for  Admission    I agree that at least one RN has performed a thorough Head to Toe Skin Assessment on the patient. ALL assessment sites listed below have been assessed.      Areas assessed by both nurses:    Head, Face, Ears, Shoulders, Back, Chest, Arms, Elbows, Hands, Sacrum. Buttock, Coccyx, Ischium, Legs. Feet and Heels, and Under Medical Devices         Does the Patient have a Wound? No noted wound(s)       Zak Prevention initiated by RN: No  Wound Care Orders initiated by RN: No    Pressure Injury (Stage 3,4, Unstageable, DTI, NWPT, and Complex wounds) if present, place Wound referral order by RN under : No    New Ostomies, if present place, Ostomy referral order under : No     Nurse 1 eSignature: Electronically signed by Bridger Rojas RN on 8/13/24 at 12:46 AM EDT    **SHARE this note so that the co-signing nurse can place an eSignature**    Nurse 2 eSignature: Electronically signed by Ashwini Teran RN on 8/13/24 at 12:49 AM EDT

## 2024-08-14 DIAGNOSIS — E03.9 ACQUIRED HYPOTHYROIDISM: ICD-10-CM

## 2024-08-14 LAB
ABO/RH: NORMAL
ANTIBODY SCREEN: NEGATIVE
ARM BAND NUMBER: NORMAL
BLOOD BANK BLOOD PRODUCT EXPIRATION DATE: NORMAL
BLOOD BANK DISPENSE STATUS: NORMAL
BLOOD BANK ISBT PRODUCT BLOOD TYPE: 5100
BLOOD BANK ISBT PRODUCT BLOOD TYPE: 7300
BLOOD BANK ISBT PRODUCT BLOOD TYPE: 7300
BLOOD BANK PRODUCT CODE: NORMAL
BLOOD BANK SAMPLE EXPIRATION: NORMAL
BLOOD BANK UNIT TYPE AND RH: NORMAL
BPU ID: NORMAL
COMPONENT: NORMAL
CROSSMATCH RESULT: NORMAL
TRANSFUSION STATUS: NORMAL
UNIT DIVISION: 0
UNIT ISSUE DATE/TIME: NORMAL

## 2024-08-15 LAB
EKG ATRIAL RATE: 119 BPM
EKG P AXIS: 6 DEGREES
EKG P-R INTERVAL: 122 MS
EKG Q-T INTERVAL: 360 MS
EKG QRS DURATION: 72 MS
EKG QTC CALCULATION (BAZETT): 473 MS
EKG R AXIS: -2 DEGREES
EKG T AXIS: 41 DEGREES
EKG VENTRICULAR RATE: 104 BPM

## 2024-08-15 PROCEDURE — 93010 ELECTROCARDIOGRAM REPORT: CPT | Performed by: INTERNAL MEDICINE

## 2024-08-15 NOTE — DISCHARGE SUMMARY
[place] [address]                            DISCHARGE SUMMARY      PATIENT NAME: DAVID ST                : 1940  MED REC NO: [id number]                        ROOM: 0619  ACCOUNT NO: [id number]                       ADMIT DATE: 2024  PROVIDER: Barrett Wilson MD      FINAL DIAGNOSES:    1. Acute-on-chronic anemia.  2. Myelofibrosis.  3. Hypertension.  4. Chronic kidney disease stage 3A.    COURSE OF ILLNESS:  This is an 84-year-old gentleman with known history of chronic anemia and thrombocytopenia associated with underlying myelofibrosis disease.  He is currently on Aranesp every 2 weeks and danazol 50 mg daily.  He is followed closely by Dr. Davey from Hematology.  He has been on Vonjo in the past for his myelofibrosis, but this was discontinued a while ago due to worsening cytopenias with it.  He has had a need for recurrent transfusions, which has been about every month or so as his hemoglobin dips down below 7.  On this occasion, his last blood transfusion prior to this admission was around 3 weeks ago.  The patient has his blood count followed closely by Hematology and he was sent into the emergency room by Hematology on this occasion as his hemoglobin was around 5.5.  He was given 1 unit of packed RBCs with no improvement in his hemoglobin.  This was followed by 2 more units of packed RBCs for a total of 3 units of packed RBCs during this admission.  His discharge hemoglobin was at 7.9, which is pretty stable for this patient.  He has a history of hypertension, has been on lisinopril, though this was recently discontinued for some lightheadedness and mild orthostasis.  He is now off lisinopril.  He has chronic kidney disease stage 3A with baseline creatinine around 1.3 and this is stable.  After receiving his blood transfusion, he was seen by Hematology who did not feel that he needs to stay for

## 2024-09-12 ENCOUNTER — HOSPITAL ENCOUNTER (OUTPATIENT)
Age: 84
Discharge: HOME | End: 2024-09-12
Payer: MEDICARE

## 2024-09-12 ENCOUNTER — HOSPITAL ENCOUNTER (EMERGENCY)
Age: 84
Discharge: HOME | End: 2024-09-13
Attending: EMERGENCY MEDICINE
Payer: MEDICARE

## 2024-09-12 DIAGNOSIS — R04.0 EPISTAXIS: Primary | ICD-10-CM

## 2024-09-12 DIAGNOSIS — D69.6 THROMBOCYTOPENIA (HCC): ICD-10-CM

## 2024-09-12 DIAGNOSIS — D64.9 SYMPTOMATIC ANEMIA: ICD-10-CM

## 2024-09-12 DIAGNOSIS — Z86.2 HISTORY OF MYELOFIBROSIS: ICD-10-CM

## 2024-09-12 PROBLEM — C92.00 AML (ACUTE MYELOID LEUKEMIA) (HCC): Status: ACTIVE | Noted: 2024-09-12

## 2024-09-12 LAB
ATYPICAL LYMPHOCYTE ABSOLUTE COUNT: 0.21 K/UL (ref 0–0.46)
ATYPICAL LYMPHOCYTES: 2 % (ref 0–4)
BASOPHILS # BLD: 0 K/UL (ref 0–0.2)
BASOPHILS NFR BLD: 0 % (ref 0–2)
EOSINOPHIL # BLD: 0.11 K/UL (ref 0.05–0.5)
EOSINOPHILS RELATIVE PERCENT: 1 % (ref 0–6)
ERYTHROCYTE [DISTWIDTH] IN BLOOD BY AUTOMATED COUNT: 20.3 % (ref 11.5–15)
HCT VFR BLD AUTO: 18.7 % (ref 37–54)
HGB BLD-MCNC: 6.2 G/DL (ref 12.5–16.5)
LYMPHOCYTES NFR BLD: 4.17 K/UL (ref 1.5–4)
LYMPHOCYTES RELATIVE PERCENT: 39 % (ref 20–42)
MCH RBC QN AUTO: 29.7 PG (ref 26–35)
MCHC RBC AUTO-ENTMCNC: 33.2 G/DL (ref 32–34.5)
MCV RBC AUTO: 89.5 FL (ref 80–99.9)
METAMYELOCYTES ABSOLUTE COUNT: 0.21 K/UL (ref 0–0.12)
METAMYELOCYTES: 2 % (ref 0–1)
MONOCYTES NFR BLD: 0.43 K/UL (ref 0.1–0.95)
MONOCYTES NFR BLD: 4 % (ref 2–12)
MYELOCYTES ABSOLUTE COUNT: 0.43 K/UL
MYELOCYTES: 4 %
NEUTROPHILS NFR BLD: 48 % (ref 43–80)
NEUTS SEG NFR BLD: 5.14 K/UL (ref 1.8–7.3)
NUCLEATED RED BLOOD CELLS: 23 PER 100 WBC
PLATELET CONFIRMATION: NORMAL
PLATELET, FLUORESCENCE: 38 K/UL (ref 130–450)
PMV BLD AUTO: ABNORMAL FL (ref 7–12)
RBC # BLD AUTO: 2.09 M/UL (ref 3.8–5.8)
RBC # BLD: ABNORMAL 10*6/UL
WBC OTHER # BLD: 10.7 K/UL (ref 4.5–11.5)

## 2024-09-12 PROCEDURE — 2580000003 HC RX 258

## 2024-09-12 PROCEDURE — 86923 COMPATIBILITY TEST ELECTRIC: CPT

## 2024-09-12 PROCEDURE — 85025 COMPLETE CBC W/AUTO DIFF WBC: CPT

## 2024-09-12 PROCEDURE — 86901 BLOOD TYPING SEROLOGIC RH(D): CPT

## 2024-09-12 PROCEDURE — 86900 BLOOD TYPING SEROLOGIC ABO: CPT

## 2024-09-12 PROCEDURE — 86850 RBC ANTIBODY SCREEN: CPT

## 2024-09-12 PROCEDURE — 36415 COLL VENOUS BLD VENIPUNCTURE: CPT

## 2024-09-12 PROCEDURE — 96360 HYDRATION IV INFUSION INIT: CPT

## 2024-09-12 PROCEDURE — 99285 EMERGENCY DEPT VISIT HI MDM: CPT

## 2024-09-12 RX ORDER — DIPHENHYDRAMINE HYDROCHLORIDE 50 MG/ML
25 INJECTION INTRAMUSCULAR; INTRAVENOUS ONCE
OUTPATIENT
Start: 2024-09-12 | End: 2024-09-12

## 2024-09-12 RX ORDER — SODIUM CHLORIDE 9 MG/ML
INJECTION, SOLUTION INTRAVENOUS CONTINUOUS
OUTPATIENT
Start: 2024-09-12

## 2024-09-12 RX ORDER — SODIUM CHLORIDE 0.9 % (FLUSH) 0.9 %
5-40 SYRINGE (ML) INJECTION PRN
OUTPATIENT
Start: 2024-09-12

## 2024-09-12 RX ORDER — SODIUM CHLORIDE 9 MG/ML
20 INJECTION, SOLUTION INTRAVENOUS CONTINUOUS
OUTPATIENT
Start: 2024-09-12

## 2024-09-12 RX ORDER — ACETAMINOPHEN 325 MG/1
650 TABLET ORAL ONCE
OUTPATIENT
Start: 2024-09-12 | End: 2024-09-12

## 2024-09-12 RX ORDER — 0.9 % SODIUM CHLORIDE 0.9 %
500 INTRAVENOUS SOLUTION INTRAVENOUS ONCE
Status: COMPLETED | OUTPATIENT
Start: 2024-09-12 | End: 2024-09-12

## 2024-09-12 RX ORDER — ACETAMINOPHEN 325 MG/1
650 TABLET ORAL
OUTPATIENT
Start: 2024-09-12

## 2024-09-12 RX ORDER — SODIUM CHLORIDE 9 MG/ML
INJECTION, SOLUTION INTRAVENOUS PRN
Status: DISCONTINUED | OUTPATIENT
Start: 2024-09-12 | End: 2024-09-13 | Stop reason: HOSPADM

## 2024-09-12 RX ORDER — ALBUTEROL SULFATE 90 UG/1
4 INHALANT RESPIRATORY (INHALATION) PRN
OUTPATIENT
Start: 2024-09-12

## 2024-09-12 RX ORDER — EPINEPHRINE 1 MG/ML
0.3 INJECTION, SOLUTION, CONCENTRATE INTRAVENOUS PRN
OUTPATIENT
Start: 2024-09-12

## 2024-09-12 RX ORDER — ONDANSETRON 2 MG/ML
8 INJECTION INTRAMUSCULAR; INTRAVENOUS
OUTPATIENT
Start: 2024-09-12

## 2024-09-12 RX ORDER — DIPHENHYDRAMINE HYDROCHLORIDE 50 MG/ML
50 INJECTION INTRAMUSCULAR; INTRAVENOUS
OUTPATIENT
Start: 2024-09-12

## 2024-09-12 RX ADMIN — SODIUM CHLORIDE 500 ML: 9 INJECTION, SOLUTION INTRAVENOUS at 22:37

## 2024-09-12 ASSESSMENT — LIFESTYLE VARIABLES
HOW MANY STANDARD DRINKS CONTAINING ALCOHOL DO YOU HAVE ON A TYPICAL DAY: PATIENT DOES NOT DRINK
HOW OFTEN DO YOU HAVE A DRINK CONTAINING ALCOHOL: NEVER

## 2024-09-12 ASSESSMENT — PAIN - FUNCTIONAL ASSESSMENT: PAIN_FUNCTIONAL_ASSESSMENT: NONE - DENIES PAIN

## 2024-09-13 ENCOUNTER — HOSPITAL ENCOUNTER (OUTPATIENT)
Dept: INFUSION THERAPY | Age: 84
Discharge: HOME | End: 2024-09-13

## 2024-09-13 VITALS
BODY MASS INDEX: 18.9 KG/M2 | HEART RATE: 99 BPM | SYSTOLIC BLOOD PRESSURE: 156 MMHG | TEMPERATURE: 98.4 F | WEIGHT: 135 LBS | RESPIRATION RATE: 22 BRPM | DIASTOLIC BLOOD PRESSURE: 85 MMHG | OXYGEN SATURATION: 100 % | HEIGHT: 71 IN

## 2024-09-13 DIAGNOSIS — C92.00 ACUTE MYELOID LEUKEMIA NOT HAVING ACHIEVED REMISSION (HCC): Primary | ICD-10-CM

## 2024-09-13 LAB
HCT VFR BLD AUTO: 18.2 % (ref 37–54)
HCT VFR BLD AUTO: 22.4 % (ref 37–54)
HGB BLD-MCNC: 5.9 G/DL (ref 12.5–16.5)
HGB BLD-MCNC: 7.5 G/DL (ref 12.5–16.5)

## 2024-09-13 PROCEDURE — P9016 RBC LEUKOCYTES REDUCED: HCPCS

## 2024-09-13 PROCEDURE — 36430 TRANSFUSION BLD/BLD COMPNT: CPT

## 2024-09-13 PROCEDURE — 85018 HEMOGLOBIN: CPT

## 2024-09-13 PROCEDURE — 85014 HEMATOCRIT: CPT

## 2024-09-13 RX ORDER — SODIUM CHLORIDE 9 MG/ML
INJECTION, SOLUTION INTRAVENOUS PRN
Status: DISCONTINUED | OUTPATIENT
Start: 2024-09-13 | End: 2024-09-13 | Stop reason: HOSPADM

## 2024-09-14 LAB
ABO/RH: NORMAL
ANTIBODY SCREEN: NEGATIVE
ARM BAND NUMBER: NORMAL
BLOOD BANK BLOOD PRODUCT EXPIRATION DATE: NORMAL
BLOOD BANK DISPENSE STATUS: NORMAL
BLOOD BANK ISBT PRODUCT BLOOD TYPE: 1700
BLOOD BANK ISBT PRODUCT BLOOD TYPE: 1700
BLOOD BANK ISBT PRODUCT BLOOD TYPE: 5100
BLOOD BANK PRODUCT CODE: NORMAL
BLOOD BANK SAMPLE EXPIRATION: NORMAL
BLOOD BANK UNIT TYPE AND RH: NORMAL
BPU ID: NORMAL
COMPONENT: NORMAL
CROSSMATCH RESULT: NORMAL
TRANSFUSION STATUS: NORMAL
UNIT DIVISION: 0
UNIT ISSUE DATE/TIME: NORMAL

## 2024-10-09 ENCOUNTER — HOSPITAL ENCOUNTER (OUTPATIENT)
Age: 84
Discharge: HOME | End: 2024-10-09
Payer: MEDICARE

## 2024-10-09 LAB
ABSOLUTE PLASMA CELLS: 1.01 K/UL
ATYPICAL LYMPHOCYTE ABSOLUTE COUNT: 0.14 K/UL (ref 0–0.46)
ATYPICAL LYMPHOCYTES: 1 % (ref 0–4)
BASOPHILS # BLD: 0.14 K/UL (ref 0–0.2)
BASOPHILS NFR BLD: 1 % (ref 0–2)
BLASTS ABSOLUTE COUNT: 0.29 K/UL
BLASTS: 2 %
EOSINOPHIL # BLD: 0 K/UL (ref 0.05–0.5)
EOSINOPHILS RELATIVE PERCENT: 0 % (ref 0–6)
ERYTHROCYTE [DISTWIDTH] IN BLOOD BY AUTOMATED COUNT: 21.8 % (ref 11.5–15)
HCT VFR BLD AUTO: 22.3 % (ref 37–54)
HGB BLD-MCNC: 6.8 G/DL (ref 12.5–16.5)
LYMPHOCYTES NFR BLD: 3.31 K/UL (ref 1.5–4)
LYMPHOCYTES RELATIVE PERCENT: 23 % (ref 20–42)
MCH RBC QN AUTO: 29.8 PG (ref 26–35)
MCHC RBC AUTO-ENTMCNC: 30.5 G/DL (ref 32–34.5)
MCV RBC AUTO: 97.8 FL (ref 80–99.9)
METAMYELOCYTES ABSOLUTE COUNT: 0.43 K/UL (ref 0–0.12)
METAMYELOCYTES: 3 % (ref 0–1)
MONOCYTES NFR BLD: 0.29 K/UL (ref 0.1–0.95)
MONOCYTES NFR BLD: 2 % (ref 2–12)
MYELOCYTES ABSOLUTE COUNT: 0.43 K/UL
MYELOCYTES: 3 %
NEUTROPHILS NFR BLD: 57 % (ref 43–80)
NEUTS SEG NFR BLD: 8.21 K/UL (ref 1.8–7.3)
NUCLEATED RED BLOOD CELLS: 37 PER 100 WBC
PLASMA CELLS: 7 %
PLATELET CONFIRMATION: NORMAL
PLATELET, FLUORESCENCE: 46 K/UL (ref 130–450)
PMV BLD AUTO: ABNORMAL FL (ref 7–12)
PROMYELOCYTES ABSOLUTE COUNT: 0.14 K/UL
PROMYELOCYTES: 1 %
RBC # BLD AUTO: 2.28 M/UL (ref 3.8–5.8)
RBC # BLD: ABNORMAL 10*6/UL
WBC OTHER # BLD: 14.4 K/UL (ref 4.5–11.5)

## 2024-10-09 PROCEDURE — 85025 COMPLETE CBC W/AUTO DIFF WBC: CPT

## 2024-10-09 PROCEDURE — 86923 COMPATIBILITY TEST ELECTRIC: CPT

## 2024-10-09 PROCEDURE — 36415 COLL VENOUS BLD VENIPUNCTURE: CPT

## 2024-10-09 PROCEDURE — 86900 BLOOD TYPING SEROLOGIC ABO: CPT

## 2024-10-09 PROCEDURE — 86901 BLOOD TYPING SEROLOGIC RH(D): CPT

## 2024-10-09 PROCEDURE — 86850 RBC ANTIBODY SCREEN: CPT

## 2024-10-09 RX ORDER — EPINEPHRINE 1 MG/ML
0.3 INJECTION, SOLUTION, CONCENTRATE INTRAVENOUS PRN
Status: CANCELLED | OUTPATIENT
Start: 2024-10-09

## 2024-10-09 RX ORDER — SODIUM CHLORIDE 9 MG/ML
INJECTION, SOLUTION INTRAVENOUS CONTINUOUS
Status: CANCELLED | OUTPATIENT
Start: 2024-10-09

## 2024-10-09 RX ORDER — DIPHENHYDRAMINE HYDROCHLORIDE 50 MG/ML
25 INJECTION INTRAMUSCULAR; INTRAVENOUS ONCE
Status: CANCELLED | OUTPATIENT
Start: 2024-10-09 | End: 2024-10-09

## 2024-10-09 RX ORDER — DIPHENHYDRAMINE HYDROCHLORIDE 50 MG/ML
25 INJECTION INTRAMUSCULAR; INTRAVENOUS ONCE
Status: CANCELLED | OUTPATIENT
Start: 2024-10-10 | End: 2024-10-10

## 2024-10-09 RX ORDER — SODIUM CHLORIDE 0.9 % (FLUSH) 0.9 %
5-40 SYRINGE (ML) INJECTION PRN
Status: CANCELLED | OUTPATIENT
Start: 2024-10-09

## 2024-10-09 RX ORDER — ACETAMINOPHEN 325 MG/1
650 TABLET ORAL ONCE
Status: CANCELLED | OUTPATIENT
Start: 2024-10-10 | End: 2024-10-10

## 2024-10-09 RX ORDER — SODIUM CHLORIDE 9 MG/ML
20 INJECTION, SOLUTION INTRAVENOUS CONTINUOUS
Status: CANCELLED | OUTPATIENT
Start: 2024-10-10

## 2024-10-09 RX ORDER — ALBUTEROL SULFATE 90 UG/1
4 INHALANT RESPIRATORY (INHALATION) PRN
Status: CANCELLED | OUTPATIENT
Start: 2024-10-09

## 2024-10-09 RX ORDER — SODIUM CHLORIDE 9 MG/ML
20 INJECTION, SOLUTION INTRAVENOUS CONTINUOUS
Status: CANCELLED | OUTPATIENT
Start: 2024-10-09

## 2024-10-09 RX ORDER — SODIUM CHLORIDE 0.9 % (FLUSH) 0.9 %
5-40 SYRINGE (ML) INJECTION PRN
Status: CANCELLED | OUTPATIENT
Start: 2024-10-10

## 2024-10-09 RX ORDER — ACETAMINOPHEN 325 MG/1
650 TABLET ORAL ONCE
Status: CANCELLED | OUTPATIENT
Start: 2024-10-09 | End: 2024-10-09

## 2024-10-09 RX ORDER — DIPHENHYDRAMINE HYDROCHLORIDE 50 MG/ML
50 INJECTION INTRAMUSCULAR; INTRAVENOUS
Status: CANCELLED | OUTPATIENT
Start: 2024-10-09

## 2024-10-09 RX ORDER — ONDANSETRON 2 MG/ML
8 INJECTION INTRAMUSCULAR; INTRAVENOUS
Status: CANCELLED | OUTPATIENT
Start: 2024-10-09

## 2024-10-09 RX ORDER — ACETAMINOPHEN 325 MG/1
650 TABLET ORAL
Status: CANCELLED | OUTPATIENT
Start: 2024-10-09

## 2024-10-10 ENCOUNTER — HOSPITAL ENCOUNTER (OUTPATIENT)
Dept: INFUSION THERAPY | Age: 84
Discharge: HOME | End: 2024-10-10
Payer: MEDICARE

## 2024-10-10 VITALS
SYSTOLIC BLOOD PRESSURE: 131 MMHG | RESPIRATION RATE: 16 BRPM | HEART RATE: 90 BPM | OXYGEN SATURATION: 98 % | TEMPERATURE: 98.9 F | DIASTOLIC BLOOD PRESSURE: 61 MMHG

## 2024-10-10 DIAGNOSIS — C92.00 ACUTE MYELOID LEUKEMIA NOT HAVING ACHIEVED REMISSION (HCC): Primary | ICD-10-CM

## 2024-10-10 PROCEDURE — 6360000002 HC RX W HCPCS

## 2024-10-10 PROCEDURE — 36430 TRANSFUSION BLD/BLD COMPNT: CPT

## 2024-10-10 PROCEDURE — 2580000003 HC RX 258

## 2024-10-10 PROCEDURE — 6370000000 HC RX 637 (ALT 250 FOR IP)

## 2024-10-10 RX ORDER — DIPHENHYDRAMINE HYDROCHLORIDE 50 MG/ML
50 INJECTION INTRAMUSCULAR; INTRAVENOUS
Status: CANCELLED | OUTPATIENT
Start: 2024-10-10

## 2024-10-10 RX ORDER — ONDANSETRON 2 MG/ML
8 INJECTION INTRAMUSCULAR; INTRAVENOUS
Status: CANCELLED | OUTPATIENT
Start: 2024-10-10

## 2024-10-10 RX ORDER — DIPHENHYDRAMINE HYDROCHLORIDE 50 MG/ML
50 INJECTION INTRAMUSCULAR; INTRAVENOUS
OUTPATIENT
Start: 2024-10-10

## 2024-10-10 RX ORDER — SODIUM CHLORIDE 9 MG/ML
20 INJECTION, SOLUTION INTRAVENOUS CONTINUOUS
Status: DISCONTINUED | OUTPATIENT
Start: 2024-10-10 | End: 2024-10-11 | Stop reason: HOSPADM

## 2024-10-10 RX ORDER — ACETAMINOPHEN 325 MG/1
650 TABLET ORAL
OUTPATIENT
Start: 2024-10-10

## 2024-10-10 RX ORDER — SODIUM CHLORIDE 9 MG/ML
25 INJECTION, SOLUTION INTRAVENOUS PRN
OUTPATIENT
Start: 2024-10-10

## 2024-10-10 RX ORDER — SODIUM CHLORIDE 0.9 % (FLUSH) 0.9 %
5-40 SYRINGE (ML) INJECTION PRN
Status: CANCELLED | OUTPATIENT
Start: 2024-10-10

## 2024-10-10 RX ORDER — DIPHENHYDRAMINE HYDROCHLORIDE 50 MG/ML
25 INJECTION INTRAMUSCULAR; INTRAVENOUS ONCE
Status: COMPLETED | OUTPATIENT
Start: 2024-10-10 | End: 2024-10-10

## 2024-10-10 RX ORDER — ALBUTEROL SULFATE 90 UG/1
4 INHALANT RESPIRATORY (INHALATION) PRN
Status: CANCELLED | OUTPATIENT
Start: 2024-10-10

## 2024-10-10 RX ORDER — ACETAMINOPHEN 325 MG/1
650 TABLET ORAL ONCE
Status: CANCELLED | OUTPATIENT
Start: 2024-10-10 | End: 2024-10-10

## 2024-10-10 RX ORDER — ACETAMINOPHEN 325 MG/1
650 TABLET ORAL
Status: CANCELLED | OUTPATIENT
Start: 2024-10-10

## 2024-10-10 RX ORDER — SODIUM CHLORIDE 9 MG/ML
INJECTION, SOLUTION INTRAVENOUS CONTINUOUS
Status: CANCELLED | OUTPATIENT
Start: 2024-10-10

## 2024-10-10 RX ORDER — EPINEPHRINE 1 MG/ML
0.3 INJECTION, SOLUTION, CONCENTRATE INTRAVENOUS PRN
OUTPATIENT
Start: 2024-10-10

## 2024-10-10 RX ORDER — SODIUM CHLORIDE 9 MG/ML
INJECTION, SOLUTION INTRAVENOUS CONTINUOUS
OUTPATIENT
Start: 2024-10-10

## 2024-10-10 RX ORDER — ONDANSETRON 2 MG/ML
8 INJECTION INTRAMUSCULAR; INTRAVENOUS
OUTPATIENT
Start: 2024-10-10

## 2024-10-10 RX ORDER — EPINEPHRINE 1 MG/ML
0.3 INJECTION, SOLUTION, CONCENTRATE INTRAVENOUS PRN
Status: CANCELLED | OUTPATIENT
Start: 2024-10-10

## 2024-10-10 RX ORDER — ALBUTEROL SULFATE 90 UG/1
4 INHALANT RESPIRATORY (INHALATION) PRN
OUTPATIENT
Start: 2024-10-10

## 2024-10-10 RX ORDER — SODIUM CHLORIDE 0.9 % (FLUSH) 0.9 %
5-40 SYRINGE (ML) INJECTION PRN
Status: DISCONTINUED | OUTPATIENT
Start: 2024-10-10 | End: 2024-10-11 | Stop reason: HOSPADM

## 2024-10-10 RX ORDER — SODIUM CHLORIDE 9 MG/ML
20 INJECTION, SOLUTION INTRAVENOUS CONTINUOUS
Status: CANCELLED | OUTPATIENT
Start: 2024-10-10

## 2024-10-10 RX ORDER — ACETAMINOPHEN 325 MG/1
650 TABLET ORAL ONCE
Status: COMPLETED | OUTPATIENT
Start: 2024-10-10 | End: 2024-10-10

## 2024-10-10 RX ORDER — SODIUM CHLORIDE 9 MG/ML
25 INJECTION, SOLUTION INTRAVENOUS PRN
Status: CANCELLED | OUTPATIENT
Start: 2024-10-10

## 2024-10-10 RX ORDER — DIPHENHYDRAMINE HYDROCHLORIDE 50 MG/ML
25 INJECTION INTRAMUSCULAR; INTRAVENOUS ONCE
Status: CANCELLED | OUTPATIENT
Start: 2024-10-10 | End: 2024-10-10

## 2024-10-10 RX ADMIN — SODIUM CHLORIDE, PRESERVATIVE FREE 10 ML: 5 INJECTION INTRAVENOUS at 13:29

## 2024-10-10 RX ADMIN — ACETAMINOPHEN 650 MG: 325 TABLET ORAL at 13:26

## 2024-10-10 RX ADMIN — DIPHENHYDRAMINE HYDROCHLORIDE 25 MG: 50 INJECTION INTRAMUSCULAR; INTRAVENOUS at 13:29

## 2024-10-10 RX ADMIN — SODIUM CHLORIDE, PRESERVATIVE FREE 10 ML: 5 INJECTION INTRAVENOUS at 13:24

## 2024-10-10 RX ADMIN — SODIUM CHLORIDE 20 ML/HR: 9 INJECTION, SOLUTION INTRAVENOUS at 13:24

## 2024-10-10 NOTE — FLOWSHEET NOTE
Patient tolerated transfusion well. Patient alert and oriented x 3 No distress noted. Vital signs stable. Patient denies any new or worsening pain. Patient denies questions regarding treatment or medication; verbalized understanding, offered patient information and discharge material; patient declined; Patient denies needs, all questions answered.

## 2024-10-11 ENCOUNTER — OFFICE VISIT (OUTPATIENT)
Dept: GERIATRIC MEDICINE | Age: 84
End: 2024-10-11
Payer: COMMERCIAL

## 2024-10-11 VITALS
SYSTOLIC BLOOD PRESSURE: 122 MMHG | DIASTOLIC BLOOD PRESSURE: 70 MMHG | BODY MASS INDEX: 17.91 KG/M2 | WEIGHT: 127.9 LBS | HEART RATE: 74 BPM | OXYGEN SATURATION: 96 % | TEMPERATURE: 97.7 F

## 2024-10-11 DIAGNOSIS — E03.9 ACQUIRED HYPOTHYROIDISM: Primary | ICD-10-CM

## 2024-10-11 LAB
ABO/RH: NORMAL
ANTIBODY SCREEN: NEGATIVE
ARM BAND NUMBER: NORMAL
BLOOD BANK BLOOD PRODUCT EXPIRATION DATE: NORMAL
BLOOD BANK DISPENSE STATUS: NORMAL
BLOOD BANK ISBT PRODUCT BLOOD TYPE: 7300
BLOOD BANK PRODUCT CODE: NORMAL
BLOOD BANK SAMPLE EXPIRATION: NORMAL
BLOOD BANK UNIT TYPE AND RH: NORMAL
BPU ID: NORMAL
COMPONENT: NORMAL
CROSSMATCH RESULT: NORMAL
TRANSFUSION STATUS: NORMAL
UNIT DIVISION: 0
UNIT ISSUE DATE/TIME: NORMAL

## 2024-10-11 PROCEDURE — 1036F TOBACCO NON-USER: CPT | Performed by: INTERNAL MEDICINE

## 2024-10-11 PROCEDURE — 99213 OFFICE O/P EST LOW 20 MIN: CPT | Performed by: INTERNAL MEDICINE

## 2024-10-11 PROCEDURE — 1123F ACP DISCUSS/DSCN MKR DOCD: CPT | Performed by: INTERNAL MEDICINE

## 2024-10-11 PROCEDURE — G8419 CALC BMI OUT NRM PARAM NOF/U: HCPCS | Performed by: INTERNAL MEDICINE

## 2024-10-11 PROCEDURE — G8484 FLU IMMUNIZE NO ADMIN: HCPCS | Performed by: INTERNAL MEDICINE

## 2024-10-11 PROCEDURE — G8427 DOCREV CUR MEDS BY ELIG CLIN: HCPCS | Performed by: INTERNAL MEDICINE

## 2024-10-11 RX ORDER — LEVOTHYROXINE SODIUM 100 UG/1
100 TABLET ORAL DAILY
Qty: 30 TABLET | Refills: 3 | Status: SHIPPED | OUTPATIENT
Start: 2024-10-11

## 2024-10-11 NOTE — PROGRESS NOTES
CC Failure to thrive    Weight low and stable for the last month and eating better at son's house  VS stable   Looks cachectic  TSH >70  Cognitive stable  Mobility good  Off BP meds   Bad lower teeth and too high risk for their removal   Impression:FTT, Depression and Hypothyroidism                     Myelofibrosis    Plan; Improve caloric intake despite bad teeth           T4 to 100 mcg daily

## 2024-12-04 ENCOUNTER — OFFICE VISIT (OUTPATIENT)
Dept: GERIATRIC MEDICINE | Age: 84
End: 2024-12-04
Payer: COMMERCIAL

## 2024-12-04 VITALS
HEART RATE: 80 BPM | RESPIRATION RATE: 16 BRPM | OXYGEN SATURATION: 99 % | TEMPERATURE: 97.3 F | SYSTOLIC BLOOD PRESSURE: 134 MMHG | WEIGHT: 125.2 LBS | BODY MASS INDEX: 17.53 KG/M2 | DIASTOLIC BLOOD PRESSURE: 66 MMHG

## 2024-12-04 DIAGNOSIS — E03.8 OTHER SPECIFIED HYPOTHYROIDISM: Primary | ICD-10-CM

## 2024-12-04 PROCEDURE — 1160F RVW MEDS BY RX/DR IN RCRD: CPT | Performed by: INTERNAL MEDICINE

## 2024-12-04 PROCEDURE — 1159F MED LIST DOCD IN RCRD: CPT | Performed by: INTERNAL MEDICINE

## 2024-12-04 PROCEDURE — G8484 FLU IMMUNIZE NO ADMIN: HCPCS | Performed by: INTERNAL MEDICINE

## 2024-12-04 PROCEDURE — 1036F TOBACCO NON-USER: CPT | Performed by: INTERNAL MEDICINE

## 2024-12-04 PROCEDURE — 1123F ACP DISCUSS/DSCN MKR DOCD: CPT | Performed by: INTERNAL MEDICINE

## 2024-12-04 PROCEDURE — 99213 OFFICE O/P EST LOW 20 MIN: CPT | Performed by: INTERNAL MEDICINE

## 2024-12-04 PROCEDURE — G8419 CALC BMI OUT NRM PARAM NOF/U: HCPCS | Performed by: INTERNAL MEDICINE

## 2024-12-04 PROCEDURE — G8427 DOCREV CUR MEDS BY ELIG CLIN: HCPCS | Performed by: INTERNAL MEDICINE

## 2024-12-04 RX ORDER — FEDRATINIB HYDROCHLORIDE 100 MG/1
200 CAPSULE ORAL DAILY
COMMUNITY

## 2024-12-04 RX ORDER — THIAMINE MONONITRATE (VIT B1) 100 MG
100 TABLET ORAL DAILY
COMMUNITY

## 2024-12-04 RX ORDER — LIDOCAINE HYDROCHLORIDE 20 MG/ML
15 SOLUTION OROPHARYNGEAL PRN
Qty: 1 EACH | Refills: 0 | Status: SHIPPED
Start: 2024-12-04 | End: 2024-12-04

## 2024-12-04 RX ORDER — LIDOCAINE HYDROCHLORIDE 20 MG/ML
15 SOLUTION OROPHARYNGEAL PRN
Qty: 1 EACH | Refills: 1 | Status: SHIPPED | OUTPATIENT
Start: 2024-12-04

## 2024-12-04 NOTE — PROGRESS NOTES
Chief Complaint   Patient presents with    Follow-up     October follow up.  Here with son, Kirill.    Blood Pressure Check     144/60.  Repeat 134/66.

## 2024-12-04 NOTE — PROGRESS NOTES
Cc WEIGHT LOSS     Here for followup, down 2 pounds  Will eat sweets  But teeth in mandible all hurt with chewing  High risk dental Sx  With Myelofibrosis  Occasional blood transfusion  Impression: Depression and Myelofibrosis                       Bad teeth                      Hypothyroidism  Plan; Recheck TSH and Viscous xylocaine trial           Mirtazepine 7.5 mg hs

## 2024-12-05 ENCOUNTER — TELEPHONE (OUTPATIENT)
Dept: GERIATRIC MEDICINE | Age: 84
End: 2024-12-05

## 2024-12-05 NOTE — TELEPHONE ENCOUNTER
Faxed lab orders as requested by patient  to [facility] ordered by Dr Crum on 12/4. Spoke with Veronica at the [facility] and she stated they were received and given to the lab.     Fax # [phone]

## 2024-12-13 ENCOUNTER — TELEPHONE (OUTPATIENT)
Dept: GERIATRIC MEDICINE | Age: 84
End: 2024-12-13

## 2024-12-13 RX ORDER — MIRTAZAPINE 7.5 MG/1
7.5 TABLET, FILM COATED ORAL NIGHTLY
Qty: 90 TABLET | Refills: 3 | Status: SHIPPED | OUTPATIENT
Start: 2024-12-13

## 2024-12-13 NOTE — TELEPHONE ENCOUNTER
Patients son called requesting a new script be sent for Mirtazapine. Per patients son the patient is taking 7.5 mg nightly. Please send new script to [facility] in [place] on [address].     Please update med list remove 15 mg and order 7.5 mg.

## 2025-02-05 DIAGNOSIS — E03.9 ACQUIRED HYPOTHYROIDISM: Primary | ICD-10-CM

## 2025-02-07 LAB — TSH SERPL DL<=0.05 MIU/L-ACNC: NORMAL M[IU]/L

## 2025-02-21 DIAGNOSIS — E03.9 ACQUIRED HYPOTHYROIDISM: ICD-10-CM

## 2025-04-08 ENCOUNTER — OFFICE VISIT (OUTPATIENT)
Dept: GERIATRIC MEDICINE | Age: 85
End: 2025-04-08
Payer: COMMERCIAL

## 2025-04-08 VITALS
HEART RATE: 63 BPM | BODY MASS INDEX: 18.65 KG/M2 | DIASTOLIC BLOOD PRESSURE: 64 MMHG | TEMPERATURE: 98.9 F | SYSTOLIC BLOOD PRESSURE: 118 MMHG | OXYGEN SATURATION: 98 % | WEIGHT: 133.2 LBS

## 2025-04-08 DIAGNOSIS — E03.9 HYPOTHYROIDISM, UNSPECIFIED TYPE: Primary | ICD-10-CM

## 2025-04-08 PROCEDURE — G8420 CALC BMI NORM PARAMETERS: HCPCS | Performed by: INTERNAL MEDICINE

## 2025-04-08 PROCEDURE — 99213 OFFICE O/P EST LOW 20 MIN: CPT | Performed by: INTERNAL MEDICINE

## 2025-04-08 PROCEDURE — 1123F ACP DISCUSS/DSCN MKR DOCD: CPT | Performed by: INTERNAL MEDICINE

## 2025-04-08 PROCEDURE — G8427 DOCREV CUR MEDS BY ELIG CLIN: HCPCS | Performed by: INTERNAL MEDICINE

## 2025-04-08 PROCEDURE — 1159F MED LIST DOCD IN RCRD: CPT | Performed by: INTERNAL MEDICINE

## 2025-04-08 PROCEDURE — 1036F TOBACCO NON-USER: CPT | Performed by: INTERNAL MEDICINE

## 2025-04-08 RX ORDER — MIRTAZAPINE 15 MG/1
15 TABLET, FILM COATED ORAL NIGHTLY
Qty: 90 TABLET | Refills: 1 | Status: SHIPPED | OUTPATIENT
Start: 2025-04-08

## 2025-04-08 ASSESSMENT — PATIENT HEALTH QUESTIONNAIRE - PHQ9
2. FEELING DOWN, DEPRESSED OR HOPELESS: NOT AT ALL
SUM OF ALL RESPONSES TO PHQ QUESTIONS 1-9: 0
1. LITTLE INTEREST OR PLEASURE IN DOING THINGS: NOT AT ALL
SUM OF ALL RESPONSES TO PHQ QUESTIONS 1-9: 0

## 2025-04-08 NOTE — PROGRESS NOTES
CC Reevaluate condition    Here with son  And [place]  Has loose mandibular sockets, and loose teeth   And hurts to eat  Lidocaine no help  Hb at 8 to 9  Platelets 40 to 50 and bleeds easy and profusely at dental office  TSH??  Appetite poor but better with encouragement  Impression; Myelofibrosis                      Hypersplenism                      Blasts on  CBC                       Depression and FTT annd poor appete\ite                      Hypothyroidism   Plan' TSH            Miirtazepine to 15 mg hs

## 2025-04-12 NOTE — PROGRESS NOTES
Mookie Zhou  Return Patient    Chief Complaint   Patient presents with    Foot Pain    Callmoise Soria MD  4/18/2023       Subjective: This Amanda Part comes to office for foot and nail care. Pt currently has complaint of thickened, painful, elongated nails that he/she cannot manage by themselves. Pt. Relates pain to nails with shoe gear. Pt's primary care physician is Mable Soria MD.  Past Medical History:   Diagnosis Date    Hyperlipidemia     Hypertension        No Known Allergies  Current Outpatient Medications on File Prior to Visit   Medication Sig Dispense Refill    ammonium lactate (LAC-HYDRIN) 12 % cream Apply topically as needed. 385 g 2    VONJO 100 MG CAPS       atorvastatin (LIPITOR) 20 MG tablet Take 1 tablet by mouth daily      lisinopril (PRINIVIL;ZESTRIL) 10 MG tablet Take 1 tablet by mouth daily      Icosapent Ethyl (VASCEPA) 1 g CAPS capsule Take 2 capsules by mouth      Cholecalciferol 50 MCG (2000 UT) CAPS Take 2,000 Units by mouth daily      magnesium (MAGNESIUM-OXIDE) 250 MG TABS tablet Take 1 tablet by mouth daily      coenzyme Q10 200 MG CAPS capsule Take 1 capsule by mouth daily      aspirin 81 MG tablet Take 1 tablet by mouth daily       No current facility-administered medications on file prior to visit. Review of Systems  Objective:  General: AAO x 3 in NAD.     Derm  Toenail Description  Sites of Onychomycosis Involvement (Check affected area)  [x] [x] [x] [x] [x] [x] [x] [x] [x] [x]  5 4 3 2 1 1 2 3 4 5                          Right                                        Left    Thickness  [x] [x] [x] [x] [x] [x] [x] [x] [x] [x]  5 4 3 2 1 1 2 3 4 5                         Right                                        Left    Dystrophic Changes   [x] [x] [x] [x] [x] [x] [x] [x] [x] [x]  5 4 3 2 1 1 2 3 4 5                         Right Previously was using Mccabe for retention, until getting TURP 2024  Since then states he had no problems with urination while on his tamsulosin  States current difficulty urinating but could be due to pain from UTI, vs retention again  Constipation can also contribute to retention    Bladder scan. Not retaining.  Continue home tamsulosin  If retaining consider cath/Mccabe  Address constipation as above

## 2025-08-10 ENCOUNTER — APPOINTMENT (OUTPATIENT)
Dept: CT IMAGING | Age: 85
DRG: 871 | End: 2025-08-10
Payer: COMMERCIAL

## 2025-08-10 ENCOUNTER — APPOINTMENT (OUTPATIENT)
Dept: GENERAL RADIOLOGY | Age: 85
DRG: 871 | End: 2025-08-10
Payer: COMMERCIAL

## 2025-08-10 ENCOUNTER — HOSPITAL ENCOUNTER (INPATIENT)
Age: 85
LOS: 16 days | Discharge: NURSING, REHAB OR LONG-TERM CARE | DRG: 871 | End: 2025-08-26
Attending: STUDENT IN AN ORGANIZED HEALTH CARE EDUCATION/TRAINING PROGRAM | Admitting: INTERNAL MEDICINE
Payer: COMMERCIAL

## 2025-08-10 DIAGNOSIS — N17.9 AKI (ACUTE KIDNEY INJURY): Primary | ICD-10-CM

## 2025-08-10 DIAGNOSIS — N17.9 ACUTE KIDNEY INJURY: ICD-10-CM

## 2025-08-10 DIAGNOSIS — I47.29 NSVT (NONSUSTAINED VENTRICULAR TACHYCARDIA) (HCC): ICD-10-CM

## 2025-08-10 DIAGNOSIS — R16.1 SPLENOMEGALY: ICD-10-CM

## 2025-08-10 DIAGNOSIS — R29.6 FREQUENT FALLS: ICD-10-CM

## 2025-08-10 LAB
ALBUMIN SERPL-MCNC: 3.5 G/DL (ref 3.5–5.2)
ALP SERPL-CCNC: 75 U/L (ref 40–129)
ALT SERPL-CCNC: 14 U/L (ref 0–50)
AMORPH SED URNS QL MICRO: PRESENT
ANION GAP SERPL CALCULATED.3IONS-SCNC: 19 MMOL/L (ref 7–16)
AST SERPL-CCNC: 28 U/L (ref 0–50)
BACTERIA URNS QL MICRO: ABNORMAL
BASOPHILS # BLD: 0 K/UL (ref 0–0.2)
BASOPHILS NFR BLD: 0 % (ref 0–2)
BILIRUB SERPL-MCNC: 0.8 MG/DL (ref 0–1.2)
BILIRUB UR QL STRIP: NEGATIVE
BLASTS ABSOLUTE COUNT: 0.09 K/UL
BLASTS: 2 %
BNP SERPL-MCNC: 3000 PG/ML (ref 0–450)
BUN SERPL-MCNC: 85 MG/DL (ref 8–23)
CALCIUM SERPL-MCNC: 8.7 MG/DL (ref 8.8–10.2)
CHLORIDE SERPL-SCNC: 95 MMOL/L (ref 98–107)
CLARITY UR: ABNORMAL
CO2 SERPL-SCNC: 15 MMOL/L (ref 22–29)
COLOR UR: YELLOW
CREAT SERPL-MCNC: 3.8 MG/DL (ref 0.7–1.2)
CRYSTALS URNS MICRO: ABNORMAL /HPF
EOSINOPHIL # BLD: 0 K/UL (ref 0.05–0.5)
EOSINOPHILS RELATIVE PERCENT: 0 % (ref 0–6)
EPI CELLS #/AREA URNS HPF: ABNORMAL /HPF
ERYTHROCYTE [DISTWIDTH] IN BLOOD BY AUTOMATED COUNT: 15.9 % (ref 11.5–15)
GFR, ESTIMATED: 15 ML/MIN/1.73M2
GLUCOSE SERPL-MCNC: 106 MG/DL (ref 74–99)
GLUCOSE UR STRIP-MCNC: NEGATIVE MG/DL
HCT VFR BLD AUTO: 21.9 % (ref 37–54)
HGB BLD-MCNC: 7.4 G/DL (ref 12.5–16.5)
HGB UR QL STRIP.AUTO: ABNORMAL
KETONES UR STRIP-MCNC: NEGATIVE MG/DL
LACTATE BLDV-SCNC: 1.1 MMOL/L (ref 0.5–2.2)
LEUKOCYTE ESTERASE UR QL STRIP: ABNORMAL
LYMPHOCYTES NFR BLD: 2.07 K/UL (ref 1.5–4)
LYMPHOCYTES RELATIVE PERCENT: 45 % (ref 20–42)
MCH RBC QN AUTO: 27.9 PG (ref 26–35)
MCHC RBC AUTO-ENTMCNC: 33.8 G/DL (ref 32–34.5)
MCV RBC AUTO: 82.6 FL (ref 80–99.9)
MONOCYTES NFR BLD: 0.14 K/UL (ref 0.1–0.95)
MONOCYTES NFR BLD: 3 % (ref 2–12)
MUCOUS THREADS URNS QL MICRO: PRESENT
NEUTROPHILS NFR BLD: 50 % (ref 43–80)
NEUTS SEG NFR BLD: 2.3 K/UL (ref 1.8–7.3)
NITRITE UR QL STRIP: NEGATIVE
PH UR STRIP: >9 [PH] (ref 5–8)
PLATELET CONFIRMATION: NORMAL
PLATELET, FLUORESCENCE: 50 K/UL (ref 130–450)
PMV BLD AUTO: ABNORMAL FL (ref 7–12)
POTASSIUM SERPL-SCNC: 5 MMOL/L (ref 3.5–5.1)
PROT SERPL-MCNC: 6.3 G/DL (ref 6.4–8.3)
PROT UR STRIP-MCNC: 100 MG/DL
RBC # BLD AUTO: 2.65 M/UL (ref 3.8–5.8)
RBC # BLD: ABNORMAL 10*6/UL
RBC #/AREA URNS HPF: ABNORMAL /HPF
SODIUM SERPL-SCNC: 128 MMOL/L (ref 136–145)
SP GR UR STRIP: <1.005 (ref 1–1.03)
TROPONIN I SERPL HS-MCNC: 66 NG/L (ref 0–22)
TROPONIN I SERPL HS-MCNC: 74 NG/L (ref 0–22)
UROBILINOGEN UR STRIP-ACNC: 0.2 EU/DL (ref 0–1)
WBC # BLD: ABNORMAL 10*3/UL
WBC #/AREA URNS HPF: ABNORMAL /HPF
WBC OTHER # BLD: 4.6 K/UL (ref 4.5–11.5)

## 2025-08-10 PROCEDURE — 2580000003 HC RX 258

## 2025-08-10 PROCEDURE — 87086 URINE CULTURE/COLONY COUNT: CPT

## 2025-08-10 PROCEDURE — 85025 COMPLETE CBC W/AUTO DIFF WBC: CPT

## 2025-08-10 PROCEDURE — 1200000000 HC SEMI PRIVATE

## 2025-08-10 PROCEDURE — 83880 ASSAY OF NATRIURETIC PEPTIDE: CPT

## 2025-08-10 PROCEDURE — 99285 EMERGENCY DEPT VISIT HI MDM: CPT

## 2025-08-10 PROCEDURE — 81001 URINALYSIS AUTO W/SCOPE: CPT

## 2025-08-10 PROCEDURE — 84484 ASSAY OF TROPONIN QUANT: CPT

## 2025-08-10 PROCEDURE — 80053 COMPREHEN METABOLIC PANEL: CPT

## 2025-08-10 PROCEDURE — 83605 ASSAY OF LACTIC ACID: CPT

## 2025-08-10 PROCEDURE — 74176 CT ABD & PELVIS W/O CONTRAST: CPT

## 2025-08-10 PROCEDURE — 6370000000 HC RX 637 (ALT 250 FOR IP): Performed by: INTERNAL MEDICINE

## 2025-08-10 PROCEDURE — 71045 X-RAY EXAM CHEST 1 VIEW: CPT

## 2025-08-10 PROCEDURE — 2580000003 HC RX 258: Performed by: INTERNAL MEDICINE

## 2025-08-10 PROCEDURE — 93005 ELECTROCARDIOGRAM TRACING: CPT

## 2025-08-10 RX ORDER — POLYETHYLENE GLYCOL 3350 17 G/17G
17 POWDER, FOR SOLUTION ORAL DAILY PRN
Status: DISCONTINUED | OUTPATIENT
Start: 2025-08-10 | End: 2025-08-26 | Stop reason: HOSPADM

## 2025-08-10 RX ORDER — ASPIRIN 81 MG/1
81 TABLET ORAL DAILY
Status: DISCONTINUED | OUTPATIENT
Start: 2025-08-10 | End: 2025-08-13

## 2025-08-10 RX ORDER — ONDANSETRON 2 MG/ML
4 INJECTION INTRAMUSCULAR; INTRAVENOUS EVERY 6 HOURS PRN
Status: DISCONTINUED | OUTPATIENT
Start: 2025-08-10 | End: 2025-08-26 | Stop reason: HOSPADM

## 2025-08-10 RX ORDER — SODIUM CHLORIDE 0.9 % (FLUSH) 0.9 %
5-40 SYRINGE (ML) INJECTION EVERY 12 HOURS SCHEDULED
Status: DISCONTINUED | OUTPATIENT
Start: 2025-08-10 | End: 2025-08-26 | Stop reason: HOSPADM

## 2025-08-10 RX ORDER — ONDANSETRON 4 MG/1
4 TABLET, ORALLY DISINTEGRATING ORAL EVERY 8 HOURS PRN
Status: DISCONTINUED | OUTPATIENT
Start: 2025-08-10 | End: 2025-08-26 | Stop reason: HOSPADM

## 2025-08-10 RX ORDER — SODIUM CHLORIDE 9 MG/ML
INJECTION, SOLUTION INTRAVENOUS PRN
Status: DISCONTINUED | OUTPATIENT
Start: 2025-08-10 | End: 2025-08-26 | Stop reason: HOSPADM

## 2025-08-10 RX ORDER — ACETAMINOPHEN 650 MG/1
650 SUPPOSITORY RECTAL EVERY 6 HOURS PRN
Status: DISCONTINUED | OUTPATIENT
Start: 2025-08-10 | End: 2025-08-26 | Stop reason: HOSPADM

## 2025-08-10 RX ORDER — CHOLECALCIFEROL (VITAMIN D3) 50 MCG
2000 TABLET ORAL DAILY
Status: DISCONTINUED | OUTPATIENT
Start: 2025-08-11 | End: 2025-08-26 | Stop reason: HOSPADM

## 2025-08-10 RX ORDER — LEVOTHYROXINE SODIUM 100 UG/1
100 TABLET ORAL DAILY
Status: DISCONTINUED | OUTPATIENT
Start: 2025-08-11 | End: 2025-08-21

## 2025-08-10 RX ORDER — SODIUM CHLORIDE 0.9 % (FLUSH) 0.9 %
5-40 SYRINGE (ML) INJECTION PRN
Status: DISCONTINUED | OUTPATIENT
Start: 2025-08-10 | End: 2025-08-26 | Stop reason: HOSPADM

## 2025-08-10 RX ORDER — MIRTAZAPINE 15 MG/1
15 TABLET, FILM COATED ORAL NIGHTLY
Status: DISCONTINUED | OUTPATIENT
Start: 2025-08-10 | End: 2025-08-17

## 2025-08-10 RX ORDER — ALLOPURINOL 100 MG/1
100 TABLET ORAL DAILY
Status: DISCONTINUED | OUTPATIENT
Start: 2025-08-10 | End: 2025-08-26 | Stop reason: HOSPADM

## 2025-08-10 RX ORDER — 0.9 % SODIUM CHLORIDE 0.9 %
1000 INTRAVENOUS SOLUTION INTRAVENOUS ONCE
Status: COMPLETED | OUTPATIENT
Start: 2025-08-10 | End: 2025-08-10

## 2025-08-10 RX ORDER — ACETAMINOPHEN 325 MG/1
650 TABLET ORAL EVERY 6 HOURS PRN
Status: DISCONTINUED | OUTPATIENT
Start: 2025-08-10 | End: 2025-08-26 | Stop reason: HOSPADM

## 2025-08-10 RX ORDER — HEPARIN SODIUM 5000 [USP'U]/ML
5000 INJECTION, SOLUTION INTRAVENOUS; SUBCUTANEOUS EVERY 8 HOURS SCHEDULED
Status: DISCONTINUED | OUTPATIENT
Start: 2025-08-10 | End: 2025-08-26 | Stop reason: HOSPADM

## 2025-08-10 RX ORDER — LANOLIN ALCOHOL/MO/W.PET/CERES
100 CREAM (GRAM) TOPICAL DAILY
Status: DISCONTINUED | OUTPATIENT
Start: 2025-08-11 | End: 2025-08-26 | Stop reason: HOSPADM

## 2025-08-10 RX ORDER — SODIUM CHLORIDE 9 MG/ML
INJECTION, SOLUTION INTRAVENOUS CONTINUOUS
Status: DISCONTINUED | OUTPATIENT
Start: 2025-08-10 | End: 2025-08-11

## 2025-08-10 RX ADMIN — SODIUM CHLORIDE 1000 ML: 0.9 INJECTION, SOLUTION INTRAVENOUS at 10:37

## 2025-08-10 RX ADMIN — SODIUM CHLORIDE: 0.9 INJECTION, SOLUTION INTRAVENOUS at 18:06

## 2025-08-10 RX ADMIN — MIRTAZAPINE 15 MG: 15 TABLET, FILM COATED ORAL at 21:43

## 2025-08-10 ASSESSMENT — PAIN SCALES - GENERAL
PAINLEVEL_OUTOF10: 0

## 2025-08-10 ASSESSMENT — PAIN - FUNCTIONAL ASSESSMENT: PAIN_FUNCTIONAL_ASSESSMENT: 0-10

## 2025-08-11 PROBLEM — E43 SEVERE PROTEIN-CALORIE MALNUTRITION: Chronic | Status: ACTIVE | Noted: 2025-08-11

## 2025-08-11 LAB
ALBUMIN SERPL-MCNC: 3 G/DL (ref 3.5–5.2)
ALP SERPL-CCNC: 62 U/L (ref 40–129)
ALT SERPL-CCNC: 12 U/L (ref 0–50)
ANION GAP SERPL CALCULATED.3IONS-SCNC: 16 MMOL/L (ref 7–16)
AST SERPL-CCNC: 25 U/L (ref 0–50)
BILIRUB SERPL-MCNC: 0.5 MG/DL (ref 0–1.2)
BUN SERPL-MCNC: 75 MG/DL (ref 8–23)
CALCIUM SERPL-MCNC: 8 MG/DL (ref 8.8–10.2)
CHLORIDE SERPL-SCNC: 100 MMOL/L (ref 98–107)
CHLORIDE UR-SCNC: 58 MMOL/L
CO2 SERPL-SCNC: 13 MMOL/L (ref 22–29)
CREAT SERPL-MCNC: 3.3 MG/DL (ref 0.7–1.2)
CREAT UR-MCNC: 39.7 MG/DL (ref 40–278)
CREAT UR-MCNC: 39.8 MG/DL (ref 40–278)
EKG ATRIAL RATE: 115 BPM
EKG P AXIS: 62 DEGREES
EKG P-R INTERVAL: 168 MS
EKG Q-T INTERVAL: 320 MS
EKG QRS DURATION: 70 MS
EKG QTC CALCULATION (BAZETT): 442 MS
EKG R AXIS: 6 DEGREES
EKG T AXIS: 52 DEGREES
EKG VENTRICULAR RATE: 115 BPM
ERYTHROCYTE [DISTWIDTH] IN BLOOD BY AUTOMATED COUNT: 15.8 % (ref 11.5–15)
GFR, ESTIMATED: 18 ML/MIN/1.73M2
GLUCOSE SERPL-MCNC: 97 MG/DL (ref 74–99)
HCT VFR BLD AUTO: 16.8 % (ref 37–54)
HCT VFR BLD AUTO: 17.1 % (ref 37–54)
HCT VFR BLD AUTO: 20.8 % (ref 37–54)
HGB BLD-MCNC: 5.5 G/DL (ref 12.5–16.5)
HGB BLD-MCNC: 5.9 G/DL (ref 12.5–16.5)
HGB BLD-MCNC: 7.3 G/DL (ref 12.5–16.5)
MCH RBC QN AUTO: 27.2 PG (ref 26–35)
MCHC RBC AUTO-ENTMCNC: 32.7 G/DL (ref 32–34.5)
MCV RBC AUTO: 83.2 FL (ref 80–99.9)
PLATELET # BLD AUTO: 36 K/UL (ref 130–450)
PLATELET CONFIRMATION: NORMAL
PMV BLD AUTO: 11.3 FL (ref 7–12)
POTASSIUM SERPL-SCNC: 4.2 MMOL/L (ref 3.5–5.1)
PROT SERPL-MCNC: 5.2 G/DL (ref 6.4–8.3)
RBC # BLD AUTO: 2.02 M/UL (ref 3.8–5.8)
SODIUM SERPL-SCNC: 130 MMOL/L (ref 136–145)
SODIUM UR-SCNC: 45 MMOL/L
TOTAL PROTEIN, URINE: 44 MG/DL (ref 0–12)
TOTAL PROTEIN, URINE: 44 MG/DL (ref 0–12)
URINE TOTAL PROTEIN CREATININE RATIO: 1.11 (ref 0–0.2)
WBC OTHER # BLD: 2 K/UL (ref 4.5–11.5)

## 2025-08-11 PROCEDURE — 2500000003 HC RX 250 WO HCPCS

## 2025-08-11 PROCEDURE — P9016 RBC LEUKOCYTES REDUCED: HCPCS

## 2025-08-11 PROCEDURE — 86850 RBC ANTIBODY SCREEN: CPT

## 2025-08-11 PROCEDURE — 86923 COMPATIBILITY TEST ELECTRIC: CPT

## 2025-08-11 PROCEDURE — 86900 BLOOD TYPING SEROLOGIC ABO: CPT

## 2025-08-11 PROCEDURE — 36430 TRANSFUSION BLD/BLD COMPNT: CPT

## 2025-08-11 PROCEDURE — 2580000003 HC RX 258: Performed by: INTERNAL MEDICINE

## 2025-08-11 PROCEDURE — 1200000000 HC SEMI PRIVATE

## 2025-08-11 PROCEDURE — 84156 ASSAY OF PROTEIN URINE: CPT

## 2025-08-11 PROCEDURE — 85014 HEMATOCRIT: CPT

## 2025-08-11 PROCEDURE — 84300 ASSAY OF URINE SODIUM: CPT

## 2025-08-11 PROCEDURE — 80053 COMPREHEN METABOLIC PANEL: CPT

## 2025-08-11 PROCEDURE — 82436 ASSAY OF URINE CHLORIDE: CPT

## 2025-08-11 PROCEDURE — 2580000003 HC RX 258

## 2025-08-11 PROCEDURE — 85018 HEMOGLOBIN: CPT

## 2025-08-11 PROCEDURE — 85027 COMPLETE CBC AUTOMATED: CPT

## 2025-08-11 PROCEDURE — 82570 ASSAY OF URINE CREATININE: CPT

## 2025-08-11 PROCEDURE — 93010 ELECTROCARDIOGRAM REPORT: CPT | Performed by: INTERNAL MEDICINE

## 2025-08-11 PROCEDURE — 2500000003 HC RX 250 WO HCPCS: Performed by: INTERNAL MEDICINE

## 2025-08-11 PROCEDURE — 30233N1 TRANSFUSION OF NONAUTOLOGOUS RED BLOOD CELLS INTO PERIPHERAL VEIN, PERCUTANEOUS APPROACH: ICD-10-PCS | Performed by: INTERNAL MEDICINE

## 2025-08-11 PROCEDURE — 86901 BLOOD TYPING SEROLOGIC RH(D): CPT

## 2025-08-11 PROCEDURE — 6370000000 HC RX 637 (ALT 250 FOR IP): Performed by: INTERNAL MEDICINE

## 2025-08-11 RX ORDER — SODIUM CHLORIDE 9 MG/ML
INJECTION, SOLUTION INTRAVENOUS PRN
Status: DISCONTINUED | OUTPATIENT
Start: 2025-08-11 | End: 2025-08-22

## 2025-08-11 RX ADMIN — LEVOTHYROXINE SODIUM 100 MCG: 0.1 TABLET ORAL at 05:34

## 2025-08-11 RX ADMIN — SODIUM CHLORIDE: 0.9 INJECTION, SOLUTION INTRAVENOUS at 02:34

## 2025-08-11 RX ADMIN — Medication: at 20:12

## 2025-08-11 RX ADMIN — ASPIRIN 81 MG: 81 TABLET, COATED ORAL at 07:44

## 2025-08-11 RX ADMIN — Medication: at 10:41

## 2025-08-11 RX ADMIN — Medication 2000 UNITS: at 07:44

## 2025-08-11 RX ADMIN — SODIUM CHLORIDE, PRESERVATIVE FREE 10 ML: 5 INJECTION INTRAVENOUS at 20:12

## 2025-08-11 RX ADMIN — ALLOPURINOL 100 MG: 100 TABLET ORAL at 07:44

## 2025-08-11 RX ADMIN — Medication 100 MG: at 07:44

## 2025-08-11 RX ADMIN — MIRTAZAPINE 15 MG: 15 TABLET, FILM COATED ORAL at 20:11

## 2025-08-11 ASSESSMENT — PAIN SCALES - GENERAL: PAINLEVEL_OUTOF10: 0

## 2025-08-12 LAB
ALBUMIN SERPL-MCNC: 2.8 G/DL (ref 3.5–5.2)
ALP SERPL-CCNC: 59 U/L (ref 40–129)
ALT SERPL-CCNC: 13 U/L (ref 0–50)
ANION GAP SERPL CALCULATED.3IONS-SCNC: 15 MMOL/L (ref 7–16)
AST SERPL-CCNC: 28 U/L (ref 0–50)
BILIRUB SERPL-MCNC: 0.8 MG/DL (ref 0–1.2)
BUN SERPL-MCNC: 57 MG/DL (ref 8–23)
CALCIUM SERPL-MCNC: 7.8 MG/DL (ref 8.8–10.2)
CHLORIDE SERPL-SCNC: 100 MMOL/L (ref 98–107)
CO2 SERPL-SCNC: 20 MMOL/L (ref 22–29)
CREAT SERPL-MCNC: 2.4 MG/DL (ref 0.7–1.2)
ERYTHROCYTE [DISTWIDTH] IN BLOOD BY AUTOMATED COUNT: 15.6 % (ref 11.5–15)
FERRITIN SERPL-MCNC: 4521 NG/ML
GFR, ESTIMATED: 26 ML/MIN/1.73M2
GLUCOSE SERPL-MCNC: 122 MG/DL (ref 74–99)
HCT VFR BLD AUTO: 20.3 % (ref 37–54)
HGB BLD-MCNC: 7.2 G/DL (ref 12.5–16.5)
IRON SATN MFR SERPL: 39 % (ref 20–55)
IRON SERPL-MCNC: 39 UG/DL (ref 61–157)
MCH RBC QN AUTO: 28.1 PG (ref 26–35)
MCHC RBC AUTO-ENTMCNC: 35.5 G/DL (ref 32–34.5)
MCV RBC AUTO: 79.3 FL (ref 80–99.9)
MICROORGANISM SPEC CULT: ABNORMAL
MICROORGANISM SPEC CULT: ABNORMAL
PLATELET CONFIRMATION: NORMAL
PLATELET, FLUORESCENCE: 32 K/UL (ref 130–450)
PMV BLD AUTO: ABNORMAL FL (ref 7–12)
POTASSIUM SERPL-SCNC: 3.8 MMOL/L (ref 3.5–5.1)
PROT SERPL-MCNC: 5.2 G/DL (ref 6.4–8.3)
RBC # BLD AUTO: 2.56 M/UL (ref 3.8–5.8)
SERVICE CMNT-IMP: ABNORMAL
SODIUM SERPL-SCNC: 134 MMOL/L (ref 136–145)
SPECIMEN DESCRIPTION: ABNORMAL
TIBC SERPL-MCNC: 98 UG/DL (ref 250–450)
WBC OTHER # BLD: 3 K/UL (ref 4.5–11.5)

## 2025-08-12 PROCEDURE — 6370000000 HC RX 637 (ALT 250 FOR IP): Performed by: INTERNAL MEDICINE

## 2025-08-12 PROCEDURE — 1200000000 HC SEMI PRIVATE

## 2025-08-12 PROCEDURE — 2500000003 HC RX 250 WO HCPCS: Performed by: INTERNAL MEDICINE

## 2025-08-12 PROCEDURE — 83540 ASSAY OF IRON: CPT

## 2025-08-12 PROCEDURE — 97165 OT EVAL LOW COMPLEX 30 MIN: CPT

## 2025-08-12 PROCEDURE — 82728 ASSAY OF FERRITIN: CPT

## 2025-08-12 PROCEDURE — 2500000003 HC RX 250 WO HCPCS

## 2025-08-12 PROCEDURE — 97161 PT EVAL LOW COMPLEX 20 MIN: CPT

## 2025-08-12 PROCEDURE — 80053 COMPREHEN METABOLIC PANEL: CPT

## 2025-08-12 PROCEDURE — 6370000000 HC RX 637 (ALT 250 FOR IP)

## 2025-08-12 PROCEDURE — 83550 IRON BINDING TEST: CPT

## 2025-08-12 PROCEDURE — 2580000003 HC RX 258

## 2025-08-12 PROCEDURE — 6360000002 HC RX W HCPCS

## 2025-08-12 PROCEDURE — 85027 COMPLETE CBC AUTOMATED: CPT

## 2025-08-12 RX ORDER — LOPERAMIDE HYDROCHLORIDE 2 MG/1
2 CAPSULE ORAL EVERY 8 HOURS PRN
Status: DISCONTINUED | OUTPATIENT
Start: 2025-08-12 | End: 2025-08-26 | Stop reason: HOSPADM

## 2025-08-12 RX ORDER — LOPERAMIDE HYDROCHLORIDE 2 MG/1
CAPSULE ORAL
Status: COMPLETED
Start: 2025-08-12 | End: 2025-08-12

## 2025-08-12 RX ADMIN — LOPERAMIDE HYDROCHLORIDE 2 MG: 2 CAPSULE ORAL at 19:17

## 2025-08-12 RX ADMIN — MIRTAZAPINE 15 MG: 15 TABLET, FILM COATED ORAL at 20:34

## 2025-08-12 RX ADMIN — ALLOPURINOL 100 MG: 100 TABLET ORAL at 08:53

## 2025-08-12 RX ADMIN — SODIUM BICARBONATE: 84 INJECTION, SOLUTION INTRAVENOUS at 13:01

## 2025-08-12 RX ADMIN — Medication 100 MG: at 08:53

## 2025-08-12 RX ADMIN — SODIUM CHLORIDE, PRESERVATIVE FREE 10 ML: 5 INJECTION INTRAVENOUS at 20:34

## 2025-08-12 RX ADMIN — SODIUM BICARBONATE: 84 INJECTION, SOLUTION INTRAVENOUS at 18:45

## 2025-08-12 RX ADMIN — EPOETIN ALFA-EPBX 40000 UNITS: 40000 INJECTION, SOLUTION INTRAVENOUS; SUBCUTANEOUS at 14:55

## 2025-08-12 RX ADMIN — Medication 2000 UNITS: at 08:53

## 2025-08-12 RX ADMIN — EPOETIN ALFA-EPBX 20000 UNITS: 10000 INJECTION, SOLUTION INTRAVENOUS; SUBCUTANEOUS at 14:55

## 2025-08-12 RX ADMIN — Medication: at 07:45

## 2025-08-12 RX ADMIN — LEVOTHYROXINE SODIUM 100 MCG: 0.1 TABLET ORAL at 05:10

## 2025-08-12 ASSESSMENT — PAIN SCALES - GENERAL
PAINLEVEL_OUTOF10: 0
PAINLEVEL_OUTOF10: 0

## 2025-08-13 LAB
ALBUMIN SERPL-MCNC: 2.6 G/DL (ref 3.5–5.2)
ALP SERPL-CCNC: 54 U/L (ref 40–129)
ALT SERPL-CCNC: 14 U/L (ref 0–50)
ANION GAP SERPL CALCULATED.3IONS-SCNC: 15 MMOL/L (ref 7–16)
AST SERPL-CCNC: 33 U/L (ref 0–50)
BILIRUB SERPL-MCNC: 0.9 MG/DL (ref 0–1.2)
BUN SERPL-MCNC: 48 MG/DL (ref 8–23)
CALCIUM SERPL-MCNC: 6.8 MG/DL (ref 8.8–10.2)
CHLORIDE SERPL-SCNC: 94 MMOL/L (ref 98–107)
CO2 SERPL-SCNC: 24 MMOL/L (ref 22–29)
CREAT SERPL-MCNC: 2.2 MG/DL (ref 0.7–1.2)
ERYTHROCYTE [DISTWIDTH] IN BLOOD BY AUTOMATED COUNT: 15.4 % (ref 11.5–15)
GFR, ESTIMATED: 29 ML/MIN/1.73M2
GLUCOSE SERPL-MCNC: 118 MG/DL (ref 74–99)
HCT VFR BLD AUTO: 18.3 % (ref 37–54)
HCT VFR BLD AUTO: 23.8 % (ref 37–54)
HGB BLD-MCNC: 6.6 G/DL (ref 12.5–16.5)
HGB BLD-MCNC: 8.1 G/DL (ref 12.5–16.5)
MCH RBC QN AUTO: 28.6 PG (ref 26–35)
MCHC RBC AUTO-ENTMCNC: 36.1 G/DL (ref 32–34.5)
MCV RBC AUTO: 79.2 FL (ref 80–99.9)
PLATELET CONFIRMATION: NORMAL
PLATELET, FLUORESCENCE: 20 K/UL (ref 130–450)
PMV BLD AUTO: ABNORMAL FL (ref 7–12)
POTASSIUM SERPL-SCNC: 2.9 MMOL/L (ref 3.5–5.1)
PROT SERPL-MCNC: 5 G/DL (ref 6.4–8.3)
RBC # BLD AUTO: 2.31 M/UL (ref 3.8–5.8)
SODIUM SERPL-SCNC: 133 MMOL/L (ref 136–145)
WBC OTHER # BLD: 1.6 K/UL (ref 4.5–11.5)

## 2025-08-13 PROCEDURE — 85018 HEMOGLOBIN: CPT

## 2025-08-13 PROCEDURE — 36430 TRANSFUSION BLD/BLD COMPNT: CPT

## 2025-08-13 PROCEDURE — 85014 HEMATOCRIT: CPT

## 2025-08-13 PROCEDURE — 1200000000 HC SEMI PRIVATE

## 2025-08-13 PROCEDURE — 6370000000 HC RX 637 (ALT 250 FOR IP): Performed by: INTERNAL MEDICINE

## 2025-08-13 PROCEDURE — 85027 COMPLETE CBC AUTOMATED: CPT

## 2025-08-13 PROCEDURE — 2580000003 HC RX 258: Performed by: INTERNAL MEDICINE

## 2025-08-13 PROCEDURE — P9016 RBC LEUKOCYTES REDUCED: HCPCS

## 2025-08-13 PROCEDURE — 2580000003 HC RX 258

## 2025-08-13 PROCEDURE — 2500000003 HC RX 250 WO HCPCS

## 2025-08-13 PROCEDURE — 80053 COMPREHEN METABOLIC PANEL: CPT

## 2025-08-13 RX ORDER — SODIUM CHLORIDE, SODIUM LACTATE, POTASSIUM CHLORIDE, CALCIUM CHLORIDE 600; 310; 30; 20 MG/100ML; MG/100ML; MG/100ML; MG/100ML
INJECTION, SOLUTION INTRAVENOUS CONTINUOUS
Status: DISCONTINUED | OUTPATIENT
Start: 2025-08-13 | End: 2025-08-15

## 2025-08-13 RX ORDER — SODIUM CHLORIDE 9 MG/ML
INJECTION, SOLUTION INTRAVENOUS PRN
Status: DISCONTINUED | OUTPATIENT
Start: 2025-08-13 | End: 2025-08-26 | Stop reason: HOSPADM

## 2025-08-13 RX ORDER — POTASSIUM CHLORIDE 1500 MG/1
40 TABLET, EXTENDED RELEASE ORAL
Status: COMPLETED | OUTPATIENT
Start: 2025-08-13 | End: 2025-08-13

## 2025-08-13 RX ADMIN — ACETAMINOPHEN 650 MG: 325 TABLET ORAL at 15:50

## 2025-08-13 RX ADMIN — ALLOPURINOL 100 MG: 100 TABLET ORAL at 08:01

## 2025-08-13 RX ADMIN — LEVOTHYROXINE SODIUM 100 MCG: 0.1 TABLET ORAL at 05:02

## 2025-08-13 RX ADMIN — PETROLATUM: 420 OINTMENT TOPICAL at 14:16

## 2025-08-13 RX ADMIN — ACETAMINOPHEN 650 MG: 325 TABLET ORAL at 03:02

## 2025-08-13 RX ADMIN — POTASSIUM CHLORIDE 40 MEQ: 20 TABLET, EXTENDED RELEASE ORAL at 14:16

## 2025-08-13 RX ADMIN — Medication 100 MG: at 08:01

## 2025-08-13 RX ADMIN — POTASSIUM CHLORIDE 40 MEQ: 20 TABLET, EXTENDED RELEASE ORAL at 17:09

## 2025-08-13 RX ADMIN — ACETAMINOPHEN 650 MG: 325 TABLET ORAL at 21:39

## 2025-08-13 RX ADMIN — SODIUM CHLORIDE, POTASSIUM CHLORIDE, SODIUM LACTATE AND CALCIUM CHLORIDE: 600; 310; 30; 20 INJECTION, SOLUTION INTRAVENOUS at 18:46

## 2025-08-13 RX ADMIN — Medication 2000 UNITS: at 08:01

## 2025-08-13 RX ADMIN — LOPERAMIDE HYDROCHLORIDE 2 MG: 2 CAPSULE ORAL at 17:09

## 2025-08-13 RX ADMIN — SODIUM BICARBONATE: 84 INJECTION, SOLUTION INTRAVENOUS at 05:04

## 2025-08-13 RX ADMIN — POTASSIUM CHLORIDE 40 MEQ: 20 TABLET, EXTENDED RELEASE ORAL at 08:01

## 2025-08-13 RX ADMIN — MIRTAZAPINE 15 MG: 15 TABLET, FILM COATED ORAL at 21:39

## 2025-08-14 LAB
ALBUMIN SERPL-MCNC: 2.6 G/DL (ref 3.5–5.2)
ALP SERPL-CCNC: 60 U/L (ref 40–129)
ALT SERPL-CCNC: 16 U/L (ref 0–50)
ANION GAP SERPL CALCULATED.3IONS-SCNC: 17 MMOL/L (ref 7–16)
AST SERPL-CCNC: 33 U/L (ref 0–50)
ATYPICAL LYMPHOCYTE ABSOLUTE COUNT: 0.03 K/UL (ref 0–0.46)
ATYPICAL LYMPHOCYTES: 2 % (ref 0–4)
B PARAP IS1001 DNA NPH QL NAA+NON-PROBE: NOT DETECTED
B PERT DNA SPEC QL NAA+PROBE: NOT DETECTED
BACTERIA URNS QL MICRO: ABNORMAL
BASOPHILS # BLD: 0 K/UL (ref 0–0.2)
BASOPHILS NFR BLD: 0 % (ref 0–2)
BILIRUB SERPL-MCNC: 1 MG/DL (ref 0–1.2)
BILIRUB UR QL STRIP: NEGATIVE
BUN SERPL-MCNC: 52 MG/DL (ref 8–23)
C PNEUM DNA NPH QL NAA+NON-PROBE: NOT DETECTED
CALCIUM SERPL-MCNC: 7 MG/DL (ref 8.8–10.2)
CHLORIDE SERPL-SCNC: 94 MMOL/L (ref 98–107)
CLARITY UR: CLEAR
CO2 SERPL-SCNC: 22 MMOL/L (ref 22–29)
COLOR UR: YELLOW
CREAT SERPL-MCNC: 2.5 MG/DL (ref 0.7–1.2)
CRP SERPL HS-MCNC: 252 MG/L (ref 0–5)
EOSINOPHIL # BLD: 0 K/UL (ref 0.05–0.5)
EOSINOPHILS RELATIVE PERCENT: 0 % (ref 0–6)
ERYTHROCYTE [DISTWIDTH] IN BLOOD BY AUTOMATED COUNT: 15.3 % (ref 11.5–15)
FLUAV RNA NPH QL NAA+NON-PROBE: NOT DETECTED
FLUBV RNA NPH QL NAA+NON-PROBE: NOT DETECTED
GFR, ESTIMATED: 25 ML/MIN/1.73M2
GLUCOSE SERPL-MCNC: 111 MG/DL (ref 74–99)
GLUCOSE UR STRIP-MCNC: NEGATIVE MG/DL
HADV DNA NPH QL NAA+NON-PROBE: NOT DETECTED
HCOV 229E RNA NPH QL NAA+NON-PROBE: NOT DETECTED
HCOV HKU1 RNA NPH QL NAA+NON-PROBE: NOT DETECTED
HCOV NL63 RNA NPH QL NAA+NON-PROBE: NOT DETECTED
HCOV OC43 RNA NPH QL NAA+NON-PROBE: NOT DETECTED
HCT VFR BLD AUTO: 24.1 % (ref 37–54)
HGB BLD-MCNC: 8.5 G/DL (ref 12.5–16.5)
HGB UR QL STRIP.AUTO: ABNORMAL
HMPV RNA NPH QL NAA+NON-PROBE: NOT DETECTED
HPIV1 RNA NPH QL NAA+NON-PROBE: NOT DETECTED
HPIV2 RNA NPH QL NAA+NON-PROBE: NOT DETECTED
HPIV3 RNA NPH QL NAA+NON-PROBE: NOT DETECTED
HPIV4 RNA NPH QL NAA+NON-PROBE: NOT DETECTED
KETONES UR STRIP-MCNC: NEGATIVE MG/DL
LEUKOCYTE ESTERASE UR QL STRIP: ABNORMAL
LYMPHOCYTES NFR BLD: 0.96 K/UL (ref 1.5–4)
LYMPHOCYTES RELATIVE PERCENT: 50 % (ref 20–42)
M PNEUMO DNA NPH QL NAA+NON-PROBE: NOT DETECTED
MAGNESIUM SERPL-MCNC: 1 MG/DL (ref 1.6–2.4)
MCH RBC QN AUTO: 28.5 PG (ref 26–35)
MCHC RBC AUTO-ENTMCNC: 35.3 G/DL (ref 32–34.5)
MCV RBC AUTO: 80.9 FL (ref 80–99.9)
METAMYELOCYTES ABSOLUTE COUNT: 0.02 K/UL (ref 0–0.12)
METAMYELOCYTES: 1 % (ref 0–1)
MONOCYTES NFR BLD: 0 % (ref 2–12)
MONOCYTES NFR BLD: 0 K/UL (ref 0.1–0.95)
NEUTROPHILS NFR BLD: 47 % (ref 43–80)
NEUTS SEG NFR BLD: 0.89 K/UL (ref 1.8–7.3)
NITRITE UR QL STRIP: NEGATIVE
PH UR STRIP: 6 [PH] (ref 5–8)
PHOSPHATE SERPL-MCNC: 3.2 MG/DL (ref 2.5–4.5)
PLATELET CONFIRMATION: NORMAL
PLATELET, FLUORESCENCE: 27 K/UL (ref 130–450)
PMV BLD AUTO: ABNORMAL FL (ref 7–12)
POTASSIUM SERPL-SCNC: 4.1 MMOL/L (ref 3.5–5.1)
PROCALCITONIN SERPL-MCNC: 3.53 NG/ML (ref 0–0.08)
PROT SERPL-MCNC: 5.1 G/DL (ref 6.4–8.3)
PROT UR STRIP-MCNC: 100 MG/DL
RBC # BLD AUTO: 2.98 M/UL (ref 3.8–5.8)
RBC # BLD: ABNORMAL 10*6/UL
RBC #/AREA URNS HPF: ABNORMAL /HPF
RSV RNA NPH QL NAA+NON-PROBE: NOT DETECTED
RV+EV RNA NPH QL NAA+NON-PROBE: NOT DETECTED
SARS-COV-2 RNA NPH QL NAA+NON-PROBE: NOT DETECTED
SODIUM SERPL-SCNC: 133 MMOL/L (ref 136–145)
SP GR UR STRIP: 1.01 (ref 1–1.03)
SPECIMEN DESCRIPTION: NORMAL
UROBILINOGEN UR STRIP-ACNC: 0.2 EU/DL (ref 0–1)
WBC #/AREA URNS HPF: ABNORMAL /HPF
WBC OTHER # BLD: 1.9 K/UL (ref 4.5–11.5)

## 2025-08-14 PROCEDURE — 6360000002 HC RX W HCPCS

## 2025-08-14 PROCEDURE — 86140 C-REACTIVE PROTEIN: CPT

## 2025-08-14 PROCEDURE — 2580000003 HC RX 258

## 2025-08-14 PROCEDURE — 80053 COMPREHEN METABOLIC PANEL: CPT

## 2025-08-14 PROCEDURE — 2500000003 HC RX 250 WO HCPCS: Performed by: INTERNAL MEDICINE

## 2025-08-14 PROCEDURE — 0202U NFCT DS 22 TRGT SARS-COV-2: CPT

## 2025-08-14 PROCEDURE — 87077 CULTURE AEROBIC IDENTIFY: CPT

## 2025-08-14 PROCEDURE — 1200000000 HC SEMI PRIVATE

## 2025-08-14 PROCEDURE — 85027 COMPLETE CBC AUTOMATED: CPT

## 2025-08-14 PROCEDURE — 2580000003 HC RX 258: Performed by: INTERNAL MEDICINE

## 2025-08-14 PROCEDURE — 81001 URINALYSIS AUTO W/SCOPE: CPT

## 2025-08-14 PROCEDURE — 87150 DNA/RNA AMPLIFIED PROBE: CPT

## 2025-08-14 PROCEDURE — 84145 PROCALCITONIN (PCT): CPT

## 2025-08-14 PROCEDURE — 83735 ASSAY OF MAGNESIUM: CPT

## 2025-08-14 PROCEDURE — 97530 THERAPEUTIC ACTIVITIES: CPT

## 2025-08-14 PROCEDURE — 84100 ASSAY OF PHOSPHORUS: CPT

## 2025-08-14 PROCEDURE — 87040 BLOOD CULTURE FOR BACTERIA: CPT

## 2025-08-14 PROCEDURE — 87086 URINE CULTURE/COLONY COUNT: CPT

## 2025-08-14 PROCEDURE — 6370000000 HC RX 637 (ALT 250 FOR IP): Performed by: INTERNAL MEDICINE

## 2025-08-14 PROCEDURE — 36415 COLL VENOUS BLD VENIPUNCTURE: CPT

## 2025-08-14 RX ADMIN — SODIUM CHLORIDE, PRESERVATIVE FREE 10 ML: 5 INJECTION INTRAVENOUS at 09:34

## 2025-08-14 RX ADMIN — ACETAMINOPHEN 650 MG: 325 TABLET ORAL at 18:11

## 2025-08-14 RX ADMIN — PIPERACILLIN AND TAZOBACTAM 3375 MG: 3; .375 INJECTION, POWDER, LYOPHILIZED, FOR SOLUTION INTRAVENOUS at 14:25

## 2025-08-14 RX ADMIN — SODIUM CHLORIDE, POTASSIUM CHLORIDE, SODIUM LACTATE AND CALCIUM CHLORIDE: 600; 310; 30; 20 INJECTION, SOLUTION INTRAVENOUS at 21:46

## 2025-08-14 RX ADMIN — ALLOPURINOL 100 MG: 100 TABLET ORAL at 09:34

## 2025-08-14 RX ADMIN — Medication 2000 UNITS: at 09:34

## 2025-08-14 RX ADMIN — Medication 100 MG: at 09:34

## 2025-08-14 RX ADMIN — LOPERAMIDE HYDROCHLORIDE 2 MG: 2 CAPSULE ORAL at 19:01

## 2025-08-14 RX ADMIN — PETROLATUM: 420 OINTMENT TOPICAL at 09:56

## 2025-08-14 RX ADMIN — LEVOTHYROXINE SODIUM 100 MCG: 0.1 TABLET ORAL at 06:38

## 2025-08-14 RX ADMIN — MIRTAZAPINE 15 MG: 15 TABLET, FILM COATED ORAL at 19:45

## 2025-08-14 RX ADMIN — SODIUM CHLORIDE, PRESERVATIVE FREE 10 ML: 5 INJECTION INTRAVENOUS at 19:41

## 2025-08-14 ASSESSMENT — PAIN SCALES - GENERAL
PAINLEVEL_OUTOF10: 0

## 2025-08-15 LAB
ABO/RH: NORMAL
ALBUMIN SERPL-MCNC: 2.4 G/DL (ref 3.5–5.2)
ALP SERPL-CCNC: 57 U/L (ref 40–129)
ALT SERPL-CCNC: 15 U/L (ref 0–50)
ANION GAP SERPL CALCULATED.3IONS-SCNC: 15 MMOL/L (ref 7–16)
ANION GAP SERPL CALCULATED.3IONS-SCNC: 15 MMOL/L (ref 7–16)
ANTIBODY SCREEN: NEGATIVE
ARM BAND NUMBER: NORMAL
AST SERPL-CCNC: 37 U/L (ref 0–50)
BASOPHILS # BLD: 0 K/UL (ref 0–0.2)
BASOPHILS NFR BLD: 0 % (ref 0–2)
BILIRUB SERPL-MCNC: 1 MG/DL (ref 0–1.2)
BLOOD BANK BLOOD PRODUCT EXPIRATION DATE: NORMAL
BLOOD BANK BLOOD PRODUCT EXPIRATION DATE: NORMAL
BLOOD BANK DISPENSE STATUS: NORMAL
BLOOD BANK DISPENSE STATUS: NORMAL
BLOOD BANK ISBT PRODUCT BLOOD TYPE: 7300
BLOOD BANK ISBT PRODUCT BLOOD TYPE: 7300
BLOOD BANK PRODUCT CODE: NORMAL
BLOOD BANK PRODUCT CODE: NORMAL
BLOOD BANK SAMPLE EXPIRATION: NORMAL
BLOOD BANK UNIT TYPE AND RH: NORMAL
BLOOD BANK UNIT TYPE AND RH: NORMAL
BPU ID: NORMAL
BPU ID: NORMAL
BUN SERPL-MCNC: 57 MG/DL (ref 8–23)
BUN SERPL-MCNC: 61 MG/DL (ref 8–23)
CALCIUM SERPL-MCNC: 7 MG/DL (ref 8.8–10.2)
CALCIUM SERPL-MCNC: 7.2 MG/DL (ref 8.8–10.2)
CHLORIDE SERPL-SCNC: 89 MMOL/L (ref 98–107)
CHLORIDE SERPL-SCNC: 89 MMOL/L (ref 98–107)
CHLORIDE UR-SCNC: 21 MMOL/L
CO2 SERPL-SCNC: 20 MMOL/L (ref 22–29)
CO2 SERPL-SCNC: 22 MMOL/L (ref 22–29)
COMPONENT: NORMAL
COMPONENT: NORMAL
CREAT SERPL-MCNC: 3 MG/DL (ref 0.7–1.2)
CREAT SERPL-MCNC: 3.2 MG/DL (ref 0.7–1.2)
CREAT UR-MCNC: 30.7 MG/DL (ref 40–278)
CROSSMATCH RESULT: NORMAL
CROSSMATCH RESULT: NORMAL
EOSINOPHIL # BLD: 0 K/UL (ref 0.05–0.5)
EOSINOPHILS RELATIVE PERCENT: 0 % (ref 0–6)
ERYTHROCYTE [DISTWIDTH] IN BLOOD BY AUTOMATED COUNT: 15.3 % (ref 11.5–15)
ERYTHROCYTE [SEDIMENTATION RATE] IN BLOOD BY WESTERGREN METHOD: 83 MM/HR (ref 0–15)
GFR, ESTIMATED: 18 ML/MIN/1.73M2
GFR, ESTIMATED: 20 ML/MIN/1.73M2
GLUCOSE SERPL-MCNC: 124 MG/DL (ref 74–99)
GLUCOSE SERPL-MCNC: 98 MG/DL (ref 74–99)
HCT VFR BLD AUTO: 21.7 % (ref 37–54)
HGB BLD-MCNC: 7.5 G/DL (ref 12.5–16.5)
LYMPHOCYTES NFR BLD: 1.02 K/UL (ref 1.5–4)
LYMPHOCYTES RELATIVE PERCENT: 51 % (ref 20–42)
MAGNESIUM SERPL-MCNC: 1.1 MG/DL (ref 1.6–2.4)
MCH RBC QN AUTO: 28.4 PG (ref 26–35)
MCHC RBC AUTO-ENTMCNC: 34.6 G/DL (ref 32–34.5)
MCV RBC AUTO: 82.2 FL (ref 80–99.9)
MONOCYTES NFR BLD: 0.05 K/UL (ref 0.1–0.95)
MONOCYTES NFR BLD: 3 % (ref 2–12)
NEUTROPHILS NFR BLD: 47 % (ref 43–80)
NEUTS SEG NFR BLD: 0.93 K/UL (ref 1.8–7.3)
NUCLEATED RED BLOOD CELLS: 1 PER 100 WBC
OSMOLALITY UR: 213 MOSM/KG (ref 300–900)
PHOSPHATE SERPL-MCNC: 3.9 MG/DL (ref 2.5–4.5)
PLATELET CONFIRMATION: NORMAL
PLATELET, FLUORESCENCE: 23 K/UL (ref 130–450)
PMV BLD AUTO: ABNORMAL FL (ref 7–12)
POTASSIUM SERPL-SCNC: 3.9 MMOL/L (ref 3.5–5.1)
POTASSIUM SERPL-SCNC: 4 MMOL/L (ref 3.5–5.1)
PROT SERPL-MCNC: 4.8 G/DL (ref 6.4–8.3)
RBC # BLD AUTO: 2.64 M/UL (ref 3.8–5.8)
RBC # BLD: ABNORMAL 10*6/UL
SODIUM SERPL-SCNC: 124 MMOL/L (ref 136–145)
SODIUM SERPL-SCNC: 126 MMOL/L (ref 136–145)
SODIUM UR-SCNC: 31 MMOL/L
TOTAL PROTEIN, URINE: 117 MG/DL (ref 0–12)
TRANSFUSION STATUS: NORMAL
TRANSFUSION STATUS: NORMAL
UNIT DIVISION: 0
UNIT DIVISION: 0
UNIT ISSUE DATE/TIME: NORMAL
UNIT ISSUE DATE/TIME: NORMAL
URINE TOTAL PROTEIN CREATININE RATIO: 3.81 (ref 0–0.2)
WBC OTHER # BLD: 2 K/UL (ref 4.5–11.5)

## 2025-08-15 PROCEDURE — 80053 COMPREHEN METABOLIC PANEL: CPT

## 2025-08-15 PROCEDURE — 84100 ASSAY OF PHOSPHORUS: CPT

## 2025-08-15 PROCEDURE — 2580000003 HC RX 258: Performed by: INTERNAL MEDICINE

## 2025-08-15 PROCEDURE — 84156 ASSAY OF PROTEIN URINE: CPT

## 2025-08-15 PROCEDURE — 1200000000 HC SEMI PRIVATE

## 2025-08-15 PROCEDURE — 80048 BASIC METABOLIC PNL TOTAL CA: CPT

## 2025-08-15 PROCEDURE — 85652 RBC SED RATE AUTOMATED: CPT

## 2025-08-15 PROCEDURE — 36415 COLL VENOUS BLD VENIPUNCTURE: CPT

## 2025-08-15 PROCEDURE — 85025 COMPLETE CBC W/AUTO DIFF WBC: CPT

## 2025-08-15 PROCEDURE — 6370000000 HC RX 637 (ALT 250 FOR IP): Performed by: INTERNAL MEDICINE

## 2025-08-15 PROCEDURE — 83935 ASSAY OF URINE OSMOLALITY: CPT

## 2025-08-15 PROCEDURE — 82570 ASSAY OF URINE CREATININE: CPT

## 2025-08-15 PROCEDURE — 82436 ASSAY OF URINE CHLORIDE: CPT

## 2025-08-15 PROCEDURE — 2580000003 HC RX 258

## 2025-08-15 PROCEDURE — 2500000003 HC RX 250 WO HCPCS: Performed by: INTERNAL MEDICINE

## 2025-08-15 PROCEDURE — 83735 ASSAY OF MAGNESIUM: CPT

## 2025-08-15 PROCEDURE — 6360000002 HC RX W HCPCS

## 2025-08-15 PROCEDURE — 84300 ASSAY OF URINE SODIUM: CPT

## 2025-08-15 RX ORDER — ACETAMINOPHEN 500 MG
500 TABLET ORAL ONCE
Status: COMPLETED | OUTPATIENT
Start: 2025-08-15 | End: 2025-08-15

## 2025-08-15 RX ORDER — SODIUM CHLORIDE 9 MG/ML
INJECTION, SOLUTION INTRAVENOUS CONTINUOUS
Status: DISCONTINUED | OUTPATIENT
Start: 2025-08-15 | End: 2025-08-18

## 2025-08-15 RX ADMIN — ACETAMINOPHEN 650 MG: 325 TABLET ORAL at 10:19

## 2025-08-15 RX ADMIN — Medication 2000 UNITS: at 08:23

## 2025-08-15 RX ADMIN — ALLOPURINOL 100 MG: 100 TABLET ORAL at 08:23

## 2025-08-15 RX ADMIN — MIRTAZAPINE 15 MG: 15 TABLET, FILM COATED ORAL at 19:32

## 2025-08-15 RX ADMIN — SODIUM CHLORIDE, PRESERVATIVE FREE 10 ML: 5 INJECTION INTRAVENOUS at 19:32

## 2025-08-15 RX ADMIN — PETROLATUM: 420 OINTMENT TOPICAL at 13:12

## 2025-08-15 RX ADMIN — SODIUM CHLORIDE: 0.9 INJECTION, SOLUTION INTRAVENOUS at 11:17

## 2025-08-15 RX ADMIN — ACETAMINOPHEN 650 MG: 325 TABLET ORAL at 19:31

## 2025-08-15 RX ADMIN — ACETAMINOPHEN 500 MG: 500 TABLET ORAL at 21:56

## 2025-08-15 RX ADMIN — LEVOTHYROXINE SODIUM 100 MCG: 0.1 TABLET ORAL at 05:05

## 2025-08-15 RX ADMIN — SODIUM CHLORIDE, PRESERVATIVE FREE 10 ML: 5 INJECTION INTRAVENOUS at 08:23

## 2025-08-15 RX ADMIN — SODIUM CHLORIDE: 0.9 INJECTION, SOLUTION INTRAVENOUS at 23:53

## 2025-08-15 RX ADMIN — PETROLATUM: 420 OINTMENT TOPICAL at 08:23

## 2025-08-15 RX ADMIN — Medication 100 MG: at 08:23

## 2025-08-15 RX ADMIN — PIPERACILLIN AND TAZOBACTAM 3375 MG: 3; .375 INJECTION, POWDER, LYOPHILIZED, FOR SOLUTION INTRAVENOUS at 01:40

## 2025-08-15 RX ADMIN — PIPERACILLIN AND TAZOBACTAM 3375 MG: 3; .375 INJECTION, POWDER, LYOPHILIZED, FOR SOLUTION INTRAVENOUS at 13:12

## 2025-08-15 ASSESSMENT — PAIN SCALES - GENERAL: PAINLEVEL_OUTOF10: 0

## 2025-08-15 ASSESSMENT — PAIN - FUNCTIONAL ASSESSMENT: PAIN_FUNCTIONAL_ASSESSMENT: 0-10

## 2025-08-16 ENCOUNTER — APPOINTMENT (OUTPATIENT)
Dept: GENERAL RADIOLOGY | Age: 85
DRG: 871 | End: 2025-08-16
Payer: COMMERCIAL

## 2025-08-16 PROBLEM — D64.9 SYMPTOMATIC ANEMIA: Status: RESOLVED | Noted: 2024-08-12 | Resolved: 2025-08-16

## 2025-08-16 PROBLEM — I47.29 NSVT (NONSUSTAINED VENTRICULAR TACHYCARDIA) (HCC): Status: ACTIVE | Noted: 2025-08-16

## 2025-08-16 PROBLEM — D75.81 MYELOFIBROSIS (HCC): Status: ACTIVE | Noted: 2025-08-16

## 2025-08-16 LAB
ACB COMPLEX DNA BLD POS QL NAA+NON-PROBE: NOT DETECTED
ALBUMIN SERPL-MCNC: 2.2 G/DL (ref 3.5–5.2)
ALP SERPL-CCNC: 60 U/L (ref 40–129)
ALT SERPL-CCNC: 15 U/L (ref 0–50)
ANION GAP SERPL CALCULATED.3IONS-SCNC: 16 MMOL/L (ref 7–16)
AST SERPL-CCNC: 32 U/L (ref 0–50)
B FRAGILIS DNA BLD POS QL NAA+NON-PROBE: NOT DETECTED
BASOPHILS # BLD: 0 K/UL (ref 0–0.2)
BASOPHILS NFR BLD: 0 % (ref 0–2)
BILIRUB SERPL-MCNC: 0.7 MG/DL (ref 0–1.2)
BIOFIRE TEST COMMENT: ABNORMAL
BLACTX-M ISLT/SPM QL: NOT DETECTED
BLAIMP ISLT/SPM QL: NOT DETECTED
BLAKPC ISLT/SPM QL: NOT DETECTED
BLAOXA-48-LIKE ISLT/SPM QL: NOT DETECTED
BLAVIM ISLT/SPM QL: NOT DETECTED
BNP SERPL-MCNC: ABNORMAL PG/ML (ref 0–450)
BUN SERPL-MCNC: 70 MG/DL (ref 8–23)
C ALBICANS DNA BLD POS QL NAA+NON-PROBE: NOT DETECTED
C AURIS DNA BLD POS QL NAA+NON-PROBE: NOT DETECTED
C GATTII+NEOFOR DNA BLD POS QL NAA+N-PRB: NOT DETECTED
C GLABRATA DNA BLD POS QL NAA+NON-PROBE: NOT DETECTED
C KRUSEI DNA BLD POS QL NAA+NON-PROBE: NOT DETECTED
C PARAP DNA BLD POS QL NAA+NON-PROBE: NOT DETECTED
C TROPICLS DNA BLD POS QL NAA+NON-PROBE: NOT DETECTED
CALCIUM SERPL-MCNC: 6.9 MG/DL (ref 8.8–10.2)
CHLORIDE SERPL-SCNC: 91 MMOL/L (ref 98–107)
CO2 SERPL-SCNC: 17 MMOL/L (ref 22–29)
COLISTIN RES MCR-1 ISLT/SPM QL: NOT DETECTED
CREAT SERPL-MCNC: 3.3 MG/DL (ref 0.7–1.2)
E CLOAC COMP DNA BLD POS NAA+NON-PROBE: NOT DETECTED
E COLI DNA BLD POS QL NAA+NON-PROBE: NOT DETECTED
E FAECALIS DNA BLD POS QL NAA+NON-PROBE: NOT DETECTED
E FAECIUM DNA BLD POS QL NAA+NON-PROBE: NOT DETECTED
ENTEROBACTERALES DNA BLD POS NAA+N-PRB: DETECTED
EOSINOPHIL # BLD: 0 K/UL (ref 0.05–0.5)
EOSINOPHILS RELATIVE PERCENT: 0 % (ref 0–6)
ERYTHROCYTE [DISTWIDTH] IN BLOOD BY AUTOMATED COUNT: 15.5 % (ref 11.5–15)
GFR, ESTIMATED: 18 ML/MIN/1.73M2
GLUCOSE SERPL-MCNC: 104 MG/DL (ref 74–99)
GP B STREP DNA BLD POS QL NAA+NON-PROBE: NOT DETECTED
HAEM INFLU DNA BLD POS QL NAA+NON-PROBE: NOT DETECTED
HCT VFR BLD AUTO: 22.1 % (ref 37–54)
HGB BLD-MCNC: 7.6 G/DL (ref 12.5–16.5)
K OXYTOCA DNA BLD POS QL NAA+NON-PROBE: NOT DETECTED
KLEBSIELLA SP DNA BLD POS QL NAA+NON-PRB: DETECTED
KLEBSIELLA SP DNA BLD POS QL NAA+NON-PRB: NOT DETECTED
L MONOCYTOG DNA BLD POS QL NAA+NON-PROBE: NOT DETECTED
LACTATE BLDV-SCNC: 1.8 MMOL/L (ref 0.5–2.2)
LYMPHOCYTES NFR BLD: 0.77 K/UL (ref 1.5–4)
LYMPHOCYTES RELATIVE PERCENT: 55 % (ref 20–42)
MAGNESIUM SERPL-MCNC: 1.1 MG/DL (ref 1.6–2.4)
MCH RBC QN AUTO: 28.5 PG (ref 26–35)
MCHC RBC AUTO-ENTMCNC: 34.4 G/DL (ref 32–34.5)
MCV RBC AUTO: 82.8 FL (ref 80–99.9)
MICROORGANISM SPEC CULT: ABNORMAL
MICROORGANISM/AGENT SPEC: ABNORMAL
MICROORGANISM/AGENT SPEC: ABNORMAL
MONOCYTES NFR BLD: 0.02 K/UL (ref 0.1–0.95)
MONOCYTES NFR BLD: 2 % (ref 2–12)
N MEN DNA BLD POS QL NAA+NON-PROBE: NOT DETECTED
NEUTROPHILS NFR BLD: 44 % (ref 43–80)
NEUTS SEG NFR BLD: 0.61 K/UL (ref 1.8–7.3)
P AERUGINOSA DNA BLD POS NAA+NON-PROBE: NOT DETECTED
PLATELET CONFIRMATION: NORMAL
PLATELET, FLUORESCENCE: 19 K/UL (ref 130–450)
PMV BLD AUTO: ABNORMAL FL (ref 7–12)
POTASSIUM SERPL-SCNC: 3.8 MMOL/L (ref 3.5–5.1)
PROT SERPL-MCNC: 4.7 G/DL (ref 6.4–8.3)
PROTEUS SP DNA BLD POS QL NAA+NON-PROBE: NOT DETECTED
RBC # BLD AUTO: 2.67 M/UL (ref 3.8–5.8)
RBC # BLD: ABNORMAL 10*6/UL
RESISTANT GENE NDM BY PCR: NOT DETECTED
S AUREUS DNA BLD POS QL NAA+NON-PROBE: NOT DETECTED
S AUREUS+CONS DNA BLD POS NAA+NON-PROBE: NOT DETECTED
S EPIDERMIDIS DNA BLD POS QL NAA+NON-PRB: NOT DETECTED
S LUGDUNENSIS DNA BLD POS QL NAA+NON-PRB: NOT DETECTED
S MALTOPHILIA DNA BLD POS QL NAA+NON-PRB: NOT DETECTED
S MARCESCENS DNA BLD POS NAA+NON-PROBE: NOT DETECTED
S PNEUM DNA BLD POS QL NAA+NON-PROBE: NOT DETECTED
S PYO DNA BLD POS QL NAA+NON-PROBE: NOT DETECTED
SALMONELLA DNA BLD POS QL NAA+NON-PROBE: NOT DETECTED
SERVICE CMNT-IMP: ABNORMAL
SODIUM SERPL-SCNC: 124 MMOL/L (ref 136–145)
SPECIMEN DESCRIPTION: ABNORMAL
STREPTOCOCCUS DNA BLD POS NAA+NON-PROBE: NOT DETECTED
TSH SERPL DL<=0.05 MIU/L-ACNC: 6.82 UIU/ML (ref 0.27–4.2)
WBC OTHER # BLD: 1.4 K/UL (ref 4.5–11.5)

## 2025-08-16 PROCEDURE — 71045 X-RAY EXAM CHEST 1 VIEW: CPT

## 2025-08-16 PROCEDURE — APPSS60 APP SPLIT SHARED TIME 46-60 MINUTES

## 2025-08-16 PROCEDURE — 80053 COMPREHEN METABOLIC PANEL: CPT

## 2025-08-16 PROCEDURE — 83605 ASSAY OF LACTIC ACID: CPT

## 2025-08-16 PROCEDURE — 85025 COMPLETE CBC W/AUTO DIFF WBC: CPT

## 2025-08-16 PROCEDURE — 6360000002 HC RX W HCPCS: Performed by: INTERNAL MEDICINE

## 2025-08-16 PROCEDURE — 99222 1ST HOSP IP/OBS MODERATE 55: CPT | Performed by: INTERNAL MEDICINE

## 2025-08-16 PROCEDURE — 6370000000 HC RX 637 (ALT 250 FOR IP): Performed by: INTERNAL MEDICINE

## 2025-08-16 PROCEDURE — 1200000000 HC SEMI PRIVATE

## 2025-08-16 PROCEDURE — 2500000003 HC RX 250 WO HCPCS: Performed by: INTERNAL MEDICINE

## 2025-08-16 PROCEDURE — 93005 ELECTROCARDIOGRAM TRACING: CPT

## 2025-08-16 PROCEDURE — 2580000003 HC RX 258: Performed by: INTERNAL MEDICINE

## 2025-08-16 PROCEDURE — 83735 ASSAY OF MAGNESIUM: CPT

## 2025-08-16 PROCEDURE — 36415 COLL VENOUS BLD VENIPUNCTURE: CPT

## 2025-08-16 PROCEDURE — 93005 ELECTROCARDIOGRAM TRACING: CPT | Performed by: INTERNAL MEDICINE

## 2025-08-16 PROCEDURE — 6360000002 HC RX W HCPCS

## 2025-08-16 PROCEDURE — 84443 ASSAY THYROID STIM HORMONE: CPT

## 2025-08-16 PROCEDURE — 2580000003 HC RX 258

## 2025-08-16 PROCEDURE — 83880 ASSAY OF NATRIURETIC PEPTIDE: CPT

## 2025-08-16 RX ORDER — MAGNESIUM SULFATE IN WATER 40 MG/ML
2000 INJECTION, SOLUTION INTRAVENOUS ONCE
Status: COMPLETED | OUTPATIENT
Start: 2025-08-16 | End: 2025-08-16

## 2025-08-16 RX ORDER — METOPROLOL SUCCINATE 25 MG/1
25 TABLET, EXTENDED RELEASE ORAL ONCE
Status: COMPLETED | OUTPATIENT
Start: 2025-08-16 | End: 2025-08-16

## 2025-08-16 RX ORDER — DIGOXIN 0.25 MG/ML
250 INJECTION INTRAMUSCULAR; INTRAVENOUS ONCE
Status: COMPLETED | OUTPATIENT
Start: 2025-08-16 | End: 2025-08-16

## 2025-08-16 RX ORDER — METOPROLOL SUCCINATE 25 MG/1
25 TABLET, EXTENDED RELEASE ORAL 2 TIMES DAILY
Status: DISCONTINUED | OUTPATIENT
Start: 2025-08-16 | End: 2025-08-26 | Stop reason: HOSPADM

## 2025-08-16 RX ORDER — MAGNESIUM SULFATE 1 G/100ML
1000 INJECTION INTRAVENOUS ONCE
Status: COMPLETED | OUTPATIENT
Start: 2025-08-16 | End: 2025-08-16

## 2025-08-16 RX ORDER — MAGNESIUM SULFATE IN WATER 40 MG/ML
4000 INJECTION, SOLUTION INTRAVENOUS ONCE
Status: DISCONTINUED | OUTPATIENT
Start: 2025-08-16 | End: 2025-08-16

## 2025-08-16 RX ADMIN — PETROLATUM: 420 OINTMENT TOPICAL at 08:16

## 2025-08-16 RX ADMIN — MAGNESIUM SULFATE HEPTAHYDRATE 2000 MG: 40 INJECTION, SOLUTION INTRAVENOUS at 09:32

## 2025-08-16 RX ADMIN — ALLOPURINOL 100 MG: 100 TABLET ORAL at 08:16

## 2025-08-16 RX ADMIN — SODIUM BICARBONATE: 84 INJECTION, SOLUTION INTRAVENOUS at 17:08

## 2025-08-16 RX ADMIN — LOPERAMIDE HYDROCHLORIDE 2 MG: 2 CAPSULE ORAL at 05:11

## 2025-08-16 RX ADMIN — SODIUM CHLORIDE, PRESERVATIVE FREE 10 ML: 5 INJECTION INTRAVENOUS at 08:16

## 2025-08-16 RX ADMIN — MAGNESIUM SULFATE HEPTAHYDRATE 1000 MG: 1 INJECTION, SOLUTION INTRAVENOUS at 08:29

## 2025-08-16 RX ADMIN — METOPROLOL SUCCINATE 25 MG: 25 TABLET, EXTENDED RELEASE ORAL at 08:16

## 2025-08-16 RX ADMIN — Medication 100 MG: at 08:16

## 2025-08-16 RX ADMIN — MIRTAZAPINE 15 MG: 15 TABLET, FILM COATED ORAL at 20:46

## 2025-08-16 RX ADMIN — PETROLATUM: 420 OINTMENT TOPICAL at 14:22

## 2025-08-16 RX ADMIN — Medication 2000 UNITS: at 08:16

## 2025-08-16 RX ADMIN — PIPERACILLIN AND TAZOBACTAM 3375 MG: 3; .375 INJECTION, POWDER, LYOPHILIZED, FOR SOLUTION INTRAVENOUS at 14:22

## 2025-08-16 RX ADMIN — PIPERACILLIN AND TAZOBACTAM 3375 MG: 3; .375 INJECTION, POWDER, LYOPHILIZED, FOR SOLUTION INTRAVENOUS at 02:25

## 2025-08-16 RX ADMIN — METOPROLOL SUCCINATE 25 MG: 25 TABLET, EXTENDED RELEASE ORAL at 22:03

## 2025-08-16 RX ADMIN — LEVOTHYROXINE SODIUM 100 MCG: 0.1 TABLET ORAL at 06:17

## 2025-08-16 RX ADMIN — DIGOXIN 250 MCG: 0.25 INJECTION INTRAMUSCULAR; INTRAVENOUS at 22:05

## 2025-08-16 RX ADMIN — SODIUM CHLORIDE, PRESERVATIVE FREE 10 ML: 5 INJECTION INTRAVENOUS at 20:47

## 2025-08-16 ASSESSMENT — PAIN SCALES - GENERAL: PAINLEVEL_OUTOF10: 0

## 2025-08-17 ENCOUNTER — APPOINTMENT (OUTPATIENT)
Age: 85
DRG: 871 | End: 2025-08-17
Payer: COMMERCIAL

## 2025-08-17 LAB
ALBUMIN SERPL-MCNC: 2.2 G/DL (ref 3.5–5.2)
ALP SERPL-CCNC: 50 U/L (ref 40–129)
ALT SERPL-CCNC: 11 U/L (ref 0–50)
ANION GAP SERPL CALCULATED.3IONS-SCNC: 14 MMOL/L (ref 7–16)
AST SERPL-CCNC: 18 U/L (ref 0–50)
BASOPHILS # BLD: 0 K/UL (ref 0–0.2)
BASOPHILS NFR BLD: 0 % (ref 0–2)
BILIRUB SERPL-MCNC: 0.6 MG/DL (ref 0–1.2)
BUN SERPL-MCNC: 69 MG/DL (ref 8–23)
CALCIUM SERPL-MCNC: 7 MG/DL (ref 8.8–10.2)
CHLORIDE SERPL-SCNC: 94 MMOL/L (ref 98–107)
CO2 SERPL-SCNC: 21 MMOL/L (ref 22–29)
CREAT SERPL-MCNC: 3.1 MG/DL (ref 0.7–1.2)
EOSINOPHIL # BLD: 0.01 K/UL (ref 0.05–0.5)
EOSINOPHILS RELATIVE PERCENT: 1 % (ref 0–6)
ERYTHROCYTE [DISTWIDTH] IN BLOOD BY AUTOMATED COUNT: 15.1 % (ref 11.5–15)
GFR, ESTIMATED: 19 ML/MIN/1.73M2
GLUCOSE SERPL-MCNC: 97 MG/DL (ref 74–99)
HCT VFR BLD AUTO: 17.8 % (ref 37–54)
HCT VFR BLD AUTO: 24 % (ref 37–54)
HGB BLD-MCNC: 6 G/DL (ref 12.5–16.5)
HGB BLD-MCNC: 8.5 G/DL (ref 12.5–16.5)
LYMPHOCYTES NFR BLD: 0.48 K/UL (ref 1.5–4)
LYMPHOCYTES RELATIVE PERCENT: 54 % (ref 20–42)
MAGNESIUM SERPL-MCNC: 1.6 MG/DL (ref 1.6–2.4)
MCH RBC QN AUTO: 28.2 PG (ref 26–35)
MCHC RBC AUTO-ENTMCNC: 33.7 G/DL (ref 32–34.5)
MCV RBC AUTO: 83.6 FL (ref 80–99.9)
MICROORGANISM SPEC CULT: ABNORMAL
MONOCYTES NFR BLD: 0.01 K/UL (ref 0.1–0.95)
MONOCYTES NFR BLD: 1 % (ref 2–12)
NEUTROPHILS NFR BLD: 43 % (ref 43–80)
NEUTS SEG NFR BLD: 0.39 K/UL (ref 1.8–7.3)
PHOSPHATE SERPL-MCNC: 3.9 MG/DL (ref 2.5–4.5)
PLATELET CONFIRMATION: NORMAL
PLATELET, FLUORESCENCE: 17 K/UL (ref 130–450)
PMV BLD AUTO: ABNORMAL FL (ref 7–12)
POTASSIUM SERPL-SCNC: 3.4 MMOL/L (ref 3.5–5.1)
PROMYELOCYTES ABSOLUTE COUNT: 0.01 K/UL
PROMYELOCYTES: 1 %
PROT SERPL-MCNC: 4.3 G/DL (ref 6.4–8.3)
RBC # BLD AUTO: 2.13 M/UL (ref 3.8–5.8)
RBC # BLD: ABNORMAL 10*6/UL
SERVICE CMNT-IMP: ABNORMAL
SODIUM SERPL-SCNC: 129 MMOL/L (ref 136–145)
SPECIMEN DESCRIPTION: ABNORMAL
WBC OTHER # BLD: 0.9 K/UL (ref 4.5–11.5)

## 2025-08-17 PROCEDURE — 6360000002 HC RX W HCPCS

## 2025-08-17 PROCEDURE — 6370000000 HC RX 637 (ALT 250 FOR IP): Performed by: INTERNAL MEDICINE

## 2025-08-17 PROCEDURE — 2060000000 HC ICU INTERMEDIATE R&B

## 2025-08-17 PROCEDURE — 2500000003 HC RX 250 WO HCPCS: Performed by: INTERNAL MEDICINE

## 2025-08-17 PROCEDURE — 36430 TRANSFUSION BLD/BLD COMPNT: CPT

## 2025-08-17 PROCEDURE — 86900 BLOOD TYPING SEROLOGIC ABO: CPT

## 2025-08-17 PROCEDURE — 36415 COLL VENOUS BLD VENIPUNCTURE: CPT

## 2025-08-17 PROCEDURE — 99233 SBSQ HOSP IP/OBS HIGH 50: CPT | Performed by: INTERNAL MEDICINE

## 2025-08-17 PROCEDURE — 2580000003 HC RX 258

## 2025-08-17 PROCEDURE — 2580000003 HC RX 258: Performed by: INTERNAL MEDICINE

## 2025-08-17 PROCEDURE — P9073 PLATELETS PHERESIS PATH REDU: HCPCS

## 2025-08-17 PROCEDURE — 85014 HEMATOCRIT: CPT

## 2025-08-17 PROCEDURE — 86850 RBC ANTIBODY SCREEN: CPT

## 2025-08-17 PROCEDURE — 2700000000 HC OXYGEN THERAPY PER DAY

## 2025-08-17 PROCEDURE — 6360000002 HC RX W HCPCS: Performed by: INTERNAL MEDICINE

## 2025-08-17 PROCEDURE — 86901 BLOOD TYPING SEROLOGIC RH(D): CPT

## 2025-08-17 PROCEDURE — 84100 ASSAY OF PHOSPHORUS: CPT

## 2025-08-17 PROCEDURE — 80053 COMPREHEN METABOLIC PANEL: CPT

## 2025-08-17 PROCEDURE — 85018 HEMOGLOBIN: CPT

## 2025-08-17 PROCEDURE — 83735 ASSAY OF MAGNESIUM: CPT

## 2025-08-17 PROCEDURE — 86923 COMPATIBILITY TEST ELECTRIC: CPT

## 2025-08-17 PROCEDURE — P9016 RBC LEUKOCYTES REDUCED: HCPCS

## 2025-08-17 PROCEDURE — 85025 COMPLETE CBC W/AUTO DIFF WBC: CPT

## 2025-08-17 RX ORDER — MIRTAZAPINE 15 MG/1
30 TABLET, FILM COATED ORAL NIGHTLY
Status: DISCONTINUED | OUTPATIENT
Start: 2025-08-17 | End: 2025-08-26 | Stop reason: HOSPADM

## 2025-08-17 RX ORDER — SODIUM CHLORIDE 9 MG/ML
INJECTION, SOLUTION INTRAVENOUS PRN
Status: DISCONTINUED | OUTPATIENT
Start: 2025-08-17 | End: 2025-08-26

## 2025-08-17 RX ADMIN — METOPROLOL SUCCINATE 25 MG: 25 TABLET, EXTENDED RELEASE ORAL at 19:45

## 2025-08-17 RX ADMIN — METOPROLOL SUCCINATE 25 MG: 25 TABLET, EXTENDED RELEASE ORAL at 08:36

## 2025-08-17 RX ADMIN — PETROLATUM: 420 OINTMENT TOPICAL at 16:00

## 2025-08-17 RX ADMIN — PIPERACILLIN AND TAZOBACTAM 3375 MG: 3; .375 INJECTION, POWDER, LYOPHILIZED, FOR SOLUTION INTRAVENOUS at 00:57

## 2025-08-17 RX ADMIN — Medication 100 MG: at 08:36

## 2025-08-17 RX ADMIN — LEVOTHYROXINE SODIUM 100 MCG: 0.1 TABLET ORAL at 05:11

## 2025-08-17 RX ADMIN — Medication 2000 UNITS: at 08:37

## 2025-08-17 RX ADMIN — SODIUM CHLORIDE, PRESERVATIVE FREE 10 ML: 5 INJECTION INTRAVENOUS at 08:37

## 2025-08-17 RX ADMIN — PETROLATUM: 420 OINTMENT TOPICAL at 08:37

## 2025-08-17 RX ADMIN — MIRTAZAPINE 30 MG: 15 TABLET, FILM COATED ORAL at 19:45

## 2025-08-17 RX ADMIN — PIPERACILLIN AND TAZOBACTAM 3375 MG: 3; .375 INJECTION, POWDER, LYOPHILIZED, FOR SOLUTION INTRAVENOUS at 13:45

## 2025-08-17 RX ADMIN — SODIUM CHLORIDE, PRESERVATIVE FREE 10 ML: 5 INJECTION INTRAVENOUS at 19:42

## 2025-08-17 RX ADMIN — ALLOPURINOL 100 MG: 100 TABLET ORAL at 08:36

## 2025-08-17 RX ADMIN — SODIUM BICARBONATE: 84 INJECTION, SOLUTION INTRAVENOUS at 09:44

## 2025-08-17 ASSESSMENT — PAIN SCALES - GENERAL
PAINLEVEL_OUTOF10: 0

## 2025-08-18 ENCOUNTER — APPOINTMENT (OUTPATIENT)
Age: 85
DRG: 871 | End: 2025-08-18
Payer: COMMERCIAL

## 2025-08-18 LAB
ABO/RH: NORMAL
ALBUMIN SERPL-MCNC: 2.3 G/DL (ref 3.5–5.2)
ALP SERPL-CCNC: 54 U/L (ref 40–129)
ALT SERPL-CCNC: 14 U/L (ref 0–50)
ANION GAP SERPL CALCULATED.3IONS-SCNC: 15 MMOL/L (ref 7–16)
ANION GAP SERPL CALCULATED.3IONS-SCNC: 15 MMOL/L (ref 7–16)
ANTIBODY SCREEN: NEGATIVE
ARM BAND NUMBER: NORMAL
ARM BAND NUMBER: NORMAL
AST SERPL-CCNC: 33 U/L (ref 0–50)
BASOPHILS # BLD: 0.01 K/UL (ref 0–0.2)
BASOPHILS NFR BLD: 1 % (ref 0–2)
BILIRUB SERPL-MCNC: 1 MG/DL (ref 0–1.2)
BLOOD BANK BLOOD PRODUCT EXPIRATION DATE: NORMAL
BLOOD BANK DISPENSE STATUS: NORMAL
BLOOD BANK ISBT PRODUCT BLOOD TYPE: 2800
BLOOD BANK ISBT PRODUCT BLOOD TYPE: 7300
BLOOD BANK ISBT PRODUCT BLOOD TYPE: 7300
BLOOD BANK PRODUCT CODE: NORMAL
BLOOD BANK SAMPLE EXPIRATION: NORMAL
BLOOD BANK UNIT TYPE AND RH: NORMAL
BPU ID: NORMAL
BUN SERPL-MCNC: 59 MG/DL (ref 8–23)
BUN SERPL-MCNC: 61 MG/DL (ref 8–23)
CALCIUM SERPL-MCNC: 7.5 MG/DL (ref 8.8–10.2)
CALCIUM SERPL-MCNC: 7.5 MG/DL (ref 8.8–10.2)
CHLORIDE SERPL-SCNC: 95 MMOL/L (ref 98–107)
CHLORIDE SERPL-SCNC: 99 MMOL/L (ref 98–107)
CO2 SERPL-SCNC: 20 MMOL/L (ref 22–29)
CO2 SERPL-SCNC: 22 MMOL/L (ref 22–29)
COMPONENT: NORMAL
CREAT SERPL-MCNC: 2.7 MG/DL (ref 0.7–1.2)
CREAT SERPL-MCNC: 2.8 MG/DL (ref 0.7–1.2)
CROSSMATCH RESULT: NORMAL
CROSSMATCH RESULT: NORMAL
EOSINOPHIL # BLD: 0 K/UL (ref 0.05–0.5)
EOSINOPHILS RELATIVE PERCENT: 0 % (ref 0–6)
ERYTHROCYTE [DISTWIDTH] IN BLOOD BY AUTOMATED COUNT: 14.5 % (ref 11.5–15)
GFR, ESTIMATED: 22 ML/MIN/1.73M2
GFR, ESTIMATED: 23 ML/MIN/1.73M2
GLUCOSE SERPL-MCNC: 135 MG/DL (ref 74–99)
GLUCOSE SERPL-MCNC: 96 MG/DL (ref 74–99)
HCT VFR BLD AUTO: 24.3 % (ref 37–54)
HGB BLD-MCNC: 8.1 G/DL (ref 12.5–16.5)
IMM GRANULOCYTES # BLD AUTO: 0.08 K/UL (ref 0–0.58)
IMM GRANULOCYTES NFR BLD: 9 % (ref 0–5)
LYMPHOCYTES NFR BLD: 0.38 K/UL (ref 1.5–4)
LYMPHOCYTES RELATIVE PERCENT: 41 % (ref 20–42)
MAGNESIUM SERPL-MCNC: 1.6 MG/DL (ref 1.6–2.4)
MCH RBC QN AUTO: 28.4 PG (ref 26–35)
MCHC RBC AUTO-ENTMCNC: 33.3 G/DL (ref 32–34.5)
MCV RBC AUTO: 85.3 FL (ref 80–99.9)
MONOCYTES NFR BLD: 0.05 K/UL (ref 0.1–0.95)
MONOCYTES NFR BLD: 5 % (ref 2–12)
NEUTROPHILS NFR BLD: 44 % (ref 43–80)
NEUTS SEG NFR BLD: 0.4 K/UL (ref 1.8–7.3)
PLATELET CONFIRMATION: NORMAL
PLATELET, FLUORESCENCE: 20 K/UL (ref 130–450)
PMV BLD AUTO: ABNORMAL FL (ref 7–12)
POTASSIUM SERPL-SCNC: 3.4 MMOL/L (ref 3.5–5.1)
POTASSIUM SERPL-SCNC: 4.3 MMOL/L (ref 3.5–5.1)
PROT SERPL-MCNC: 5.1 G/DL (ref 6.4–8.3)
RBC # BLD AUTO: 2.85 M/UL (ref 3.8–5.8)
SODIUM SERPL-SCNC: 131 MMOL/L (ref 136–145)
SODIUM SERPL-SCNC: 135 MMOL/L (ref 136–145)
TRANSFUSION STATUS: NORMAL
UNIT DIVISION: 0
UNIT ISSUE DATE/TIME: NORMAL
WBC OTHER # BLD: 0.9 K/UL (ref 4.5–11.5)

## 2025-08-18 PROCEDURE — 80048 BASIC METABOLIC PNL TOTAL CA: CPT

## 2025-08-18 PROCEDURE — 80053 COMPREHEN METABOLIC PANEL: CPT

## 2025-08-18 PROCEDURE — 2580000003 HC RX 258

## 2025-08-18 PROCEDURE — 6370000000 HC RX 637 (ALT 250 FOR IP): Performed by: INTERNAL MEDICINE

## 2025-08-18 PROCEDURE — 2700000000 HC OXYGEN THERAPY PER DAY

## 2025-08-18 PROCEDURE — 6360000002 HC RX W HCPCS

## 2025-08-18 PROCEDURE — 2500000003 HC RX 250 WO HCPCS: Performed by: INTERNAL MEDICINE

## 2025-08-18 PROCEDURE — 99233 SBSQ HOSP IP/OBS HIGH 50: CPT | Performed by: INTERNAL MEDICINE

## 2025-08-18 PROCEDURE — 83735 ASSAY OF MAGNESIUM: CPT

## 2025-08-18 PROCEDURE — 6370000000 HC RX 637 (ALT 250 FOR IP)

## 2025-08-18 PROCEDURE — 2060000000 HC ICU INTERMEDIATE R&B

## 2025-08-18 PROCEDURE — 85025 COMPLETE CBC W/AUTO DIFF WBC: CPT

## 2025-08-18 PROCEDURE — 36415 COLL VENOUS BLD VENIPUNCTURE: CPT

## 2025-08-18 PROCEDURE — 92610 EVALUATE SWALLOWING FUNCTION: CPT

## 2025-08-18 RX ORDER — POTASSIUM CHLORIDE 1500 MG/1
40 TABLET, EXTENDED RELEASE ORAL ONCE
Status: COMPLETED | OUTPATIENT
Start: 2025-08-18 | End: 2025-08-18

## 2025-08-18 RX ORDER — SODIUM CHLORIDE, SODIUM LACTATE, POTASSIUM CHLORIDE, CALCIUM CHLORIDE 600; 310; 30; 20 MG/100ML; MG/100ML; MG/100ML; MG/100ML
INJECTION, SOLUTION INTRAVENOUS CONTINUOUS
Status: DISCONTINUED | OUTPATIENT
Start: 2025-08-18 | End: 2025-08-22

## 2025-08-18 RX ADMIN — SODIUM CHLORIDE, PRESERVATIVE FREE 10 ML: 5 INJECTION INTRAVENOUS at 09:04

## 2025-08-18 RX ADMIN — MIRTAZAPINE 30 MG: 15 TABLET, FILM COATED ORAL at 20:48

## 2025-08-18 RX ADMIN — PETROLATUM: 420 OINTMENT TOPICAL at 09:04

## 2025-08-18 RX ADMIN — SODIUM CHLORIDE, PRESERVATIVE FREE 10 ML: 5 INJECTION INTRAVENOUS at 20:49

## 2025-08-18 RX ADMIN — Medication 2000 UNITS: at 09:03

## 2025-08-18 RX ADMIN — METOPROLOL SUCCINATE 25 MG: 25 TABLET, EXTENDED RELEASE ORAL at 09:04

## 2025-08-18 RX ADMIN — METOPROLOL SUCCINATE 25 MG: 25 TABLET, EXTENDED RELEASE ORAL at 20:48

## 2025-08-18 RX ADMIN — PIPERACILLIN AND TAZOBACTAM 3375 MG: 3; .375 INJECTION, POWDER, LYOPHILIZED, FOR SOLUTION INTRAVENOUS at 12:12

## 2025-08-18 RX ADMIN — LEVOTHYROXINE SODIUM 100 MCG: 0.1 TABLET ORAL at 05:48

## 2025-08-18 RX ADMIN — PIPERACILLIN AND TAZOBACTAM 3375 MG: 3; .375 INJECTION, POWDER, LYOPHILIZED, FOR SOLUTION INTRAVENOUS at 01:49

## 2025-08-18 RX ADMIN — SODIUM CHLORIDE, SODIUM LACTATE, POTASSIUM CHLORIDE, AND CALCIUM CHLORIDE: .6; .31; .03; .02 INJECTION, SOLUTION INTRAVENOUS at 18:52

## 2025-08-18 RX ADMIN — LOPERAMIDE HYDROCHLORIDE 2 MG: 2 CAPSULE ORAL at 15:40

## 2025-08-18 RX ADMIN — Medication 100 MG: at 09:03

## 2025-08-18 RX ADMIN — PETROLATUM: 420 OINTMENT TOPICAL at 15:07

## 2025-08-18 RX ADMIN — ALLOPURINOL 100 MG: 100 TABLET ORAL at 09:04

## 2025-08-18 RX ADMIN — POTASSIUM CHLORIDE 40 MEQ: 1500 TABLET, EXTENDED RELEASE ORAL at 18:53

## 2025-08-18 ASSESSMENT — PAIN SCALES - GENERAL
PAINLEVEL_OUTOF10: 0
PAINLEVEL_OUTOF10: 0

## 2025-08-19 ENCOUNTER — APPOINTMENT (OUTPATIENT)
Age: 85
DRG: 871 | End: 2025-08-19
Payer: COMMERCIAL

## 2025-08-19 LAB
ANION GAP SERPL CALCULATED.3IONS-SCNC: 15 MMOL/L (ref 7–16)
BASOPHILS # BLD: 0 K/UL (ref 0–0.2)
BASOPHILS NFR BLD: 0 % (ref 0–2)
BUN SERPL-MCNC: 53 MG/DL (ref 8–23)
CALCIUM SERPL-MCNC: 7.5 MG/DL (ref 8.8–10.2)
CHLORIDE SERPL-SCNC: 101 MMOL/L (ref 98–107)
CO2 SERPL-SCNC: 20 MMOL/L (ref 22–29)
CREAT SERPL-MCNC: 2.5 MG/DL (ref 0.7–1.2)
EKG ATRIAL RATE: 111 BPM
EKG ATRIAL RATE: 153 BPM
EKG P AXIS: 49 DEGREES
EKG P AXIS: 55 DEGREES
EKG P-R INTERVAL: 152 MS
EKG P-R INTERVAL: 168 MS
EKG Q-T INTERVAL: 258 MS
EKG Q-T INTERVAL: 286 MS
EKG QRS DURATION: 90 MS
EKG QRS DURATION: 98 MS
EKG QTC CALCULATION (BAZETT): 388 MS
EKG QTC CALCULATION (BAZETT): 390 MS
EKG R AXIS: -26 DEGREES
EKG R AXIS: -30 DEGREES
EKG T AXIS: 105 DEGREES
EKG T AXIS: 142 DEGREES
EKG VENTRICULAR RATE: 111 BPM
EKG VENTRICULAR RATE: 138 BPM
EOSINOPHIL # BLD: 0.01 K/UL (ref 0.05–0.5)
EOSINOPHILS RELATIVE PERCENT: 1 % (ref 0–6)
ERYTHROCYTE [DISTWIDTH] IN BLOOD BY AUTOMATED COUNT: 14.9 % (ref 11.5–15)
GFR, ESTIMATED: 25 ML/MIN/1.73M2
GLUCOSE SERPL-MCNC: 101 MG/DL (ref 74–99)
HCT VFR BLD AUTO: 22.9 % (ref 37–54)
HGB BLD-MCNC: 8 G/DL (ref 12.5–16.5)
LYMPHOCYTES NFR BLD: 0.57 K/UL (ref 1.5–4)
LYMPHOCYTES RELATIVE PERCENT: 52 % (ref 20–42)
MAGNESIUM SERPL-MCNC: 1.4 MG/DL (ref 1.6–2.4)
MCH RBC QN AUTO: 29.2 PG (ref 26–35)
MCHC RBC AUTO-ENTMCNC: 34.9 G/DL (ref 32–34.5)
MCV RBC AUTO: 83.6 FL (ref 80–99.9)
MICROORGANISM SPEC CULT: NORMAL
MONOCYTES NFR BLD: 0.02 K/UL (ref 0.1–0.95)
MONOCYTES NFR BLD: 2 % (ref 2–12)
NEUTROPHILS NFR BLD: 46 % (ref 43–80)
NEUTS SEG NFR BLD: 0.5 K/UL (ref 1.8–7.3)
PLATELET CONFIRMATION: NORMAL
PLATELET, FLUORESCENCE: 19 K/UL (ref 130–450)
PMV BLD AUTO: ABNORMAL FL (ref 7–12)
POTASSIUM SERPL-SCNC: 3.5 MMOL/L (ref 3.5–5.1)
RBC # BLD AUTO: 2.74 M/UL (ref 3.8–5.8)
RBC # BLD: ABNORMAL 10*6/UL
SERVICE CMNT-IMP: NORMAL
SODIUM SERPL-SCNC: 136 MMOL/L (ref 136–145)
SPECIMEN DESCRIPTION: NORMAL
WBC # BLD: ABNORMAL 10*3/UL
WBC OTHER # BLD: 1.1 K/UL (ref 4.5–11.5)

## 2025-08-19 PROCEDURE — 6360000002 HC RX W HCPCS

## 2025-08-19 PROCEDURE — 2580000003 HC RX 258

## 2025-08-19 PROCEDURE — 2060000000 HC ICU INTERMEDIATE R&B

## 2025-08-19 PROCEDURE — 6370000000 HC RX 637 (ALT 250 FOR IP): Performed by: INTERNAL MEDICINE

## 2025-08-19 PROCEDURE — 36415 COLL VENOUS BLD VENIPUNCTURE: CPT

## 2025-08-19 PROCEDURE — 93010 ELECTROCARDIOGRAM REPORT: CPT | Performed by: INTERNAL MEDICINE

## 2025-08-19 PROCEDURE — 85025 COMPLETE CBC W/AUTO DIFF WBC: CPT

## 2025-08-19 PROCEDURE — 2700000000 HC OXYGEN THERAPY PER DAY

## 2025-08-19 PROCEDURE — 80048 BASIC METABOLIC PNL TOTAL CA: CPT

## 2025-08-19 PROCEDURE — 2500000003 HC RX 250 WO HCPCS: Performed by: INTERNAL MEDICINE

## 2025-08-19 PROCEDURE — 97535 SELF CARE MNGMENT TRAINING: CPT

## 2025-08-19 PROCEDURE — 83735 ASSAY OF MAGNESIUM: CPT

## 2025-08-19 PROCEDURE — 93306 TTE W/DOPPLER COMPLETE: CPT

## 2025-08-19 PROCEDURE — 6370000000 HC RX 637 (ALT 250 FOR IP)

## 2025-08-19 PROCEDURE — 99233 SBSQ HOSP IP/OBS HIGH 50: CPT | Performed by: INTERNAL MEDICINE

## 2025-08-19 PROCEDURE — 97530 THERAPEUTIC ACTIVITIES: CPT

## 2025-08-19 RX ORDER — MAGNESIUM SULFATE IN WATER 40 MG/ML
2000 INJECTION, SOLUTION INTRAVENOUS ONCE
Status: COMPLETED | OUTPATIENT
Start: 2025-08-19 | End: 2025-08-19

## 2025-08-19 RX ORDER — POTASSIUM CHLORIDE 1500 MG/1
40 TABLET, EXTENDED RELEASE ORAL ONCE
Status: COMPLETED | OUTPATIENT
Start: 2025-08-19 | End: 2025-08-19

## 2025-08-19 RX ADMIN — Medication 100 MG: at 07:51

## 2025-08-19 RX ADMIN — MAGNESIUM SULFATE HEPTAHYDRATE 2000 MG: 40 INJECTION, SOLUTION INTRAVENOUS at 10:19

## 2025-08-19 RX ADMIN — PETROLATUM: 420 OINTMENT TOPICAL at 07:51

## 2025-08-19 RX ADMIN — SODIUM CHLORIDE, PRESERVATIVE FREE 10 ML: 5 INJECTION INTRAVENOUS at 20:29

## 2025-08-19 RX ADMIN — POTASSIUM CHLORIDE 40 MEQ: 1500 TABLET, EXTENDED RELEASE ORAL at 10:17

## 2025-08-19 RX ADMIN — PETROLATUM: 420 OINTMENT TOPICAL at 14:26

## 2025-08-19 RX ADMIN — MIRTAZAPINE 30 MG: 15 TABLET, FILM COATED ORAL at 20:26

## 2025-08-19 RX ADMIN — ALLOPURINOL 100 MG: 100 TABLET ORAL at 07:51

## 2025-08-19 RX ADMIN — LOPERAMIDE HYDROCHLORIDE 2 MG: 2 CAPSULE ORAL at 17:40

## 2025-08-19 RX ADMIN — Medication 2000 UNITS: at 07:51

## 2025-08-19 RX ADMIN — SODIUM CHLORIDE, PRESERVATIVE FREE 10 ML: 5 INJECTION INTRAVENOUS at 07:51

## 2025-08-19 RX ADMIN — LEVOTHYROXINE SODIUM 100 MCG: 0.1 TABLET ORAL at 06:24

## 2025-08-19 RX ADMIN — PIPERACILLIN AND TAZOBACTAM 3375 MG: 3; .375 INJECTION, POWDER, LYOPHILIZED, FOR SOLUTION INTRAVENOUS at 02:11

## 2025-08-19 RX ADMIN — METOPROLOL SUCCINATE 25 MG: 25 TABLET, EXTENDED RELEASE ORAL at 20:27

## 2025-08-19 RX ADMIN — PIPERACILLIN AND TAZOBACTAM 3375 MG: 3; .375 INJECTION, POWDER, LYOPHILIZED, FOR SOLUTION INTRAVENOUS at 14:26

## 2025-08-19 RX ADMIN — METOPROLOL SUCCINATE 25 MG: 25 TABLET, EXTENDED RELEASE ORAL at 07:51

## 2025-08-19 ASSESSMENT — PAIN SCALES - GENERAL
PAINLEVEL_OUTOF10: 0
PAINLEVEL_OUTOF10: 0

## 2025-08-20 LAB
ANION GAP SERPL CALCULATED.3IONS-SCNC: 13 MMOL/L (ref 7–16)
BASOPHILS # BLD: 0 K/UL (ref 0–0.2)
BASOPHILS NFR BLD: 0 % (ref 0–2)
BUN SERPL-MCNC: 43 MG/DL (ref 8–23)
CALCIUM SERPL-MCNC: 7.8 MG/DL (ref 8.8–10.2)
CHLORIDE SERPL-SCNC: 105 MMOL/L (ref 98–107)
CO2 SERPL-SCNC: 21 MMOL/L (ref 22–29)
CREAT SERPL-MCNC: 2.1 MG/DL (ref 0.7–1.2)
ECHO AO ANNULUS INDEX: 1.91 CM/M2
ECHO AO ASC DIAM: 3.8 CM
ECHO AO ASCENDING AORTA INDEX: 2.35 CM/M2
ECHO AV ANNULUS DIAM: 3.1 CM
ECHO AV AREA PEAK VELOCITY: 2.2 CM2
ECHO AV AREA VTI: 2.5 CM2
ECHO AV AREA/BSA PEAK VELOCITY: 1.4 CM2/M2
ECHO AV AREA/BSA VTI: 1.5 CM2/M2
ECHO AV CUSP MM: 1.8 CM
ECHO AV MEAN GRADIENT: 5 MMHG
ECHO AV MEAN VELOCITY: 1.1 M/S
ECHO AV PEAK GRADIENT: 9 MMHG
ECHO AV PEAK VELOCITY: 1.5 M/S
ECHO AV VELOCITY RATIO: 0.67
ECHO AV VTI: 23.6 CM
ECHO BSA: 1.64 M2
ECHO EST RA PRESSURE: 8 MMHG
ECHO LA DIAMETER INDEX: 2.72 CM/M2
ECHO LA DIAMETER: 4.4 CM
ECHO LA VOL A-L A2C: 79 ML (ref 18–58)
ECHO LA VOL A-L A4C: 65 ML (ref 18–58)
ECHO LA VOL MOD A2C: 75 ML (ref 18–58)
ECHO LA VOL MOD A4C: 60 ML (ref 18–58)
ECHO LA VOLUME AREA LENGTH: 73 ML
ECHO LA VOLUME INDEX A-L A2C: 49 ML/M2 (ref 16–34)
ECHO LA VOLUME INDEX A-L A4C: 40 ML/M2 (ref 16–34)
ECHO LA VOLUME INDEX AREA LENGTH: 45 ML/M2 (ref 16–34)
ECHO LA VOLUME INDEX MOD A2C: 46 ML/M2 (ref 16–34)
ECHO LA VOLUME INDEX MOD A4C: 37 ML/M2 (ref 16–34)
ECHO LV E' LATERAL VELOCITY: 6 CM/S
ECHO LV E' SEPTAL VELOCITY: 3 CM/S
ECHO LV EDV A2C: 98 ML
ECHO LV EDV A4C: 86 ML
ECHO LV EDV BP: 94 ML (ref 67–155)
ECHO LV EDV INDEX A4C: 53 ML/M2
ECHO LV EDV INDEX BP: 58 ML/M2
ECHO LV EDV NDEX A2C: 60 ML/M2
ECHO LV EJECTION FRACTION 3D: 48 %
ECHO LV EJECTION FRACTION A2C: 44 %
ECHO LV EJECTION FRACTION A4C: 51 %
ECHO LV EJECTION FRACTION BIPLANE: 48 % (ref 55–100)
ECHO LV ESV A2C: 55 ML
ECHO LV ESV A4C: 42 ML
ECHO LV ESV BP: 48 ML (ref 22–58)
ECHO LV ESV INDEX A2C: 34 ML/M2
ECHO LV ESV INDEX A4C: 26 ML/M2
ECHO LV ESV INDEX BP: 30 ML/M2
ECHO LV FRACTIONAL SHORTENING: 24 % (ref 28–44)
ECHO LV INTERNAL DIMENSION DIASTOLE INDEX: 2.84 CM/M2
ECHO LV INTERNAL DIMENSION DIASTOLIC: 4.6 CM (ref 4.2–5.9)
ECHO LV INTERNAL DIMENSION SYSTOLIC INDEX: 2.16 CM/M2
ECHO LV INTERNAL DIMENSION SYSTOLIC: 3.5 CM
ECHO LV ISOVOLUMETRIC RELAXATION TIME (IVRT): 94.6 MS
ECHO LV IVSD: 1.4 CM (ref 0.6–1)
ECHO LV IVSS: 1.7 CM
ECHO LV MASS 2D: 205 G (ref 88–224)
ECHO LV MASS INDEX 2D: 126.5 G/M2 (ref 49–115)
ECHO LV POSTERIOR WALL DIASTOLIC: 1 CM (ref 0.6–1)
ECHO LV POSTERIOR WALL SYSTOLIC: 1.6 CM
ECHO LV RELATIVE WALL THICKNESS RATIO: 0.43
ECHO LVOT AREA: 3.5 CM2
ECHO LVOT AV VTI INDEX: 0.75
ECHO LVOT DIAM: 2.1 CM
ECHO LVOT MEAN GRADIENT: 2 MMHG
ECHO LVOT PEAK GRADIENT: 4 MMHG
ECHO LVOT PEAK VELOCITY: 1 M/S
ECHO LVOT STROKE VOLUME INDEX: 37.6 ML/M2
ECHO LVOT SV: 60.9 ML
ECHO LVOT VTI: 17.6 CM
ECHO MV "A" WAVE DURATION: 129.2 MSEC
ECHO MV A VELOCITY: 1.11 M/S
ECHO MV AREA PHT: 2.8 CM2
ECHO MV AREA VTI: 2.6 CM2
ECHO MV E DECELERATION TIME (DT): 193 MS
ECHO MV E VELOCITY: 0.65 M/S
ECHO MV E/A RATIO: 0.59
ECHO MV E/E' LATERAL: 10.83
ECHO MV E/E' RATIO (AVERAGED): 16.25
ECHO MV E/E' SEPTAL: 21.67
ECHO MV EROA PISA: 0.2 CM2
ECHO MV LVOT VTI INDEX: 1.32
ECHO MV MAX VELOCITY: 1.2 M/S
ECHO MV MEAN GRADIENT: 2 MMHG
ECHO MV MEAN VELOCITY: 0.6 M/S
ECHO MV PEAK GRADIENT: 5 MMHG
ECHO MV PRESSURE HALF TIME (PHT): 78 MS
ECHO MV REGURGITANT ALIASING (NYQUIST) VELOCITY: 34 CM/S
ECHO MV REGURGITANT RADIUS PISA: 0.7 CM
ECHO MV REGURGITANT VELOCITY PISA: 5 M/S
ECHO MV REGURGITANT VOLUME PISA: 31.64 ML
ECHO MV REGURGITANT VTIA: 151.2 CM
ECHO MV VTI: 23.3 CM
ECHO PULMONARY ARTERY END DIASTOLIC PRESSURE: 6 MMHG
ECHO PV MAX VELOCITY: 1.3 M/S
ECHO PV MEAN GRADIENT: 3 MMHG
ECHO PV MEAN VELOCITY: 0.9 M/S
ECHO PV PEAK GRADIENT: 7 MMHG
ECHO PV REGURGITANT MAX VELOCITY: 1.3 M/S
ECHO PV VTI: 22.1 CM
ECHO PVEIN PEAK D VELOCITY: 0.4 M/S
ECHO PVEIN PEAK S VELOCITY: 0.6 M/S
ECHO PVEIN S/D RATIO: 1.5
ECHO RIGHT VENTRICULAR SYSTOLIC PRESSURE (RVSP): 42 MMHG
ECHO RV BASAL DIMENSION: 5 CM
ECHO RV INTERNAL DIMENSION: 4.3 CM
ECHO RV LONGITUDINAL DIMENSION: 8.6 CM
ECHO RV MID DIMENSION: 4.8 CM
ECHO RV TAPSE: 2.1 CM (ref 1.7–?)
ECHO TV REGURGITANT MAX VELOCITY: 2.93 M/S
ECHO TV REGURGITANT PEAK GRADIENT: 35 MMHG
EOSINOPHIL # BLD: 0.01 K/UL (ref 0.05–0.5)
EOSINOPHILS RELATIVE PERCENT: 1 % (ref 0–6)
ERYTHROCYTE [DISTWIDTH] IN BLOOD BY AUTOMATED COUNT: 15.4 % (ref 11.5–15)
GFR, ESTIMATED: 31 ML/MIN/1.73M2
GLUCOSE SERPL-MCNC: 98 MG/DL (ref 74–99)
HCT VFR BLD AUTO: 24.5 % (ref 37–54)
HGB BLD-MCNC: 7.8 G/DL (ref 12.5–16.5)
LYMPHOCYTES NFR BLD: 0.64 K/UL (ref 1.5–4)
LYMPHOCYTES RELATIVE PERCENT: 58 % (ref 20–42)
MCH RBC QN AUTO: 28.6 PG (ref 26–35)
MCHC RBC AUTO-ENTMCNC: 31.8 G/DL (ref 32–34.5)
MCV RBC AUTO: 89.7 FL (ref 80–99.9)
METAMYELOCYTES ABSOLUTE COUNT: 0.01 K/UL (ref 0–0.12)
METAMYELOCYTES: 1 % (ref 0–1)
MONOCYTES NFR BLD: 0 % (ref 2–12)
MONOCYTES NFR BLD: 0 K/UL (ref 0.1–0.95)
NEUTROPHILS NFR BLD: 40 % (ref 43–80)
NEUTS SEG NFR BLD: 0.44 K/UL (ref 1.8–7.3)
NUCLEATED RED BLOOD CELLS: 6 PER 100 WBC
PLATELET CONFIRMATION: NORMAL
PLATELET, FLUORESCENCE: 21 K/UL (ref 130–450)
PMV BLD AUTO: ABNORMAL FL (ref 7–12)
POTASSIUM SERPL-SCNC: 3.9 MMOL/L (ref 3.5–5.1)
RBC # BLD AUTO: 2.73 M/UL (ref 3.8–5.8)
RBC # BLD: ABNORMAL 10*6/UL
SODIUM SERPL-SCNC: 139 MMOL/L (ref 136–145)
WBC OTHER # BLD: 1.1 K/UL (ref 4.5–11.5)

## 2025-08-20 PROCEDURE — 6370000000 HC RX 637 (ALT 250 FOR IP): Performed by: INTERNAL MEDICINE

## 2025-08-20 PROCEDURE — 2700000000 HC OXYGEN THERAPY PER DAY

## 2025-08-20 PROCEDURE — 93306 TTE W/DOPPLER COMPLETE: CPT | Performed by: INTERNAL MEDICINE

## 2025-08-20 PROCEDURE — 99233 SBSQ HOSP IP/OBS HIGH 50: CPT | Performed by: INTERNAL MEDICINE

## 2025-08-20 PROCEDURE — 6360000002 HC RX W HCPCS

## 2025-08-20 PROCEDURE — 99222 1ST HOSP IP/OBS MODERATE 55: CPT | Performed by: PHYSICIAN ASSISTANT

## 2025-08-20 PROCEDURE — 85025 COMPLETE CBC W/AUTO DIFF WBC: CPT

## 2025-08-20 PROCEDURE — 36415 COLL VENOUS BLD VENIPUNCTURE: CPT

## 2025-08-20 PROCEDURE — 92526 ORAL FUNCTION THERAPY: CPT

## 2025-08-20 PROCEDURE — 2060000000 HC ICU INTERMEDIATE R&B

## 2025-08-20 PROCEDURE — 2500000003 HC RX 250 WO HCPCS: Performed by: INTERNAL MEDICINE

## 2025-08-20 PROCEDURE — 2580000003 HC RX 258

## 2025-08-20 PROCEDURE — 80048 BASIC METABOLIC PNL TOTAL CA: CPT

## 2025-08-20 RX ADMIN — Medication 2000 UNITS: at 09:34

## 2025-08-20 RX ADMIN — METOPROLOL SUCCINATE 25 MG: 25 TABLET, EXTENDED RELEASE ORAL at 21:17

## 2025-08-20 RX ADMIN — METOPROLOL SUCCINATE 25 MG: 25 TABLET, EXTENDED RELEASE ORAL at 09:34

## 2025-08-20 RX ADMIN — PIPERACILLIN AND TAZOBACTAM 3375 MG: 3; .375 INJECTION, POWDER, LYOPHILIZED, FOR SOLUTION INTRAVENOUS at 13:12

## 2025-08-20 RX ADMIN — PETROLATUM: 420 OINTMENT TOPICAL at 11:24

## 2025-08-20 RX ADMIN — LEVOTHYROXINE SODIUM 100 MCG: 0.1 TABLET ORAL at 06:21

## 2025-08-20 RX ADMIN — MIRTAZAPINE 30 MG: 15 TABLET, FILM COATED ORAL at 21:17

## 2025-08-20 RX ADMIN — SODIUM CHLORIDE, PRESERVATIVE FREE 10 ML: 5 INJECTION INTRAVENOUS at 09:34

## 2025-08-20 RX ADMIN — ALLOPURINOL 100 MG: 100 TABLET ORAL at 09:34

## 2025-08-20 RX ADMIN — PETROLATUM: 420 OINTMENT TOPICAL at 13:30

## 2025-08-20 RX ADMIN — Medication 100 MG: at 09:34

## 2025-08-20 RX ADMIN — PIPERACILLIN AND TAZOBACTAM 3375 MG: 3; .375 INJECTION, POWDER, LYOPHILIZED, FOR SOLUTION INTRAVENOUS at 02:34

## 2025-08-20 ASSESSMENT — PAIN SCALES - GENERAL
PAINLEVEL_OUTOF10: 0
PAINLEVEL_OUTOF10: 0

## 2025-08-21 LAB
ANION GAP SERPL CALCULATED.3IONS-SCNC: 12 MMOL/L (ref 7–16)
BASOPHILS # BLD: 0 K/UL (ref 0–0.2)
BASOPHILS NFR BLD: 0 % (ref 0–2)
BUN SERPL-MCNC: 34 MG/DL (ref 8–23)
CALCIUM SERPL-MCNC: 7.8 MG/DL (ref 8.8–10.2)
CHLORIDE SERPL-SCNC: 105 MMOL/L (ref 98–107)
CO2 SERPL-SCNC: 21 MMOL/L (ref 22–29)
CREAT SERPL-MCNC: 1.7 MG/DL (ref 0.7–1.2)
EOSINOPHIL # BLD: 0.01 K/UL (ref 0.05–0.5)
EOSINOPHILS RELATIVE PERCENT: 1 % (ref 0–6)
ERYTHROCYTE [DISTWIDTH] IN BLOOD BY AUTOMATED COUNT: 15.2 % (ref 11.5–15)
GFR, ESTIMATED: 38 ML/MIN/1.73M2
GLUCOSE SERPL-MCNC: 95 MG/DL (ref 74–99)
HCT VFR BLD AUTO: 21.5 % (ref 37–54)
HGB BLD-MCNC: 7.3 G/DL (ref 12.5–16.5)
LYMPHOCYTES NFR BLD: 0.54 K/UL (ref 1.5–4)
LYMPHOCYTES RELATIVE PERCENT: 54 % (ref 20–42)
MCH RBC QN AUTO: 29.2 PG (ref 26–35)
MCHC RBC AUTO-ENTMCNC: 34 G/DL (ref 32–34.5)
MCV RBC AUTO: 86 FL (ref 80–99.9)
MONOCYTES NFR BLD: 0.03 K/UL (ref 0.1–0.95)
MONOCYTES NFR BLD: 3 % (ref 2–12)
NEUTROPHILS NFR BLD: 43 % (ref 43–80)
NEUTS SEG NFR BLD: 0.43 K/UL (ref 1.8–7.3)
PLATELET CONFIRMATION: NORMAL
PLATELET, FLUORESCENCE: 23 K/UL (ref 130–450)
PMV BLD AUTO: ABNORMAL FL (ref 7–12)
POTASSIUM SERPL-SCNC: 4.1 MMOL/L (ref 3.5–5.1)
RBC # BLD AUTO: 2.5 M/UL (ref 3.8–5.8)
RBC # BLD: ABNORMAL 10*6/UL
SODIUM SERPL-SCNC: 138 MMOL/L (ref 136–145)
WBC OTHER # BLD: 1 K/UL (ref 4.5–11.5)

## 2025-08-21 PROCEDURE — 2700000000 HC OXYGEN THERAPY PER DAY

## 2025-08-21 PROCEDURE — 2580000003 HC RX 258

## 2025-08-21 PROCEDURE — 6370000000 HC RX 637 (ALT 250 FOR IP): Performed by: INTERNAL MEDICINE

## 2025-08-21 PROCEDURE — 99233 SBSQ HOSP IP/OBS HIGH 50: CPT | Performed by: INTERNAL MEDICINE

## 2025-08-21 PROCEDURE — 2500000003 HC RX 250 WO HCPCS: Performed by: INTERNAL MEDICINE

## 2025-08-21 PROCEDURE — 36415 COLL VENOUS BLD VENIPUNCTURE: CPT

## 2025-08-21 PROCEDURE — 2060000000 HC ICU INTERMEDIATE R&B

## 2025-08-21 PROCEDURE — 6360000002 HC RX W HCPCS

## 2025-08-21 PROCEDURE — 85025 COMPLETE CBC W/AUTO DIFF WBC: CPT

## 2025-08-21 PROCEDURE — 80048 BASIC METABOLIC PNL TOTAL CA: CPT

## 2025-08-21 PROCEDURE — 97530 THERAPEUTIC ACTIVITIES: CPT

## 2025-08-21 PROCEDURE — 97535 SELF CARE MNGMENT TRAINING: CPT

## 2025-08-21 RX ORDER — OXYMETAZOLINE HYDROCHLORIDE 0.05 G/100ML
2 SPRAY NASAL 2 TIMES DAILY
Status: DISPENSED | OUTPATIENT
Start: 2025-08-21 | End: 2025-08-24

## 2025-08-21 RX ORDER — LEVOTHYROXINE SODIUM 112 UG/1
112 TABLET ORAL DAILY
Status: DISCONTINUED | OUTPATIENT
Start: 2025-08-22 | End: 2025-08-26 | Stop reason: HOSPADM

## 2025-08-21 RX ADMIN — ALLOPURINOL 100 MG: 100 TABLET ORAL at 09:34

## 2025-08-21 RX ADMIN — PETROLATUM: 420 OINTMENT TOPICAL at 09:34

## 2025-08-21 RX ADMIN — PIPERACILLIN AND TAZOBACTAM 3375 MG: 3; .375 INJECTION, POWDER, LYOPHILIZED, FOR SOLUTION INTRAVENOUS at 02:04

## 2025-08-21 RX ADMIN — Medication 100 MG: at 09:34

## 2025-08-21 RX ADMIN — PETROLATUM: 420 OINTMENT TOPICAL at 13:59

## 2025-08-21 RX ADMIN — MIRTAZAPINE 30 MG: 15 TABLET, FILM COATED ORAL at 22:39

## 2025-08-21 RX ADMIN — Medication 2000 UNITS: at 09:34

## 2025-08-21 RX ADMIN — METOPROLOL SUCCINATE 25 MG: 25 TABLET, EXTENDED RELEASE ORAL at 09:34

## 2025-08-21 RX ADMIN — LEVOTHYROXINE SODIUM 100 MCG: 0.1 TABLET ORAL at 05:52

## 2025-08-21 RX ADMIN — PIPERACILLIN AND TAZOBACTAM 3375 MG: 3; .375 INJECTION, POWDER, LYOPHILIZED, FOR SOLUTION INTRAVENOUS at 13:59

## 2025-08-21 RX ADMIN — SODIUM CHLORIDE, PRESERVATIVE FREE 10 ML: 5 INJECTION INTRAVENOUS at 22:41

## 2025-08-21 RX ADMIN — OXYMETAZOLINE HYDROCHLORIDE 2 SPRAY: 0.5 SPRAY NASAL at 09:34

## 2025-08-21 RX ADMIN — SODIUM CHLORIDE, PRESERVATIVE FREE 10 ML: 5 INJECTION INTRAVENOUS at 09:35

## 2025-08-21 RX ADMIN — METOPROLOL SUCCINATE 25 MG: 25 TABLET, EXTENDED RELEASE ORAL at 22:39

## 2025-08-21 RX ADMIN — OXYMETAZOLINE HYDROCHLORIDE 2 SPRAY: 0.5 SPRAY NASAL at 22:30

## 2025-08-21 ASSESSMENT — PAIN SCALES - GENERAL
PAINLEVEL_OUTOF10: 0
PAINLEVEL_OUTOF10: 0

## 2025-08-22 LAB
ANION GAP SERPL CALCULATED.3IONS-SCNC: 11 MMOL/L (ref 7–16)
ATYPICAL LYMPHOCYTE ABSOLUTE COUNT: 0.01 K/UL (ref 0–0.46)
ATYPICAL LYMPHOCYTES: 1 % (ref 0–4)
BASOPHILS # BLD: 0 K/UL (ref 0–0.2)
BASOPHILS NFR BLD: 0 % (ref 0–2)
BUN SERPL-MCNC: 31 MG/DL (ref 8–23)
CALCIUM SERPL-MCNC: 7.6 MG/DL (ref 8.8–10.2)
CHLORIDE SERPL-SCNC: 107 MMOL/L (ref 98–107)
CO2 SERPL-SCNC: 21 MMOL/L (ref 22–29)
CREAT SERPL-MCNC: 1.7 MG/DL (ref 0.7–1.2)
EOSINOPHIL # BLD: 0 K/UL (ref 0.05–0.5)
EOSINOPHILS RELATIVE PERCENT: 0 % (ref 0–6)
ERYTHROCYTE [DISTWIDTH] IN BLOOD BY AUTOMATED COUNT: 15.3 % (ref 11.5–15)
GFR, ESTIMATED: 40 ML/MIN/1.73M2
GLUCOSE SERPL-MCNC: 92 MG/DL (ref 74–99)
HCT VFR BLD AUTO: 20.6 % (ref 37–54)
HGB BLD-MCNC: 7 G/DL (ref 12.5–16.5)
LYMPHOCYTES NFR BLD: 0.44 K/UL (ref 1.5–4)
LYMPHOCYTES RELATIVE PERCENT: 44 % (ref 20–42)
MCH RBC QN AUTO: 29 PG (ref 26–35)
MCHC RBC AUTO-ENTMCNC: 34 G/DL (ref 32–34.5)
MCV RBC AUTO: 85.5 FL (ref 80–99.9)
METAMYELOCYTES ABSOLUTE COUNT: 0.02 K/UL (ref 0–0.12)
METAMYELOCYTES: 2 % (ref 0–1)
MONOCYTES NFR BLD: 0 % (ref 2–12)
MONOCYTES NFR BLD: 0 K/UL (ref 0.1–0.95)
MYELOCYTES ABSOLUTE COUNT: 0.02 K/UL
MYELOCYTES: 2 %
NEUTROPHILS NFR BLD: 51 % (ref 43–80)
NEUTS SEG NFR BLD: 0.5 K/UL (ref 1.8–7.3)
PLATELET # BLD AUTO: 21 K/UL (ref 130–450)
PLATELET CONFIRMATION: NORMAL
PMV BLD AUTO: 10.5 FL (ref 7–12)
POTASSIUM SERPL-SCNC: 3.7 MMOL/L (ref 3.5–5.1)
PROMYELOCYTES ABSOLUTE COUNT: 0.01 K/UL
PROMYELOCYTES: 1 %
RBC # BLD AUTO: 2.41 M/UL (ref 3.8–5.8)
RBC # BLD: ABNORMAL 10*6/UL
SODIUM SERPL-SCNC: 139 MMOL/L (ref 136–145)
WBC OTHER # BLD: 1 K/UL (ref 4.5–11.5)

## 2025-08-22 PROCEDURE — 80048 BASIC METABOLIC PNL TOTAL CA: CPT

## 2025-08-22 PROCEDURE — 86850 RBC ANTIBODY SCREEN: CPT

## 2025-08-22 PROCEDURE — 6370000000 HC RX 637 (ALT 250 FOR IP): Performed by: INTERNAL MEDICINE

## 2025-08-22 PROCEDURE — 6360000002 HC RX W HCPCS: Performed by: LICENSED PRACTICAL NURSE

## 2025-08-22 PROCEDURE — 36430 TRANSFUSION BLD/BLD COMPNT: CPT

## 2025-08-22 PROCEDURE — 2060000000 HC ICU INTERMEDIATE R&B

## 2025-08-22 PROCEDURE — P9016 RBC LEUKOCYTES REDUCED: HCPCS

## 2025-08-22 PROCEDURE — 99233 SBSQ HOSP IP/OBS HIGH 50: CPT | Performed by: INTERNAL MEDICINE

## 2025-08-22 PROCEDURE — 2500000003 HC RX 250 WO HCPCS: Performed by: INTERNAL MEDICINE

## 2025-08-22 PROCEDURE — 86900 BLOOD TYPING SEROLOGIC ABO: CPT

## 2025-08-22 PROCEDURE — 86901 BLOOD TYPING SEROLOGIC RH(D): CPT

## 2025-08-22 PROCEDURE — 2580000003 HC RX 258: Performed by: LICENSED PRACTICAL NURSE

## 2025-08-22 PROCEDURE — 85025 COMPLETE CBC W/AUTO DIFF WBC: CPT

## 2025-08-22 PROCEDURE — 86923 COMPATIBILITY TEST ELECTRIC: CPT

## 2025-08-22 RX ORDER — SODIUM CHLORIDE 9 MG/ML
INJECTION, SOLUTION INTRAVENOUS PRN
Status: DISCONTINUED | OUTPATIENT
Start: 2025-08-22 | End: 2025-08-26 | Stop reason: HOSPADM

## 2025-08-22 RX ADMIN — SODIUM CHLORIDE, PRESERVATIVE FREE 10 ML: 5 INJECTION INTRAVENOUS at 08:37

## 2025-08-22 RX ADMIN — OXYMETAZOLINE HYDROCHLORIDE 2 SPRAY: 0.5 SPRAY NASAL at 08:37

## 2025-08-22 RX ADMIN — OXYMETAZOLINE HYDROCHLORIDE 2 SPRAY: 0.5 SPRAY NASAL at 21:22

## 2025-08-22 RX ADMIN — ALLOPURINOL 100 MG: 100 TABLET ORAL at 08:36

## 2025-08-22 RX ADMIN — LEVOTHYROXINE SODIUM 112 MCG: 0.11 TABLET ORAL at 05:31

## 2025-08-22 RX ADMIN — PETROLATUM: 420 OINTMENT TOPICAL at 08:36

## 2025-08-22 RX ADMIN — Medication 2000 UNITS: at 08:36

## 2025-08-22 RX ADMIN — Medication 100 MG: at 08:36

## 2025-08-22 RX ADMIN — PETROLATUM: 420 OINTMENT TOPICAL at 14:44

## 2025-08-22 RX ADMIN — METOPROLOL SUCCINATE 25 MG: 25 TABLET, EXTENDED RELEASE ORAL at 08:36

## 2025-08-22 RX ADMIN — ACETAMINOPHEN 650 MG: 325 TABLET ORAL at 01:24

## 2025-08-22 RX ADMIN — MIRTAZAPINE 30 MG: 15 TABLET, FILM COATED ORAL at 21:22

## 2025-08-22 RX ADMIN — LOPERAMIDE HYDROCHLORIDE 2 MG: 2 CAPSULE ORAL at 14:57

## 2025-08-22 RX ADMIN — SODIUM CHLORIDE, PRESERVATIVE FREE 10 ML: 5 INJECTION INTRAVENOUS at 21:21

## 2025-08-22 RX ADMIN — PIPERACILLIN AND TAZOBACTAM 3375 MG: 3; .375 INJECTION, POWDER, LYOPHILIZED, FOR SOLUTION INTRAVENOUS at 14:38

## 2025-08-22 RX ADMIN — METOPROLOL SUCCINATE 25 MG: 25 TABLET, EXTENDED RELEASE ORAL at 21:22

## 2025-08-22 RX ADMIN — PIPERACILLIN AND TAZOBACTAM 3375 MG: 3; .375 INJECTION, POWDER, LYOPHILIZED, FOR SOLUTION INTRAVENOUS at 01:17

## 2025-08-22 ASSESSMENT — PAIN SCALES - GENERAL
PAINLEVEL_OUTOF10: 3
PAINLEVEL_OUTOF10: 0

## 2025-08-22 ASSESSMENT — PAIN - FUNCTIONAL ASSESSMENT
PAIN_FUNCTIONAL_ASSESSMENT: 0-10
PAIN_FUNCTIONAL_ASSESSMENT: 0-10

## 2025-08-22 ASSESSMENT — PAIN DESCRIPTION - DESCRIPTORS: DESCRIPTORS: ACHING

## 2025-08-22 ASSESSMENT — PAIN DESCRIPTION - LOCATION: LOCATION: GENERALIZED

## 2025-08-23 LAB
ABO/RH: NORMAL
ANION GAP SERPL CALCULATED.3IONS-SCNC: 11 MMOL/L (ref 7–16)
ANTIBODY SCREEN: NEGATIVE
ARM BAND NUMBER: NORMAL
BLOOD BANK BLOOD PRODUCT EXPIRATION DATE: NORMAL
BLOOD BANK DISPENSE STATUS: NORMAL
BLOOD BANK ISBT PRODUCT BLOOD TYPE: 7300
BLOOD BANK PRODUCT CODE: NORMAL
BLOOD BANK SAMPLE EXPIRATION: NORMAL
BLOOD BANK UNIT TYPE AND RH: NORMAL
BPU ID: NORMAL
BUN SERPL-MCNC: 29 MG/DL (ref 8–23)
CALCIUM SERPL-MCNC: 7.5 MG/DL (ref 8.8–10.2)
CHLORIDE SERPL-SCNC: 107 MMOL/L (ref 98–107)
CO2 SERPL-SCNC: 21 MMOL/L (ref 22–29)
COMPONENT: NORMAL
CREAT SERPL-MCNC: 1.6 MG/DL (ref 0.7–1.2)
CROSSMATCH RESULT: NORMAL
GFR, ESTIMATED: 44 ML/MIN/1.73M2
GLUCOSE SERPL-MCNC: 89 MG/DL (ref 74–99)
HCT VFR BLD AUTO: 24.9 % (ref 37–54)
HGB BLD-MCNC: 8.5 G/DL (ref 12.5–16.5)
POTASSIUM SERPL-SCNC: 3.7 MMOL/L (ref 3.5–5.1)
SODIUM SERPL-SCNC: 139 MMOL/L (ref 136–145)
TRANSFUSION STATUS: NORMAL
UNIT DIVISION: 0
UNIT ISSUE DATE/TIME: NORMAL

## 2025-08-23 PROCEDURE — 2500000003 HC RX 250 WO HCPCS: Performed by: INTERNAL MEDICINE

## 2025-08-23 PROCEDURE — 6370000000 HC RX 637 (ALT 250 FOR IP): Performed by: INTERNAL MEDICINE

## 2025-08-23 PROCEDURE — 85018 HEMOGLOBIN: CPT

## 2025-08-23 PROCEDURE — 6360000002 HC RX W HCPCS: Performed by: INTERNAL MEDICINE

## 2025-08-23 PROCEDURE — 2060000000 HC ICU INTERMEDIATE R&B

## 2025-08-23 PROCEDURE — 80048 BASIC METABOLIC PNL TOTAL CA: CPT

## 2025-08-23 PROCEDURE — 85014 HEMATOCRIT: CPT

## 2025-08-23 RX ORDER — LEVOTHYROXINE SODIUM 112 UG/1
112 TABLET ORAL DAILY
DISCHARGE
Start: 2025-08-24

## 2025-08-23 RX ORDER — MIRTAZAPINE 30 MG/1
30 TABLET, FILM COATED ORAL NIGHTLY
DISCHARGE
Start: 2025-08-23

## 2025-08-23 RX ORDER — ALLOPURINOL 100 MG/1
100 TABLET ORAL DAILY
DISCHARGE
Start: 2025-08-23

## 2025-08-23 RX ORDER — METOPROLOL SUCCINATE 25 MG/1
25 TABLET, EXTENDED RELEASE ORAL 2 TIMES DAILY
DISCHARGE
Start: 2025-08-23

## 2025-08-23 RX ADMIN — MIRTAZAPINE 30 MG: 15 TABLET, FILM COATED ORAL at 21:08

## 2025-08-23 RX ADMIN — ONDANSETRON 4 MG: 2 INJECTION, SOLUTION INTRAMUSCULAR; INTRAVENOUS at 08:07

## 2025-08-23 RX ADMIN — OXYMETAZOLINE HYDROCHLORIDE 2 SPRAY: 0.5 SPRAY NASAL at 08:04

## 2025-08-23 RX ADMIN — LEVOTHYROXINE SODIUM 112 MCG: 0.11 TABLET ORAL at 05:43

## 2025-08-23 RX ADMIN — Medication 2000 UNITS: at 08:04

## 2025-08-23 RX ADMIN — METOPROLOL SUCCINATE 25 MG: 25 TABLET, EXTENDED RELEASE ORAL at 21:08

## 2025-08-23 RX ADMIN — Medication 100 MG: at 08:04

## 2025-08-23 RX ADMIN — SODIUM CHLORIDE, PRESERVATIVE FREE 10 ML: 5 INJECTION INTRAVENOUS at 08:04

## 2025-08-23 RX ADMIN — LOPERAMIDE HYDROCHLORIDE 2 MG: 2 CAPSULE ORAL at 11:28

## 2025-08-23 RX ADMIN — PETROLATUM: 420 OINTMENT TOPICAL at 08:05

## 2025-08-23 RX ADMIN — ALLOPURINOL 100 MG: 100 TABLET ORAL at 08:04

## 2025-08-23 RX ADMIN — METOPROLOL SUCCINATE 25 MG: 25 TABLET, EXTENDED RELEASE ORAL at 08:04

## 2025-08-23 ASSESSMENT — PAIN SCALES - GENERAL: PAINLEVEL_OUTOF10: 0

## 2025-08-24 LAB
ANION GAP SERPL CALCULATED.3IONS-SCNC: 13 MMOL/L (ref 7–16)
BUN SERPL-MCNC: 24 MG/DL (ref 8–23)
CALCIUM SERPL-MCNC: 7.9 MG/DL (ref 8.8–10.2)
CHLORIDE SERPL-SCNC: 107 MMOL/L (ref 98–107)
CO2 SERPL-SCNC: 21 MMOL/L (ref 22–29)
CREAT SERPL-MCNC: 1.5 MG/DL (ref 0.7–1.2)
GFR, ESTIMATED: 46 ML/MIN/1.73M2
GLUCOSE SERPL-MCNC: 92 MG/DL (ref 74–99)
MAGNESIUM SERPL-MCNC: 1.4 MG/DL (ref 1.6–2.4)
PHOSPHATE SERPL-MCNC: 3 MG/DL (ref 2.5–4.5)
POTASSIUM SERPL-SCNC: 3.6 MMOL/L (ref 3.5–5.1)
SODIUM SERPL-SCNC: 141 MMOL/L (ref 136–145)

## 2025-08-24 PROCEDURE — 2060000000 HC ICU INTERMEDIATE R&B

## 2025-08-24 PROCEDURE — 83735 ASSAY OF MAGNESIUM: CPT

## 2025-08-24 PROCEDURE — 80048 BASIC METABOLIC PNL TOTAL CA: CPT

## 2025-08-24 PROCEDURE — 6370000000 HC RX 637 (ALT 250 FOR IP): Performed by: INTERNAL MEDICINE

## 2025-08-24 PROCEDURE — 2500000003 HC RX 250 WO HCPCS: Performed by: INTERNAL MEDICINE

## 2025-08-24 PROCEDURE — 84100 ASSAY OF PHOSPHORUS: CPT

## 2025-08-24 RX ADMIN — METOPROLOL SUCCINATE 25 MG: 25 TABLET, EXTENDED RELEASE ORAL at 19:25

## 2025-08-24 RX ADMIN — ALLOPURINOL 100 MG: 100 TABLET ORAL at 07:59

## 2025-08-24 RX ADMIN — Medication 2000 UNITS: at 07:59

## 2025-08-24 RX ADMIN — Medication 100 MG: at 07:59

## 2025-08-24 RX ADMIN — PETROLATUM: 420 OINTMENT TOPICAL at 07:59

## 2025-08-24 RX ADMIN — SODIUM CHLORIDE, PRESERVATIVE FREE 10 ML: 5 INJECTION INTRAVENOUS at 08:01

## 2025-08-24 RX ADMIN — METOPROLOL SUCCINATE 25 MG: 25 TABLET, EXTENDED RELEASE ORAL at 07:59

## 2025-08-24 RX ADMIN — MIRTAZAPINE 30 MG: 15 TABLET, FILM COATED ORAL at 19:25

## 2025-08-24 RX ADMIN — LEVOTHYROXINE SODIUM 112 MCG: 0.11 TABLET ORAL at 05:29

## 2025-08-24 RX ADMIN — SODIUM CHLORIDE, PRESERVATIVE FREE 10 ML: 5 INJECTION INTRAVENOUS at 19:26

## 2025-08-24 ASSESSMENT — PAIN SCALES - GENERAL: PAINLEVEL_OUTOF10: 0

## 2025-08-25 LAB
ANION GAP SERPL CALCULATED.3IONS-SCNC: 13 MMOL/L (ref 7–16)
BUN SERPL-MCNC: 27 MG/DL (ref 8–23)
CALCIUM SERPL-MCNC: 7.8 MG/DL (ref 8.8–10.2)
CHLORIDE SERPL-SCNC: 106 MMOL/L (ref 98–107)
CO2 SERPL-SCNC: 21 MMOL/L (ref 22–29)
CREAT SERPL-MCNC: 1.5 MG/DL (ref 0.7–1.2)
GFR, ESTIMATED: 47 ML/MIN/1.73M2
GLUCOSE SERPL-MCNC: 91 MG/DL (ref 74–99)
POTASSIUM SERPL-SCNC: 3.7 MMOL/L (ref 3.5–5.1)
SODIUM SERPL-SCNC: 140 MMOL/L (ref 136–145)

## 2025-08-25 PROCEDURE — 80048 BASIC METABOLIC PNL TOTAL CA: CPT

## 2025-08-25 PROCEDURE — 2060000000 HC ICU INTERMEDIATE R&B

## 2025-08-25 PROCEDURE — 2500000003 HC RX 250 WO HCPCS: Performed by: INTERNAL MEDICINE

## 2025-08-25 PROCEDURE — 6370000000 HC RX 637 (ALT 250 FOR IP): Performed by: INTERNAL MEDICINE

## 2025-08-25 RX ORDER — TEMAZEPAM 7.5 MG/1
7.5 CAPSULE ORAL ONCE
Status: DISCONTINUED | OUTPATIENT
Start: 2025-08-25 | End: 2025-08-25 | Stop reason: CLARIF

## 2025-08-25 RX ORDER — TEMAZEPAM 15 MG/1
15 CAPSULE ORAL ONCE
Status: COMPLETED | OUTPATIENT
Start: 2025-08-25 | End: 2025-08-25

## 2025-08-25 RX ADMIN — MIRTAZAPINE 30 MG: 15 TABLET, FILM COATED ORAL at 21:04

## 2025-08-25 RX ADMIN — Medication 2000 UNITS: at 09:51

## 2025-08-25 RX ADMIN — ALLOPURINOL 100 MG: 100 TABLET ORAL at 09:51

## 2025-08-25 RX ADMIN — SODIUM CHLORIDE, PRESERVATIVE FREE 10 ML: 5 INJECTION INTRAVENOUS at 21:04

## 2025-08-25 RX ADMIN — Medication 100 MG: at 09:50

## 2025-08-25 RX ADMIN — LEVOTHYROXINE SODIUM 112 MCG: 0.11 TABLET ORAL at 04:43

## 2025-08-25 RX ADMIN — METOPROLOL SUCCINATE 25 MG: 25 TABLET, EXTENDED RELEASE ORAL at 21:04

## 2025-08-25 RX ADMIN — SODIUM CHLORIDE, PRESERVATIVE FREE 10 ML: 5 INJECTION INTRAVENOUS at 09:51

## 2025-08-25 RX ADMIN — TEMAZEPAM 15 MG: 15 CAPSULE ORAL at 21:04

## 2025-08-25 RX ADMIN — METOPROLOL SUCCINATE 25 MG: 25 TABLET, EXTENDED RELEASE ORAL at 09:50

## 2025-08-25 RX ADMIN — PETROLATUM: 420 OINTMENT TOPICAL at 16:12

## 2025-08-25 ASSESSMENT — PAIN SCALES - GENERAL
PAINLEVEL_OUTOF10: 0
PAINLEVEL_OUTOF10: 0

## 2025-08-26 VITALS
DIASTOLIC BLOOD PRESSURE: 72 MMHG | HEART RATE: 85 BPM | BODY MASS INDEX: 18.56 KG/M2 | WEIGHT: 129.63 LBS | SYSTOLIC BLOOD PRESSURE: 124 MMHG | TEMPERATURE: 98.1 F | HEIGHT: 70 IN | RESPIRATION RATE: 16 BRPM | OXYGEN SATURATION: 94 %

## 2025-08-26 LAB
ANION GAP SERPL CALCULATED.3IONS-SCNC: 11 MMOL/L (ref 7–16)
BUN SERPL-MCNC: 29 MG/DL (ref 8–23)
CALCIUM SERPL-MCNC: 7.7 MG/DL (ref 8.8–10.2)
CHLORIDE SERPL-SCNC: 108 MMOL/L (ref 98–107)
CO2 SERPL-SCNC: 22 MMOL/L (ref 22–29)
CREAT SERPL-MCNC: 1.6 MG/DL (ref 0.7–1.2)
ERYTHROCYTE [DISTWIDTH] IN BLOOD BY AUTOMATED COUNT: 15.1 % (ref 11.5–15)
GFR, ESTIMATED: 43 ML/MIN/1.73M2
GLUCOSE SERPL-MCNC: 92 MG/DL (ref 74–99)
HCT VFR BLD AUTO: 25.4 % (ref 37–54)
HGB BLD-MCNC: 8.8 G/DL (ref 12.5–16.5)
MCH RBC QN AUTO: 28.9 PG (ref 26–35)
MCHC RBC AUTO-ENTMCNC: 34.6 G/DL (ref 32–34.5)
MCV RBC AUTO: 83.6 FL (ref 80–99.9)
PLATELET # BLD AUTO: 34 K/UL (ref 130–450)
PLATELET CONFIRMATION: NORMAL
PMV BLD AUTO: 10.6 FL (ref 7–12)
POTASSIUM SERPL-SCNC: 3.6 MMOL/L (ref 3.5–5.1)
RBC # BLD AUTO: 3.04 M/UL (ref 3.8–5.8)
SODIUM SERPL-SCNC: 141 MMOL/L (ref 136–145)
WBC OTHER # BLD: 2.1 K/UL (ref 4.5–11.5)

## 2025-08-26 PROCEDURE — 6370000000 HC RX 637 (ALT 250 FOR IP): Performed by: INTERNAL MEDICINE

## 2025-08-26 PROCEDURE — 85027 COMPLETE CBC AUTOMATED: CPT

## 2025-08-26 PROCEDURE — 80048 BASIC METABOLIC PNL TOTAL CA: CPT

## 2025-08-26 PROCEDURE — 2500000003 HC RX 250 WO HCPCS: Performed by: INTERNAL MEDICINE

## 2025-08-26 RX ORDER — FUROSEMIDE 20 MG/1
20 TABLET ORAL PRN
DISCHARGE
Start: 2025-08-27

## 2025-08-26 RX ADMIN — Medication 100 MG: at 10:12

## 2025-08-26 RX ADMIN — METOPROLOL SUCCINATE 25 MG: 25 TABLET, EXTENDED RELEASE ORAL at 10:13

## 2025-08-26 RX ADMIN — PETROLATUM: 420 OINTMENT TOPICAL at 10:19

## 2025-08-26 RX ADMIN — SODIUM CHLORIDE, PRESERVATIVE FREE 10 ML: 5 INJECTION INTRAVENOUS at 10:13

## 2025-08-26 RX ADMIN — LEVOTHYROXINE SODIUM 112 MCG: 0.11 TABLET ORAL at 07:52

## 2025-08-26 RX ADMIN — ALLOPURINOL 100 MG: 100 TABLET ORAL at 10:13

## 2025-08-26 RX ADMIN — Medication 2000 UNITS: at 10:12

## 2025-08-26 ASSESSMENT — PAIN SCALES - GENERAL: PAINLEVEL_OUTOF10: 0

## 2025-09-05 ENCOUNTER — HOSPITAL ENCOUNTER (EMERGENCY)
Age: 85
Discharge: HOME | End: 2025-09-06
Attending: EMERGENCY MEDICINE
Payer: COMMERCIAL

## 2025-09-05 DIAGNOSIS — D64.9 ANEMIA, UNSPECIFIED TYPE: Primary | ICD-10-CM

## 2025-09-05 LAB
ALBUMIN SERPL-MCNC: 3.2 G/DL (ref 3.5–5.2)
ALP SERPL-CCNC: 99 U/L (ref 40–129)
ALT SERPL-CCNC: <5 U/L (ref 0–50)
ANION GAP SERPL CALCULATED.3IONS-SCNC: 13 MMOL/L (ref 7–16)
AST SERPL-CCNC: 17 U/L (ref 0–50)
ATYPICAL LYMPHOCYTE ABSOLUTE COUNT: 0.06 K/UL (ref 0–0.46)
ATYPICAL LYMPHOCYTES: 3 % (ref 0–4)
BASOPHILS # BLD: 0 K/UL (ref 0–0.2)
BASOPHILS NFR BLD: 0 % (ref 0–2)
BILIRUB SERPL-MCNC: 0.5 MG/DL (ref 0–1.2)
BUN SERPL-MCNC: 21 MG/DL (ref 8–23)
CALCIUM SERPL-MCNC: 7.6 MG/DL (ref 8.8–10.2)
CHLORIDE SERPL-SCNC: 106 MMOL/L (ref 98–107)
CO2 SERPL-SCNC: 23 MMOL/L (ref 22–29)
CREAT SERPL-MCNC: 1.6 MG/DL (ref 0.7–1.2)
EKG ATRIAL RATE: 61 BPM
EKG Q-T INTERVAL: 502 MS
EKG QRS DURATION: 94 MS
EKG QTC CALCULATION (BAZETT): 549 MS
EKG R AXIS: -20 DEGREES
EKG T AXIS: 196 DEGREES
EKG VENTRICULAR RATE: 72 BPM
EOSINOPHIL # BLD: 0 K/UL (ref 0.05–0.5)
EOSINOPHILS RELATIVE PERCENT: 0 % (ref 0–6)
ERYTHROCYTE [DISTWIDTH] IN BLOOD BY AUTOMATED COUNT: 15.3 % (ref 11.5–15)
GFR, ESTIMATED: 43 ML/MIN/1.73M2
GLUCOSE SERPL-MCNC: 133 MG/DL (ref 74–99)
HCT VFR BLD AUTO: 18.4 % (ref 37–54)
HGB BLD-MCNC: 6 G/DL (ref 12.5–16.5)
LYMPHOCYTES NFR BLD: 0.9 K/UL (ref 1.5–4)
LYMPHOCYTES RELATIVE PERCENT: 43 % (ref 20–42)
MCH RBC QN AUTO: 28.2 PG (ref 26–35)
MCHC RBC AUTO-ENTMCNC: 32.6 G/DL (ref 32–34.5)
MCV RBC AUTO: 86.4 FL (ref 80–99.9)
METAMYELOCYTES ABSOLUTE COUNT: 0.02 K/UL (ref 0–0.12)
METAMYELOCYTES: 1 % (ref 0–1)
MONOCYTES NFR BLD: 0.02 K/UL (ref 0.1–0.95)
MONOCYTES NFR BLD: 1 % (ref 2–12)
MYELOCYTES ABSOLUTE COUNT: 0.02 K/UL
MYELOCYTES: 1 %
NEUTROPHILS NFR BLD: 52 % (ref 43–80)
NEUTS SEG NFR BLD: 1.09 K/UL (ref 1.8–7.3)
PLATELET # BLD AUTO: 45 K/UL (ref 130–450)
PLATELET CONFIRMATION: NORMAL
PMV BLD AUTO: 12.4 FL (ref 7–12)
POTASSIUM SERPL-SCNC: 3 MMOL/L (ref 3.5–5.1)
PROT SERPL-MCNC: 5.7 G/DL (ref 6.4–8.3)
RBC # BLD AUTO: 2.13 M/UL (ref 3.8–5.8)
RBC # BLD: ABNORMAL 10*6/UL
SODIUM SERPL-SCNC: 142 MMOL/L (ref 136–145)
WBC # BLD: ABNORMAL 10*3/UL
WBC OTHER # BLD: 2.1 K/UL (ref 4.5–11.5)

## 2025-09-05 PROCEDURE — 86900 BLOOD TYPING SEROLOGIC ABO: CPT

## 2025-09-05 PROCEDURE — 80053 COMPREHEN METABOLIC PANEL: CPT

## 2025-09-05 PROCEDURE — 86901 BLOOD TYPING SEROLOGIC RH(D): CPT

## 2025-09-05 PROCEDURE — 86850 RBC ANTIBODY SCREEN: CPT

## 2025-09-05 PROCEDURE — 85025 COMPLETE CBC W/AUTO DIFF WBC: CPT

## 2025-09-05 PROCEDURE — P9016 RBC LEUKOCYTES REDUCED: HCPCS

## 2025-09-05 PROCEDURE — 86923 COMPATIBILITY TEST ELECTRIC: CPT

## 2025-09-05 RX ORDER — SODIUM CHLORIDE 9 MG/ML
INJECTION, SOLUTION INTRAVENOUS PRN
Status: DISCONTINUED | OUTPATIENT
Start: 2025-09-05 | End: 2025-09-06 | Stop reason: HOSPADM

## 2025-09-05 ASSESSMENT — PAIN SCALES - GENERAL: PAINLEVEL_OUTOF10: 0

## 2025-09-05 ASSESSMENT — PAIN - FUNCTIONAL ASSESSMENT: PAIN_FUNCTIONAL_ASSESSMENT: 0-10

## 2025-09-06 VITALS
TEMPERATURE: 98.3 F | HEIGHT: 69 IN | RESPIRATION RATE: 23 BRPM | OXYGEN SATURATION: 100 % | SYSTOLIC BLOOD PRESSURE: 133 MMHG | DIASTOLIC BLOOD PRESSURE: 60 MMHG | WEIGHT: 125 LBS | BODY MASS INDEX: 18.51 KG/M2 | HEART RATE: 75 BPM

## 2025-09-06 LAB
HCT VFR BLD AUTO: 22.3 % (ref 37–54)
HGB BLD-MCNC: 7.5 G/DL (ref 12.5–16.5)

## 2025-09-06 PROCEDURE — P9016 RBC LEUKOCYTES REDUCED: HCPCS

## 2025-09-06 PROCEDURE — 6370000000 HC RX 637 (ALT 250 FOR IP)

## 2025-09-06 PROCEDURE — 85014 HEMATOCRIT: CPT

## 2025-09-06 PROCEDURE — 85018 HEMOGLOBIN: CPT

## 2025-09-06 RX ORDER — LORAZEPAM 1 MG/1
1 TABLET ORAL ONCE
Status: COMPLETED | OUTPATIENT
Start: 2025-09-06 | End: 2025-09-06

## 2025-09-06 RX ORDER — SODIUM CHLORIDE 9 MG/ML
INJECTION, SOLUTION INTRAVENOUS PRN
Status: DISCONTINUED | OUTPATIENT
Start: 2025-09-06 | End: 2025-09-06

## 2025-09-06 RX ADMIN — POTASSIUM BICARBONATE 40 MEQ: 782 TABLET, EFFERVESCENT ORAL at 00:20

## 2025-09-06 RX ADMIN — LORAZEPAM 1 MG: 1 TABLET ORAL at 03:29

## 2025-09-07 LAB
ABO/RH: NORMAL
ANTIBODY SCREEN: NEGATIVE
ARM BAND NUMBER: NORMAL
BLOOD BANK BLOOD PRODUCT EXPIRATION DATE: NORMAL
BLOOD BANK BLOOD PRODUCT EXPIRATION DATE: NORMAL
BLOOD BANK DISPENSE STATUS: NORMAL
BLOOD BANK DISPENSE STATUS: NORMAL
BLOOD BANK ISBT PRODUCT BLOOD TYPE: 7300
BLOOD BANK ISBT PRODUCT BLOOD TYPE: 7300
BLOOD BANK PRODUCT CODE: NORMAL
BLOOD BANK PRODUCT CODE: NORMAL
BLOOD BANK SAMPLE EXPIRATION: NORMAL
BLOOD BANK UNIT TYPE AND RH: NORMAL
BLOOD BANK UNIT TYPE AND RH: NORMAL
BPU ID: NORMAL
BPU ID: NORMAL
COMPONENT: NORMAL
COMPONENT: NORMAL
CROSSMATCH RESULT: NORMAL
CROSSMATCH RESULT: NORMAL
TRANSFUSION STATUS: NORMAL
TRANSFUSION STATUS: NORMAL
UNIT DIVISION: 0
UNIT DIVISION: 0
UNIT ISSUE DATE/TIME: NORMAL
UNIT ISSUE DATE/TIME: NORMAL

## (undated) DEVICE — HF-RESECTION ELECTRODE PLASMALOOP LOOP, MEDIUM, 24 FR., 12°/16°, ESG TURIS: Brand: OLYMPUS

## (undated) DEVICE — TUBING, SUCTION, 3/16" X 12', STRAIGHT: Brand: MEDLINE

## (undated) DEVICE — MEDICINE CUP, GRADUATED, STER: Brand: MEDLINE

## (undated) DEVICE — SYRINGE 20ML LL S/C 50

## (undated) DEVICE — STRAP,CATHETER,ADJBL FOAM,HOOK&LOOP: Brand: MEDLINE

## (undated) DEVICE — DRAINBAG,ANTI-REFLUX TOWER,L/F,2000ML,LL: Brand: MEDLINE

## (undated) DEVICE — 4-PORT MANIFOLD: Brand: NEPTUNE 2

## (undated) DEVICE — GLOVE ORANGE PI 7   MSG9070

## (undated) DEVICE — SYRINGE,TOOMEY,IRRIGATION,70CC,STERILE: Brand: MEDLINE

## (undated) DEVICE — SOLUTION IRRIG 3000ML STRL H2O USP UROMATIC PLAS CONT

## (undated) DEVICE — TUR/ENDOSCOPIC CABLE, 10' (3.05 M): Brand: CONMED

## (undated) DEVICE — CATHETER URET 5FR L70CM OPN END SGL LUMN INJ HUB FLEXIMA

## (undated) DEVICE — GAUZE,SPONGE,4"X4",8PLY,STRL,LF,10/TRAY: Brand: MEDLINE

## (undated) DEVICE — BAG DRNGE COMB PK

## (undated) DEVICE — GOWN,SIRUS,FABRNF,XL,20/CS: Brand: MEDLINE

## (undated) DEVICE — CYSTO PACK: Brand: MEDLINE INDUSTRIES, INC.

## (undated) DEVICE — SOLUTION SCRB 4OZ 4% CHG H2O AIDED FOR PREOPERATIVE SKIN

## (undated) DEVICE — SYRINGE MED 30ML STD CLR PLAS LUERLOCK TIP N CTRL DISP

## (undated) DEVICE — SYRINGE, LUER LOCK, 10ML: Brand: MEDLINE

## (undated) DEVICE — CATHETER F BLLN 5CC 18FR 2 W HYDRGEL COAT LESS TRAUM LUB